# Patient Record
Sex: FEMALE | Race: BLACK OR AFRICAN AMERICAN | NOT HISPANIC OR LATINO | URBAN - METROPOLITAN AREA
[De-identification: names, ages, dates, MRNs, and addresses within clinical notes are randomized per-mention and may not be internally consistent; named-entity substitution may affect disease eponyms.]

---

## 2018-12-21 ENCOUNTER — INPATIENT (INPATIENT)
Facility: HOSPITAL | Age: 83
LOS: 4 days | Discharge: HOME | End: 2018-12-26
Attending: INTERNAL MEDICINE | Admitting: INTERNAL MEDICINE
Payer: MEDICARE

## 2018-12-21 VITALS
OXYGEN SATURATION: 96 % | TEMPERATURE: 97 F | DIASTOLIC BLOOD PRESSURE: 70 MMHG | HEIGHT: 66 IN | HEART RATE: 72 BPM | SYSTOLIC BLOOD PRESSURE: 130 MMHG | WEIGHT: 149.91 LBS | RESPIRATION RATE: 20 BRPM

## 2018-12-21 DIAGNOSIS — E11.9 TYPE 2 DIABETES MELLITUS WITHOUT COMPLICATIONS: ICD-10-CM

## 2018-12-21 DIAGNOSIS — I10 ESSENTIAL (PRIMARY) HYPERTENSION: ICD-10-CM

## 2018-12-21 DIAGNOSIS — I63.321 CEREBRAL INFARCTION DUE TO THROMBOSIS OF RIGHT ANTERIOR CEREBRAL ARTERY: ICD-10-CM

## 2018-12-21 LAB
ALBUMIN SERPL ELPH-MCNC: 3.6 G/DL — SIGNIFICANT CHANGE UP (ref 3.3–5)
ALP SERPL-CCNC: 74 U/L — SIGNIFICANT CHANGE UP (ref 40–120)
ALT FLD-CCNC: 23 U/L — SIGNIFICANT CHANGE UP (ref 12–78)
ANION GAP SERPL CALC-SCNC: 6 MMOL/L — SIGNIFICANT CHANGE UP (ref 5–17)
APTT BLD: 33.9 SEC — SIGNIFICANT CHANGE UP (ref 27.5–36.3)
AST SERPL-CCNC: 29 U/L — SIGNIFICANT CHANGE UP (ref 15–37)
BILIRUB SERPL-MCNC: 0.6 MG/DL — SIGNIFICANT CHANGE UP (ref 0.2–1.2)
BUN SERPL-MCNC: 20 MG/DL — SIGNIFICANT CHANGE UP (ref 7–23)
CALCIUM SERPL-MCNC: 9.3 MG/DL — SIGNIFICANT CHANGE UP (ref 8.5–10.1)
CHLORIDE SERPL-SCNC: 105 MMOL/L — SIGNIFICANT CHANGE UP (ref 96–108)
CO2 SERPL-SCNC: 30 MMOL/L — SIGNIFICANT CHANGE UP (ref 22–31)
CREAT SERPL-MCNC: 0.56 MG/DL — SIGNIFICANT CHANGE UP (ref 0.5–1.3)
GLUCOSE BLDC GLUCOMTR-MCNC: 163 MG/DL — HIGH (ref 70–99)
GLUCOSE SERPL-MCNC: 117 MG/DL — HIGH (ref 70–99)
HCT VFR BLD CALC: 52 % — HIGH (ref 34.5–45)
HGB BLD-MCNC: 16.4 G/DL — HIGH (ref 11.5–15.5)
INR BLD: 1.1 RATIO — SIGNIFICANT CHANGE UP (ref 0.88–1.16)
MCHC RBC-ENTMCNC: 28.6 PG — SIGNIFICANT CHANGE UP (ref 27–34)
MCHC RBC-ENTMCNC: 31.5 GM/DL — LOW (ref 32–36)
MCV RBC AUTO: 90.8 FL — SIGNIFICANT CHANGE UP (ref 80–100)
NRBC # BLD: 0 /100 WBCS — SIGNIFICANT CHANGE UP (ref 0–0)
PLATELET # BLD AUTO: 207 K/UL — SIGNIFICANT CHANGE UP (ref 150–400)
POTASSIUM SERPL-MCNC: 4.6 MMOL/L — SIGNIFICANT CHANGE UP (ref 3.5–5.3)
POTASSIUM SERPL-SCNC: 4.6 MMOL/L — SIGNIFICANT CHANGE UP (ref 3.5–5.3)
PROT SERPL-MCNC: 8 GM/DL — SIGNIFICANT CHANGE UP (ref 6–8.3)
PROTHROM AB SERPL-ACNC: 12.4 SEC — SIGNIFICANT CHANGE UP (ref 10–12.9)
RBC # BLD: 5.73 M/UL — HIGH (ref 3.8–5.2)
RBC # FLD: 13.5 % — SIGNIFICANT CHANGE UP (ref 10.3–14.5)
SODIUM SERPL-SCNC: 141 MMOL/L — SIGNIFICANT CHANGE UP (ref 135–145)
WBC # BLD: 7.51 K/UL — SIGNIFICANT CHANGE UP (ref 3.8–10.5)
WBC # FLD AUTO: 7.51 K/UL — SIGNIFICANT CHANGE UP (ref 3.8–10.5)

## 2018-12-21 PROCEDURE — 99291 CRITICAL CARE FIRST HOUR: CPT

## 2018-12-21 PROCEDURE — 93306 TTE W/DOPPLER COMPLETE: CPT | Mod: 26

## 2018-12-21 PROCEDURE — 99285 EMERGENCY DEPT VISIT HI MDM: CPT

## 2018-12-21 PROCEDURE — 71045 X-RAY EXAM CHEST 1 VIEW: CPT | Mod: 26

## 2018-12-21 PROCEDURE — 93010 ELECTROCARDIOGRAM REPORT: CPT

## 2018-12-21 PROCEDURE — 70450 CT HEAD/BRAIN W/O DYE: CPT | Mod: 26

## 2018-12-21 RX ORDER — INFLUENZA VIRUS VACCINE 15; 15; 15; 15 UG/.5ML; UG/.5ML; UG/.5ML; UG/.5ML
0.5 SUSPENSION INTRAMUSCULAR ONCE
Qty: 0 | Refills: 0 | Status: DISCONTINUED | OUTPATIENT
Start: 2018-12-21 | End: 2018-12-26

## 2018-12-21 RX ORDER — ALTEPLASE 100 MG
6 KIT INTRAVENOUS ONCE
Qty: 0 | Refills: 0 | Status: COMPLETED | OUTPATIENT
Start: 2018-12-21 | End: 2018-12-21

## 2018-12-21 RX ORDER — HYDRALAZINE HCL 50 MG
10 TABLET ORAL EVERY 6 HOURS
Qty: 0 | Refills: 0 | Status: DISCONTINUED | OUTPATIENT
Start: 2018-12-21 | End: 2018-12-23

## 2018-12-21 RX ORDER — ALTEPLASE 100 MG
56 KIT INTRAVENOUS ONCE
Qty: 0 | Refills: 0 | Status: COMPLETED | OUTPATIENT
Start: 2018-12-21 | End: 2018-12-21

## 2018-12-21 RX ADMIN — ALTEPLASE 56 MILLIGRAM(S): KIT at 14:43

## 2018-12-21 RX ADMIN — Medication 10 MILLIGRAM(S): at 20:26

## 2018-12-21 RX ADMIN — ALTEPLASE 360 MILLIGRAM(S): KIT at 14:46

## 2018-12-21 NOTE — H&P ADULT - NSHPLABSRESULTS_GEN_ALL_CORE
CBC Full  -  ( 21 Dec 2018 13:35 )  WBC Count : 7.51 K/uL  Hemoglobin : 16.4 g/dL  Hematocrit : 52.0 %  Platelet Count - Automated : 207 K/uL  Mean Cell Volume : 90.8 fl  Mean Cell Hemoglobin : 28.6 pg  Mean Cell Hemoglobin Concentration : 31.5 gm/dL  Auto Neutrophil # : x  Auto Lymphocyte # : x  Auto Monocyte # : x  Auto Eosinophil # : x  Auto Basophil # : x  Auto Neutrophil % : x  Auto Lymphocyte % : x  Auto Monocyte % : x  Auto Eosinophil % : x  Auto Basophil % : x      12-21    141  |  105  |  20  ----------------------------<  117<H>  4.6   |  30  |  0.56    Ca    9.3      21 Dec 2018 13:35  TPro  8.0  /  Alb  3.6  /  TBili  0.6  /  DBili  x   /  AST  29  /  ALT  23  /  AlkPhos  74  12-21    LIVER FUNCTIONS - ( 21 Dec 2018 13:35 )  Alb: 3.6 g/dL / Pro: 8.0 gm/dL / ALK PHOS: 74 U/L / ALT: 23 U/L / AST: 29 U/L / GGT: x           CAPILLARY BLOOD GLUCOSE  163 (21 Dec 2018 12:56)    POCT Blood Glucose.: 163 mg/dL (21 Dec 2018 12:43)    PT/INR - ( 21 Dec 2018 13:35 )   PT: 12.4 sec;   INR: 1.10 ratio    PTT - ( 21 Dec 2018 13:35 )  PTT:33.9 sec  < from: CT Brain Stroke Protocol (12.21.18 @ 13:09) >    IMPRESSION:    1)  chronic ischemic changes noted in both hemispheres with mild to   moderate volume loss. Fairly extensive atherosclerotic changes noted of   the intracerebral vasculature.  2)  no obvious acute territorial infarct or hemorrhage. Follow-up MR   imaging recommended.     < end of copied text >    EKG:  EKG performed in ED, sinus savannah at 56, No acute ischemic changes, normal intervals

## 2018-12-21 NOTE — STROKE CODE NOTE - SUBJECTIVE
89-year-old woman was reported to be completely normal at her baseline until 11:25 AM when she was seen by her daughter-in-law. She went to use bathroom subsequently. At 11:30 AM, she was found on the toilet bowl with left facial droop and dysarthria. She was brought to [facility] for further evaluation. She is reported to have some improvement in her focal neurological deficits, but still continued to have left facial droop, slurring of speech and mild left leg weakness.

## 2018-12-21 NOTE — STROKE CODE NOTE - ASSESSMENT/PLAN
89 years old woman with multiple vascular risk factors including age, HTN, DM and HPLD is evaluated to assess for acute onset of left facial droop. He did she was reported to be normal at around 11:25 AM when her daughter-in-law saw her walking to the bathroom without any deficits. At 11:30 AM, she was noted to have left facial droop by her daughter-in-law. Examination through telestroke reveals left facial droop, left leg mild drift, mild dysarthria and disorientation to her age, and month. CT brain on admission did not show any evidence of acute infarct or hemorrhage.    Impression:  Cerebral thrombosis with cerebral infarction. Right DOMITILA distribution infarct involving subcortical structure like corona radiata - likely etiology being small vessel disease but possibility of large vessel disease and embolism from a proximal source needs to be ruled out

## 2018-12-21 NOTE — ED PROVIDER NOTE - PHYSICAL EXAMINATION
Vitals: WNL  Gen: AAOx3, NAD, sitting comfortably in stretcher  Head: ncat, perrla, eomi b/l  Neck: supple, no lymphadenopathy, no midline deviation  Heart: rrr, no m/r/g  Lungs: CTA b/l, no rales/ronchi/wheezes  Abd: soft, nontender, non-distended, no rebound or guarding  Ext: no clubbing/cyanosis/edema  Neuro: sensation and muscle strength intact b/l in extremities, L lower facial droop subtle, CN2-12 otherwise intact, no other focal weakness

## 2018-12-21 NOTE — H&P ADULT - NSHPPHYSICALEXAM_GEN_ALL_CORE
GENERAL: NAD, well-groomed, well-developed, speech dysarthria   HEAD:  Atraumatic, Normocephalic  EYES: EOMI, PERRLA, conjunctiva and sclera clear  ENMT: No tonsillar erythema, exudates, or enlargement; Moist mucous membranes, Left facial droop (flattened nasolabial fold, asymmetry on smiling)  NECK: Supple, No JVD, Normal thyroid  NERVOUS SYSTEM:  Alert & Oriented x2, Motor Strength 4/5 B/L upper and left 4/5 lower 5/5 right ext ; DTRs 2+ intact and symmetric + sensation  CHEST/LUNG: Clear to percussion bilaterally; No rales, rhonchi, wheezing, or rubs  HEART: Regular rate and rhythm; No murmurs,   ABDOMEN: Soft, Nontender, Nondistended; Bowel sounds present  EXTREMITIES:  2+ Peripheral Pulses, no cyanosis, or edema  LYMPH: No lymphadenopathy noted  SKIN: No rashes or lesions

## 2018-12-21 NOTE — H&P ADULT - NSHPREVIEWOFSYSTEMS_GEN_ALL_CORE
CONSTITUTIONAL: No fever, weight loss, or fatigue  EYES: No eye pain, visual disturbances, or discharge  ENMT:  No difficulty hearing, tinnitus, vertigo; No sinus or throat pain  NECK: No pain or stiffness  RESPIRATORY: No cough, wheezing, chills or hemoptysis; No shortness of breath  CARDIOVASCULAR: No chest pain, palpitations, dizziness, or leg swelling  GASTROINTESTINAL: No abdominal or epigastric pain. No nausea, vomiting,.  GENITOURINARY: No dysuria, frequency, hematuria, or incontinence  NEUROLOGICAL: left sided weakness slurred speech + left facial drop  SKIN: No itching, burning, rashes, or lesions   MUSCULOSKELETAL: left side weakness   PSYCHIATRIC: No depression, anxiety,

## 2018-12-21 NOTE — ED PROVIDER NOTE - PROGRESS NOTE DETAILS
case discussed with telestroke, recommend TPA for acute stroke, will give now and monitor  [facility], [facility]

## 2018-12-21 NOTE — H&P ADULT - PROBLEM SELECTOR PLAN 2
as above  -insulin sliding scale and fingersticks,   -check A1C,   -diabetic teaching,   -ADA diet after speech and swallow evaluation

## 2018-12-21 NOTE — H&P ADULT - PROBLEM SELECTOR PLAN 3
as above  - will  order hydralazine iv prn for now   -as patient with bradycardia on monitor would not use BB at this time   -maintain sbp around 150    -check lipid panel in am

## 2018-12-21 NOTE — H&P ADULT - HISTORY OF PRESENT ILLNESS
88 y/o F PMH: NIDDM, HTN HLP; presented  with L sided facial droop, noticed about 11:45 AM today, pt. was fine at 11:30 AM today.  No other complaints from patient or family.  They thought she was having a stoke so they wanted to get her checked.  Pt. is acting like herself as per family. Code stroke called ct head done and ER MD discussed case with telestroke, recommend TPA for acute stroke and ICU called for further management. 88 y/o F PMH: NIDDM, HTN HLP; presented  with L sided facial droop, noticed about 11:45 AM today, pt. was fine at 11:30 AM today.  No other complaints from patient or family.  They thought she was having a stoke so they wanted to get her checked.  Pt. is acting like herself as per family.  Code stroke called ct head done and ER MD discussed case with telestroke, recommend TPA for acute stroke and ICU called for further management. As per patient son patient not on any antihypertensive and diabetic medication about 2 years. Patient seen and examined in ER with ICU attending.

## 2018-12-21 NOTE — STROKE CODE NOTE - PROBLEM SELECTOR PLAN 1
Plan:   - Risks, benefits and adverse reactions associated with IV tPA including hemorrhagic stroke were discussed with patient and available family member (son at bedside) in detail. After ruling out contraindications and obtaining verbal consent, patient would be treated with IV tPA in the ED  - Cont with IV tPA precautions including BP goal<185/110 mmHg before the bolus and <180/105 mmHg for first 24 hours after bolus followed by gradual normotension  - Not a candidate for neurointervention due to low NIHSS   - Aspirin and pharmacological DVT prophylaxis to be considered 24 hours after the IV tPA and repeat brain imaging. SCDs for DVT prophylaxis in the interim   - Atorvastatin 80 mg after establishing enteral access or passing swallow evaluation  - TTE with bubble study and telemetry to screen for possible cardiac source of embolism  - LDL and HbA1C, continue with aggressive vascular risk factors modifications   - PT/OT/Speech and swallow evaluation     Above mentioned plan was discussed with patient, available family member (son) and [facility] ED MD in detail. All the questions were answered and concerns were addressed.

## 2018-12-21 NOTE — ED ADULT TRIAGE NOTE - CHIEF COMPLAINT QUOTE
son states, " My daughter was talking to her and she started slurring her speech , she had some facial droop its getting better " last well known [id number]

## 2018-12-21 NOTE — H&P ADULT - PROBLEM SELECTOR PLAN 1
Admit to MICU as d/w intensivist,   -neurochecks q1 hour per protocol,   -follow up CT after 24 hours or if neurological deterioration STAT CT,  - MRI/MRA per neurology,   -check lipid panel - start statin, and ASA if repeat CT without bleed  - check A1C, PT, OT,   -Speech and swallow evaluation,   -start dvt prophylaxis after 24 hours if no bleed, intermittent stockings at this time   -start aspirin if no bleed,   -2decho r/o thrombus, carotid doppler r/o stenosis,   -Neurology consult called Dr. Gamez to see patient

## 2018-12-21 NOTE — ED PROVIDER NOTE - OBJECTIVE STATEMENT
89 yo F with L sided facial droop, noticed about 11:45 AM today, pt. was fine at 11:30 AM today.  No other complaints from patient or family.  They thought she was having a stoke so they wanted to get her checked.  Pt. is acting like herself as per family.    ROS: negative for fever, cough, headache, chest pain, shortness of breath, abd pain, nausea, vomiting, diarrhea, rash, paresthesia, and weakness--all other systems reviewed are negative.   PMH: NIDDM, HLD; Meds: See EMR; SH: Denies smoking/drinking/drug use

## 2018-12-21 NOTE — ED ADULT NURSE NOTE - OBJECTIVE STATEMENT
as per pt.'s son, pt was found with slurred speech and left sided facial droop at 1230. No respiratory or cardiac distress.

## 2018-12-21 NOTE — H&P ADULT - PMH
DM (diabetes mellitus)    HTN (hypertension) DM (diabetes mellitus)    HTN (hypertension)    Hyperlipidemia

## 2018-12-21 NOTE — ED ADULT NURSE NOTE - NSIMPLEMENTINTERV_GEN_ALL_ED
Implemented All Fall with Harm Risk Interventions:  [place] to call system. Call bell, personal items and telephone within reach. Instruct patient to call for assistance. Room bathroom lighting operational. Non-slip footwear when patient is off stretcher. Physically safe environment: no spills, clutter or unnecessary equipment. Stretcher in lowest position, wheels locked, appropriate side rails in place. Provide visual cue, wrist band, yellow gown, etc. Monitor gait and stability. Monitor for mental status changes and reorient to person, place, and time. Review medications for side effects contributing to fall risk. Reinforce activity limits and safety measures with patient and family. Provide visual clues: red socks.

## 2018-12-21 NOTE — ED ADULT NURSE NOTE - CHIEF COMPLAINT QUOTE
son states, " My daughter was talking to her and she started slurring her speech , she had some facial droop its getting better " last well known [id number]

## 2018-12-22 LAB
ANION GAP SERPL CALC-SCNC: 8 MMOL/L — SIGNIFICANT CHANGE UP (ref 5–17)
BUN SERPL-MCNC: 13 MG/DL — SIGNIFICANT CHANGE UP (ref 7–23)
CALCIUM SERPL-MCNC: 9.2 MG/DL — SIGNIFICANT CHANGE UP (ref 8.5–10.1)
CHLORIDE SERPL-SCNC: 104 MMOL/L — SIGNIFICANT CHANGE UP (ref 96–108)
CHOLEST SERPL-MCNC: 261 MG/DL — HIGH (ref 10–199)
CO2 SERPL-SCNC: 27 MMOL/L — SIGNIFICANT CHANGE UP (ref 22–31)
CREAT SERPL-MCNC: 0.51 MG/DL — SIGNIFICANT CHANGE UP (ref 0.5–1.3)
GLUCOSE BLDC GLUCOMTR-MCNC: 102 MG/DL — HIGH (ref 70–99)
GLUCOSE BLDC GLUCOMTR-MCNC: 120 MG/DL — HIGH (ref 70–99)
GLUCOSE BLDC GLUCOMTR-MCNC: 140 MG/DL — HIGH (ref 70–99)
GLUCOSE SERPL-MCNC: 110 MG/DL — HIGH (ref 70–99)
HBA1C BLD-MCNC: 6.6 % — HIGH (ref 4–5.6)
HCT VFR BLD CALC: 48.9 % — HIGH (ref 34.5–45)
HDLC SERPL-MCNC: 75 MG/DL — SIGNIFICANT CHANGE UP
HGB BLD-MCNC: 16 G/DL — HIGH (ref 11.5–15.5)
LIPID PNL WITH DIRECT LDL SERPL: 172 MG/DL — HIGH
MAGNESIUM SERPL-MCNC: 2.3 MG/DL — SIGNIFICANT CHANGE UP (ref 1.6–2.6)
MCHC RBC-ENTMCNC: 29.2 PG — SIGNIFICANT CHANGE UP (ref 27–34)
MCHC RBC-ENTMCNC: 32.7 GM/DL — SIGNIFICANT CHANGE UP (ref 32–36)
MCV RBC AUTO: 89.2 FL — SIGNIFICANT CHANGE UP (ref 80–100)
NRBC # BLD: 0 /100 WBCS — SIGNIFICANT CHANGE UP (ref 0–0)
PHOSPHATE SERPL-MCNC: 3.1 MG/DL — SIGNIFICANT CHANGE UP (ref 2.5–4.5)
PLATELET # BLD AUTO: 198 K/UL — SIGNIFICANT CHANGE UP (ref 150–400)
POTASSIUM SERPL-MCNC: 4 MMOL/L — SIGNIFICANT CHANGE UP (ref 3.5–5.3)
POTASSIUM SERPL-SCNC: 4 MMOL/L — SIGNIFICANT CHANGE UP (ref 3.5–5.3)
RBC # BLD: 5.48 M/UL — HIGH (ref 3.8–5.2)
RBC # FLD: 13.5 % — SIGNIFICANT CHANGE UP (ref 10.3–14.5)
SODIUM SERPL-SCNC: 139 MMOL/L — SIGNIFICANT CHANGE UP (ref 135–145)
TOTAL CHOLESTEROL/HDL RATIO MEASUREMENT: 3.5 RATIO — SIGNIFICANT CHANGE UP (ref 3.3–7.1)
TRIGL SERPL-MCNC: 70 MG/DL — SIGNIFICANT CHANGE UP (ref 10–149)
WBC # BLD: 8.53 K/UL — SIGNIFICANT CHANGE UP (ref 3.8–10.5)
WBC # FLD AUTO: 8.53 K/UL — SIGNIFICANT CHANGE UP (ref 3.8–10.5)

## 2018-12-22 PROCEDURE — 70450 CT HEAD/BRAIN W/O DYE: CPT | Mod: 26

## 2018-12-22 PROCEDURE — 99291 CRITICAL CARE FIRST HOUR: CPT

## 2018-12-22 PROCEDURE — 93880 EXTRACRANIAL BILAT STUDY: CPT | Mod: 26

## 2018-12-22 RX ORDER — GLUCAGON INJECTION, SOLUTION 0.5 MG/.1ML
1 INJECTION, SOLUTION SUBCUTANEOUS ONCE
Qty: 0 | Refills: 0 | Status: DISCONTINUED | OUTPATIENT
Start: 2018-12-22 | End: 2018-12-26

## 2018-12-22 RX ORDER — LOSARTAN POTASSIUM 100 MG/1
50 TABLET, FILM COATED ORAL DAILY
Qty: 0 | Refills: 0 | Status: DISCONTINUED | OUTPATIENT
Start: 2018-12-22 | End: 2018-12-23

## 2018-12-22 RX ORDER — INSULIN LISPRO 100/ML
VIAL (ML) SUBCUTANEOUS
Qty: 0 | Refills: 0 | Status: DISCONTINUED | OUTPATIENT
Start: 2018-12-22 | End: 2018-12-26

## 2018-12-22 RX ORDER — SODIUM CHLORIDE 9 MG/ML
1000 INJECTION, SOLUTION INTRAVENOUS
Qty: 0 | Refills: 0 | Status: DISCONTINUED | OUTPATIENT
Start: 2018-12-22 | End: 2018-12-26

## 2018-12-22 RX ORDER — DEXTROSE 50 % IN WATER 50 %
12.5 SYRINGE (ML) INTRAVENOUS ONCE
Qty: 0 | Refills: 0 | Status: DISCONTINUED | OUTPATIENT
Start: 2018-12-22 | End: 2018-12-26

## 2018-12-22 RX ORDER — DEXTROSE 50 % IN WATER 50 %
15 SYRINGE (ML) INTRAVENOUS ONCE
Qty: 0 | Refills: 0 | Status: DISCONTINUED | OUTPATIENT
Start: 2018-12-22 | End: 2018-12-26

## 2018-12-22 RX ORDER — RISPERIDONE 4 MG/1
0.5 TABLET ORAL ONCE
Qty: 0 | Refills: 0 | Status: COMPLETED | OUTPATIENT
Start: 2018-12-22 | End: 2018-12-22

## 2018-12-22 RX ORDER — DOCUSATE SODIUM 100 MG
100 CAPSULE ORAL
Qty: 0 | Refills: 0 | Status: DISCONTINUED | OUTPATIENT
Start: 2018-12-22 | End: 2018-12-26

## 2018-12-22 RX ORDER — DEXTROSE 50 % IN WATER 50 %
25 SYRINGE (ML) INTRAVENOUS ONCE
Qty: 0 | Refills: 0 | Status: DISCONTINUED | OUTPATIENT
Start: 2018-12-22 | End: 2018-12-26

## 2018-12-22 RX ORDER — SIMVASTATIN 20 MG/1
40 TABLET, FILM COATED ORAL AT BEDTIME
Qty: 0 | Refills: 0 | Status: DISCONTINUED | OUTPATIENT
Start: 2018-12-22 | End: 2018-12-23

## 2018-12-22 RX ADMIN — LOSARTAN POTASSIUM 50 MILLIGRAM(S): 100 TABLET, FILM COATED ORAL at 18:03

## 2018-12-22 RX ADMIN — Medication 10 MILLIGRAM(S): at 17:22

## 2018-12-22 RX ADMIN — RISPERIDONE 0.5 MILLIGRAM(S): 4 TABLET ORAL at 23:04

## 2018-12-22 RX ADMIN — Medication 100 MILLIGRAM(S): at 21:48

## 2018-12-22 RX ADMIN — SIMVASTATIN 40 MILLIGRAM(S): 20 TABLET, FILM COATED ORAL at 21:48

## 2018-12-22 NOTE — CONSULT NOTE ADULT - SUBJECTIVE AND OBJECTIVE BOX
Subjective Complaints:  Historian:       Consult requested by ER doctor:                  Attending:     HPI:  88 y/o F PMH: NIDDM, HTN HLP; presented  with L sided facial droop, noticed about 11:45 AM today, pt. was fine at 11:30 AM today.  No other complaints from patient or family.  They thought she was having a stoke so they wanted to get her checked.  Pt. is acting like herself as per family.  Code stroke called ct head done and ER MD discussed case with telestroke, recommend TPA for acute stroke and ICU called for further management. As per patient son patient not on any antihypertensive and diabetic medication about 2 years. Patient seen and examined in ER with ICU attending. (21 Dec 2018 16:11)    TANYA OROURKE    PAST MEDICAL & SURGICAL HISTORY:  Hyperlipidemia  DM (diabetes mellitus)  HTN (hypertension)  No significant past surgical history  89yFemale    MEDICATIONS  (STANDING):  dextrose 5%. 1000 milliLiter(s) (50 mL/Hr) IV Continuous <Continuous>  dextrose 50% Injectable 12.5 Gram(s) IV Push once  dextrose 50% Injectable 25 Gram(s) IV Push once  dextrose 50% Injectable 25 Gram(s) IV Push once  influenza   Vaccine 0.5 milliLiter(s) IntraMuscular once  insulin lispro (HumaLOG) corrective regimen sliding scale   SubCutaneous Before meals and at bedtime    MEDICATIONS  (PRN):  dextrose 40% Gel 15 Gram(s) Oral once PRN Blood Glucose LESS THAN 70 milliGRAM(s)/deciliter  glucagon  Injectable 1 milliGRAM(s) IntraMuscular once PRN Glucose LESS THAN 70 milligrams/deciliter  hydrALAZINE Injectable 10 milliGRAM(s) IV Push every 6 hours PRN sbp > 170      Allergies    No Known Allergies    Intolerances      FAMILY HISTORY:  No pertinent family history in first degree relatives      REVIEW OF SYSTEMS:  General:  No wt loss, fevers, chills, night sweats  Eyes:  Good vision, no reported pain  ENT:  No sore throat, pain, runny nose, dysphagia  CV:  No pain, palpitatioins, hypo/hypertension  Resp:  No dyspnea, cough, tachypnea, wheezing  GI:  No pain, nausea, vomiting, diarrhea, constipatiion  :  No pain, bleeding, incontinence, nocturia  Muscle:  No pain, weakness  Breast:  No pain, abscess, mass, discharge  Neuro:  No weakness, tingling, memory problems  Psych:  No fatigue, insomnia, mood problems, depression  Endocrine:  No polyuria, polydypsia, cold/heat intolerance  Heme:  No petechiae, ecchymosis, easy bruisability  Skin:  No rash, tattoos, scars, edema      Vital Signs Last 24 Hrs  T(C): 37.1 (22 Dec 2018 14:02), Max: 37.3 (21 Dec 2018 16:22)  T(F): 98.7 (22 Dec 2018 14:02), Max: 99.2 (21 Dec 2018 16:22)  HR: 53 (22 Dec 2018 15:23) (45 - 70)  BP: 122/56 (22 Dec 2018 15:23) (87/56 - 188/73)  BP(mean): 72 (22 Dec 2018 15:23) (62 - 132)  RR: 23 (22 Dec 2018 15:23) (13 - 35)  SpO2: 94% (22 Dec 2018 15:23) (87% - 100%)    GENERAL PHYSICAL EXAM:  General:  Appears stated age, well-groomed, well-nourished, no distress  HEENT:  NC/AT, patent nares w/ pink mucosa, OP clear w/o lesions, PERRL, EOMI, conjunctivae clear, no thyromegaly, nodules, adenopathy, no JVD  Chest:  Full & symmetric excursion, no increased effort, breath sounds clear  Cardiovascular:  Regular rhythm, S1, S2, no murmur/rub/S3/S4, no carotid/femoral/abdominal bruit, radial/pedal pulses 2+, no edema  Abdomen:  Soft, non-tender, non-distended, normoactive bowel sounds, no HSM  Extremities:  Gait & station:   Digits:   Nails:   Joints, Bones, Muscles:   ROM:   Stability:  Skin:  No rash/erythema/ecchymoses/petechiae/wounds/abscess/warm/dry  Musculoskeletal:  Full ROM in all joints w/o swelling/tenderness/effusion    NEUROLOGICAL EXAM:  HENT:  Normocephalic head; atraumatic head.  Neck supple.  ENT: normal looking.  Mental State:    Alert.  Fully oriented to person, place and date.  Coherent.  Speech clear and intact.  Cooperative.  Responds appropriately.    Cranial Nerves:  II-XII:   Pupils round and reactive to light and accommodation.  Extraocular movements full.  Visual fields full (no homonymous hemianopsia).  Visual acuity wnl.left facial weakness right facial is ok   Facial symmetry intact.  Tongue midline.  Motor Functions:  Moves all extremities.  No pronator drift of UE.motor strength is 5/5     Sensory Functions:   Intact to touch and pinprick to face and extremities.    Reflexes:  Deep tendon reflexes normoactive to biceps, knees and ankles.  Babinski absent (present).  Cerebellar Testing:    Finger to nose intact.  Nystagmus absent.  gait : unremarkable      LABS:                        16.0   8.53  )-----------( 198      ( 22 Dec 2018 04:03 )             48.9     12-22    139  |  104  |  13  ----------------------------<  110<H>  4.0   |  27  |  0.51    Ca    9.2      22 Dec 2018 04:03  Phos  3.1     12-22  Mg     2.3     12-22    TPro  8.0  /  Alb  3.6  /  TBili  0.6  /  DBili  x   /  AST  29  /  ALT  23  /  AlkPhos  74  12-21    PT/INR - ( 21 Dec 2018 13:35 )   PT: 12.4 sec;   INR: 1.10 ratio         PTT - ( 21 Dec 2018 13:35 )  PTT:33.9 sec        RADIOLOGY & ADDITIONAL STUDIES:    Admit to Inpatient Level of Care:     Service:  ICU    Physician:  Dr. Prado (12-21 @ 16:01)  Admit to Inpatient Level of Care:     Service:  ICU    Physician:  Johan (12-21 @ 16:01)  Provider to RN:       Sedation awakening trial as per ABCDEF Guidelines (12-21 @ 16:01)  Assess For:     Additional Instructions:  CAM ICU Assessment.  As per ICU policy or at a minimum every shift (12-21 @ 16:01)  Height/Length:     Frequency:  on admission (12-21 @ 16:01)  Weight:     Frequency:  daily (12-21 @ 16:01)  Vital Signs:     Frequency:  every 1 hour    Additional Instructions:  Assess BP, HR, RR, Temp. and SpO2 (12-21 @ 16:01)  Assess Pain:     Frequency:  every 4 hours    Additional Instructions:  As per ABCDEF guideline (12-21 @ 16:01)  Assess Neurological Status:     Type of Neuro Check:  General    Frequency:  every 4 hours (12-21 @ 16:01)  Intake and Output - Strict:     Frequency:  every 1 hour (12-21 @ 16:01)  Notify Provider For:     Additional Instructions:  Decline or change in neurological status (12-21 @ 16:01)  Activity - Increase As Tolerated:     Time/Priority:  Routine (12-21 @ 16:01)  VTE Prophylaxis - No Pharmacological Prophylaxis Due To::     Time/Priority:  Routine    Reason For No Pharmacologic VTE Prophylaxis  Bleeding Risk    Additional Instructions:  sp tpa (12-21 @ 16:01)  Intermittent Pneumatic Compression:     Body Side:  Bilateral (12-21 @ 16:01)  hydrALAZINE Injectable: [Known as APRESOLINE Injectable]  10 milliGRAM(s), IV Push, every 6 hours, PRN for sbp > 170  Administration Instructions: This is a Look-alike/Sound-alike Medication  Provider's Contact #: [phone] (12-21 @ 16:01)  Lipid Profile: AM Sched. Collection: 22-Dec-2018 05:00 (12-21 @ 16:01)  Hemoglobin A1C, Whole Blood: AM Sched. Collection: 22-Dec-2018 05:00 (12-21 @ 16:01)  Assess Neurological Status:     Type of Neuro Check:  Stroke    Frequency:  every 1 hour (12-21 @ 16:01)  Activity - Bedrest (12-21 @ 16:01)  Basic Metabolic Panel: AM Sched. Collection: 22-Dec-2018 05:00 (12-21 @ 16:01)  Complete Blood Count: AM Sched. Collection: 22-Dec-2018 05:00 (12-21 @ 16:01)  Magnesium, Serum: AM Sched. Collection: 22-Dec-2018 05:00 (12-21 @ 16:01)  Phosphorus Level, Serum: AM Sched. Collection: 22-Dec-2018 05:00 (12-21 @ 16:01)  US Duplex Carotid Arteries Complete, Bilateral: Routine   Indication: cva  Transport: Portable  Exam Completed  Provider's Contact #: [phone] (12-21 @ 16:08)  TTE Echo Doppler w/o Cont:   Transport: Portable  Monitor: w/ Monitor  Exam Completed  Provider's Contact #: [phone] (12-21 @ 16:08)  influenza   Vaccine:   0.5 milliLiter(s), IntraMuscular, once  Indication: Immunization  Administration Instructions: Shake well and refrigerate.  If vaccine is to be given at time of discharge, please allow a miniumum of 30 minutes for patient observation.  If to be given concomitantly with Pneumococcal Vaccine, please use a different arm for each vaccination.  This is (12-21 @ 17:18)  Chart Check (12-21 @ 19:23)  Diet, Dysphagia 2 Mechanical Soft-Thin Liquids:   Consistent Carbohydrate Evening Snack  DASH/TLC Sodium & Cholesterol Restricted (12-22 @ 09:49)  Basic Metabolic Panel: AM Sched. Collection: 23-Dec-2018 04:00 (12-22 @ 10:37)  Complete Blood Count: AM Sched. Collection: 23-Dec-2018 04:00 (12-22 @ 10:37)  Magnesium, Serum: AM Sched. Collection: 23-Dec-2018 04:00 (12-22 @ 10:37)  Phosphorus Level, Serum: AM Sched. Collection: 23-Dec-2018 04:00 (12-22 @ 10:37)  Consult- PT Evaluate and Treat:   *Reason for Consult - Must select at least one choice*     Other: cva  Weight Bearing Restrictions: No (12-22 @ 10:50)  Consult- OT Evaluate and Treat:   *Reason for Consult - Must select at least one choice*     Other: cva  Weight Bearing Restrictions: No (12-22 @ 10:50)  Blood Glucose Point Of Care Testing:     Frequency:  Before meals and at bedtime    Additional Instructions:  Before meals and at bedtime (12-22 @ 10:59)  Blood Glucose Point Of Care Testing:     Frequency:  every 15 minutes    Additional Instructions:  After carbohydrate administration for hypoglycemia, repeat every 15 minute(s) until blood glucose is GREATER THAN or EQUAL  milliGRAM(s)/deciLiter twice consecutively (12-22 @ 10:59)  Notify Provider For:     Additional Instructions:  Blood glucose LESS THAN 70 milliGRAM(s)/deciLiter or GREATER THAN 400 milliGRAM(s)/deciLiter (12-22 @ 10:59)  Education:     Diabetes    Other: Diet, Exercise    Additional Instructions: Diet, Exercise, Diabetes (12-22 @ 10:59)  insulin lispro (HumaLOG) corrective regimen sliding scale:       2 Unit(s) if Glucose 151 - 200      4 Unit(s) if Glucose 201 - 250      6 Unit(s) if Glucose 251 - 300      8 Unit(s) if Glucose 301 - 350      10 Unit(s) if Glucose 351 - 400      12 Unit(s) if Glucose Greater Than 400 + Contact MD  SubCutaneous, Before meals and at bedtime  Special Instructions: Give correctional scale insulin REGARDLESS of PO status NOTIFY Provider for blood glucose LESS THAN 70 milliGRAM(s)/deciLiter or above 400 milliGRAM(s)/deciLiter.  Administration Instructions: *Per Sliding Scale*  Dispose unused medication in BLACK bin.  This is a Look-alike/Sound-alike Medication  Provider's Contact #: [phone] (12-22 @ 10:59)  dextrose 5%.: Solution, 1000 milliLiter(s) infuse at 50 mL/Hr  Special Instructions: Conditional Order: HYPOGLYCEMIA PROTOCOL  Provider's Contact #: [phone] (12-22 @ 10:59)  Administer Carbohydrates:     Additional Instructions:  STAT RESPONSIVE patient and Blood Glucose is LESS THAN 70 milliGRAM(s)/deciLiter: Administer 15 grams of fast acting carbohydrate [e.g., Give 4 ounces of APPLE Juice OR 6 ounces of NON-DIET soda OR 1 tube (15 grams) oral glucose gel (available in Automated Dispensing Machine)] and recheck capillary blood glucose in 15 minutes.  Repeat treatment and recheck glucose every 15 minutes until Blood Glucose is GREATER THAN or EQUAL  milliGRAM(s)/deciLiter and NOTIFY Provider.  HYPOGLYCEMIA PROTOCOL (12-22 @ 10:59)  dextrose 40% Gel: [Known as GLUTOSE 15]  15 Gram(s), Oral, once, PRN for Blood Glucose LESS THAN 70 milliGRAM(s)/deciliter, Stop After 1 Doses  Special Instructions: Conditional Order: HYPOGLYCEMIA PROTOCOL  Administration Instructions: Contents of 37.5 Gram(s) tube delivers 15 Gram(s) dextrose  Provider's Contact #: [phone] (12-22 @ 10:59)  dextrose 50% Injectable:   12.5 Gram(s), IV Push, once, Stop After 1 Doses  Special Instructions: Conditional Order: HYPOGLYCEMIA PROTOCOL  Provider's Contact #: [phone] (12-22 @ 10:59)  dextrose 50% Injectable:   25 Gram(s), IV Push, once, Stop After 1 Doses  Special Instructions: Conditional Order: HYPOGLYCEMIA PROTOCOL  Provider's Contact #: [phone] (12-22 @ 10:59)  dextrose 50% Injectable:   25 Gram(s), IV Push, once, Stop After 1 Doses  Special Instructions: Conditional Order: HYPOGLYCEMIA PROTOCOL  Provider's Contact #: [phone] (12-22 @ 10:59)  glucagon  Injectable:   1 milliGRAM(s), IntraMuscular, once, PRN for Glucose LESS THAN 70 milligrams/deciliter, Stop After 1 Doses  Special Instructions: Conditional Order: HYPOGLYCEMIA PROTOCOL  Provider's Contact #: [phone] (12-22 @ 10:59)  Provider to RN:       UNRESPONSIVE patient and Blood Glucose LESS THAN 70 milliGRAM(s)/deciLiter call Rapid Response.  HYPOGLYCEMIA PROTOCOL (12-22 @ 10:59)  POCT  Blood Glucose: 11:29 (12-22 @ 11:30)  CT Head No Cont: Routine   Indication: follow up cva s/p tpa  Transport: Stretcher-Crib,  w/ Monitor  Provider's Contact #: [phone] (12-22 @ 13:06)      Assessment & Opinion: 82 y o woman with h/o HTN,DM found to have a left facial weakness received tpa for a stroke and is almost back to her base line ,NIHSS IS 1     Recommendations: REPEAT HEAD CT   Carotid doppler.  Echocardiogram.     DVT prophylaxis as ordered.  Medications: STATIN ,ASA

## 2018-12-22 NOTE — CHART NOTE - NSCHARTNOTEFT_GEN_A_CORE
Called by Radiologist, reports repeat Head CT showed subcentimeter brain bleed to right frontal lobe.  Called and notified Neurology, will hold off starting ASA and Plavix and keep patient in ICU for neuro-checks

## 2018-12-22 NOTE — CONSULT NOTE ADULT - SUBJECTIVE AND OBJECTIVE BOX
History of Present Illness:  Reason for Admission: 89y Female complaining of slurred speech  History of Present Illness:   88 y/o F PMH: NIDDM, HTN HLP; presented  with L sided facial droop, noticed about 11:45 AM today, pt. was fine at 11:30 AM today.  No other complaints from patient or family.  P t evaluated  in  ER   Code stroke called ct head done and ER MD discussed case with telestroke, recommend TPA for acute stroke and ICU called for further management. Pt  reported  not on any antihypertensive and diabetic medication about 2 years.  presently  comfortable  confused  attempting  to  get  ou of  bed  able  to  be redirected  PHYSICAL EXAM:    GENERAL: NAD, well-groomed, well-developed  HEAD:  Atraumatic, Normocephalic  EYES: EOMI, PERRLA, conjunctiva and sclera clear  ENMT: No tonsillar erythema, exudates, or enlargement; Moist mucous membranes, , No lesions  NECK: Supple, No JVD, Normal thyroid  NERVOUS SYSTEM:  Alert & Oriented  x2, ; Motor Strength 5/5 B/L upper and lower extremities; DTRs 2+ intact and symmetric  CHEST/LUNG: Clear  bilaterally; No rales, rhonchi, wheezing, or rubs  HEART: Regular rate and rhythm; No murmurs, rubs, or gallops  ABDOMEN: Soft, Nontender, Nondistended; no  masses Bowel sounds present  EXTREMITIES:  + Peripheral Pulses, No clubbing, cyanosis, or edema  LYMPH: No lymphadenopathy noted   RECTAL: deferred    SKIN: No rashes or lesions  Problem/Plan - 1:  ·  Problem: Cerebrovascular accident (CVA) due to thrombosis of right anterior cerebral artery.    -neurochecks as  per  ICU  protocol  -follow up CT after 24 hours or if neurological deterioration STAT CT,  - MRI/MRA per neurology,   -check lipid panel - start statin, and ASA if repeat CT without bleed  - check A1C, PT, OT,   -Speech and swallow evaluation,   - dvt prophylaxis if  no  bleed  -start aspirin if no bleed,   -2decho r/o thrombus, carotid doppler r/o stenosis,   -Neurology consult called Dr. Gamez to see patient.      Problem/Plan - 2:  ·  Problem: DM (diabetes mellitus).  Plan: as above  -insulin sliding scale and fingersticks,   -check A1C,   -diabetic teaching,   -ADA diet after speech and swallow evaluation.      Problem/Plan - 3:  ·  Problem: HTN (hypertension).  Plan: as above  - will  order hydralazine iv prn for now   -as patient with bradycardia on monitor would not use BB at this time   -maintain sbp around 150    -check lipid panel   will  follow

## 2018-12-22 NOTE — PROGRESS NOTE ADULT - ASSESSMENT
89 years old woman with PMH: HTN, DM and HPLD, admitted with CVA s/p TPA in ER.    -Pt doing well, symptoms resolved   -follow up CT today  - MRI/MRA   - fup lipid panel   - start statin, and ASA if repeat CT without bleed   -start dvt prophylaxis after 24 hours if no bleed, intermittent stockings at this time   -2decho r/o thrombus, carotid doppler r/o stenosis,   -Neurology fup  -hydralazine iv prn for high BP  -insulin sliding scale and fingersticks,   -check A1C,   -diabetic teaching,   -ADA diet after speech and swallow evaluation.     CC time: 35 min

## 2018-12-23 LAB
ANION GAP SERPL CALC-SCNC: 10 MMOL/L — SIGNIFICANT CHANGE UP (ref 5–17)
BUN SERPL-MCNC: 16 MG/DL — SIGNIFICANT CHANGE UP (ref 7–23)
CALCIUM SERPL-MCNC: 9.2 MG/DL — SIGNIFICANT CHANGE UP (ref 8.5–10.1)
CHLORIDE SERPL-SCNC: 100 MMOL/L — SIGNIFICANT CHANGE UP (ref 96–108)
CO2 SERPL-SCNC: 28 MMOL/L — SIGNIFICANT CHANGE UP (ref 22–31)
CREAT SERPL-MCNC: 0.61 MG/DL — SIGNIFICANT CHANGE UP (ref 0.5–1.3)
GLUCOSE BLDC GLUCOMTR-MCNC: 110 MG/DL — HIGH (ref 70–99)
GLUCOSE BLDC GLUCOMTR-MCNC: 122 MG/DL — HIGH (ref 70–99)
GLUCOSE BLDC GLUCOMTR-MCNC: 122 MG/DL — HIGH (ref 70–99)
GLUCOSE BLDC GLUCOMTR-MCNC: 134 MG/DL — HIGH (ref 70–99)
GLUCOSE SERPL-MCNC: 114 MG/DL — HIGH (ref 70–99)
HCT VFR BLD CALC: 50.3 % — HIGH (ref 34.5–45)
HGB BLD-MCNC: 16.4 G/DL — HIGH (ref 11.5–15.5)
MAGNESIUM SERPL-MCNC: 2.2 MG/DL — SIGNIFICANT CHANGE UP (ref 1.6–2.6)
MCHC RBC-ENTMCNC: 28.8 PG — SIGNIFICANT CHANGE UP (ref 27–34)
MCHC RBC-ENTMCNC: 32.6 GM/DL — SIGNIFICANT CHANGE UP (ref 32–36)
MCV RBC AUTO: 88.4 FL — SIGNIFICANT CHANGE UP (ref 80–100)
NRBC # BLD: 0 /100 WBCS — SIGNIFICANT CHANGE UP (ref 0–0)
PHOSPHATE SERPL-MCNC: 3.6 MG/DL — SIGNIFICANT CHANGE UP (ref 2.5–4.5)
PLATELET # BLD AUTO: 190 K/UL — SIGNIFICANT CHANGE UP (ref 150–400)
POTASSIUM SERPL-MCNC: 3.8 MMOL/L — SIGNIFICANT CHANGE UP (ref 3.5–5.3)
POTASSIUM SERPL-SCNC: 3.8 MMOL/L — SIGNIFICANT CHANGE UP (ref 3.5–5.3)
RBC # BLD: 5.69 M/UL — HIGH (ref 3.8–5.2)
RBC # FLD: 13.5 % — SIGNIFICANT CHANGE UP (ref 10.3–14.5)
SODIUM SERPL-SCNC: 138 MMOL/L — SIGNIFICANT CHANGE UP (ref 135–145)
WBC # BLD: 7.58 K/UL — SIGNIFICANT CHANGE UP (ref 3.8–10.5)
WBC # FLD AUTO: 7.58 K/UL — SIGNIFICANT CHANGE UP (ref 3.8–10.5)

## 2018-12-23 PROCEDURE — 70450 CT HEAD/BRAIN W/O DYE: CPT | Mod: 26

## 2018-12-23 PROCEDURE — 99233 SBSQ HOSP IP/OBS HIGH 50: CPT

## 2018-12-23 RX ORDER — ATORVASTATIN CALCIUM 80 MG/1
40 TABLET, FILM COATED ORAL AT BEDTIME
Qty: 0 | Refills: 0 | Status: DISCONTINUED | OUTPATIENT
Start: 2018-12-23 | End: 2018-12-26

## 2018-12-23 RX ORDER — SODIUM CHLORIDE 9 MG/ML
1000 INJECTION, SOLUTION INTRAVENOUS ONCE
Qty: 0 | Refills: 0 | Status: COMPLETED | OUTPATIENT
Start: 2018-12-23 | End: 2018-12-23

## 2018-12-23 RX ORDER — ONDANSETRON 8 MG/1
4 TABLET, FILM COATED ORAL ONCE
Qty: 0 | Refills: 0 | Status: DISCONTINUED | OUTPATIENT
Start: 2018-12-23 | End: 2018-12-23

## 2018-12-23 RX ORDER — LOSARTAN POTASSIUM 100 MG/1
25 TABLET, FILM COATED ORAL DAILY
Qty: 0 | Refills: 0 | Status: DISCONTINUED | OUTPATIENT
Start: 2018-12-24 | End: 2018-12-26

## 2018-12-23 RX ADMIN — SODIUM CHLORIDE 500 MILLILITER(S): 9 INJECTION, SOLUTION INTRAVENOUS at 11:17

## 2018-12-23 RX ADMIN — ATORVASTATIN CALCIUM 40 MILLIGRAM(S): 80 TABLET, FILM COATED ORAL at 21:22

## 2018-12-23 RX ADMIN — Medication 100 MILLIGRAM(S): at 06:24

## 2018-12-23 RX ADMIN — LOSARTAN POTASSIUM 50 MILLIGRAM(S): 100 TABLET, FILM COATED ORAL at 06:24

## 2018-12-23 NOTE — PHYSICAL THERAPY INITIAL EVALUATION ADULT - GENERAL OBSERVATIONS, REHAB EVAL
Received semi-supine in bed. NAD. Cardiac monitor.  Agreeable to tx. pt slightly confused difficulty obtaining social history.

## 2018-12-23 NOTE — DIETITIAN INITIAL EVALUATION ADULT. - PERTINENT MEDS FT
MEDICATIONS  (STANDING):  dextrose 5%. 1000 milliLiter(s) (50 mL/Hr) IV Continuous <Continuous>  dextrose 50% Injectable 12.5 Gram(s) IV Push once  dextrose 50% Injectable 25 Gram(s) IV Push once  dextrose 50% Injectable 25 Gram(s) IV Push once  docusate sodium 100 milliGRAM(s) Oral two times a day  influenza   Vaccine 0.5 milliLiter(s) IntraMuscular once  insulin lispro (HumaLOG) corrective regimen sliding scale   SubCutaneous Before meals and at bedtime  lactated ringers Bolus 1000 milliLiter(s) IV Bolus once  simvastatin 40 milliGRAM(s) Oral at bedtime    MEDICATIONS  (PRN):  dextrose 40% Gel 15 Gram(s) Oral once PRN Blood Glucose LESS THAN 70 milliGRAM(s)/deciliter  glucagon  Injectable 1 milliGRAM(s) IntraMuscular once PRN Glucose LESS THAN 70 milligrams/deciliter  hydrALAZINE Injectable 10 milliGRAM(s) IV Push every 6 hours PRN sbp > 170

## 2018-12-23 NOTE — PHYSICAL THERAPY INITIAL EVALUATION ADULT - IMPAIRMENTS FOUND, PT EVAL
gait, locomotion, and balance/cognitive impairment/aerobic capacity/endurance/poor safety awareness/muscle strength

## 2018-12-23 NOTE — DIETITIAN INITIAL EVALUATION ADULT. - PERTINENT LABORATORY DATA
12-23 Na138 mmol/L Glu 114 mg/dL<H> K+ 3.8 mmol/L Cr  0.61 mg/dL BUN 16 mg/dL 12-23 Phos 3.6 mg/dL 12-21 Alb 3.6 g/dL 12-22 [id number] 6.6 %<H> 12-22 Chol 261 mg/dL<H>  mg/dL<H> HDL 75 mg/dL Trig 70 mg/dL12-21 ALT 23 U/L AST 29 U/L Alkaline Phosphatase 74 U/L

## 2018-12-23 NOTE — DIETITIAN INITIAL EVALUATION ADULT. - ENERGY NEEDS
Height (cm): 149.86 (12-21)  Weight (kg): 49.6 (12-21)  BMI (kg/m2): 22.1 (12-21)  IBW: 44.8 kg      % IBW:  106%           UBW:  ?            %UBW: ?  wt. fluctuations noted c wt. loss of 1.8 kg since adm, c 1 + edema, ? wt. accuracy

## 2018-12-23 NOTE — DIETITIAN INITIAL EVALUATION ADULT. - PATIENT MEETING ESTIMATED NUTRIENT NEEDS
"Statement Selected

## 2018-12-23 NOTE — PROGRESS NOTE ADULT - ASSESSMENT
89 years old woman with PMH: HTN, DM and HPLD, admitted with CVA s/p TPA in ER.    -Pt doing well, symptoms resolved   -repeat  CT head with small frontal lobe bleed, hold off ASA and plavix.  - MRI/MRA   - cont statin  -fup 2decho , carotid doppler without significant stenosis,   -Neurology fup  -BP control  -insulin sliding scale and fingersticks,   -diabetic teaching,   -ADA diet     CC time: 35 min

## 2018-12-23 NOTE — DIETITIAN INITIAL EVALUATION ADULT. - OTHER INFO
Pt seen due to ICU adm.  As per son, pt was not on diabetic meds PTA & that metformin was discontinued by MD.  Pt's son is unaware of pt's usual wt., no wt. changes PTA reported.  Pt is tolerating diet w/o chewing or swallowing difficulty as per RN, dentures in place.  Recommend consider swallow evaluation for possible upgrade in diet consistency.  Transfer plans to telemetry noted.

## 2018-12-23 NOTE — DIETITIAN INITIAL EVALUATION ADULT. - DIET TYPE
12/22/dysphagia 2, mechanical soft, thin liquids/consistent carbohydrate (no snacks)/DASH/TLC (sodium and cholesterol restricted diet)

## 2018-12-23 NOTE — PHYSICAL THERAPY INITIAL EVALUATION ADULT - GAIT PATTERN USED, PT EVAL
How Severe Are Your Spot(S)?: mild
What Is The Reason For Today's Visit?: Skin Lesions
What Is The Reason For Today's Visit? (Being Monitored For X): the development of new lesions
3-point gait

## 2018-12-23 NOTE — DIETITIAN INITIAL EVALUATION ADULT. - SOURCE
RN, Chart review, pt speaks [place] Creole, son reports that pt can speak some English/family/significant other/other (specify)

## 2018-12-23 NOTE — CHART NOTE - NSCHARTNOTEFT_GEN_A_CORE
HPI    88 y/o F PMH: NIDDM, HTN HLP; presented  with L sided facial droop, noticed about 11:45 AM today, pt. was fine at 11:30 AM today.  No other complaints from patient or family.  They thought she was having a stoke so they wanted to get her checked.  Pt. is acting like herself as per family.  Code stroke called ct head done and ER MD discussed case with telestroke, recommend TPA for acute stroke and ICU called for further management. As per patient son patient not on any antihypertensive and diabetic medication about 2 years. Admitted to ICU with cerebrovascular accident (CVA) due to thrombosis of right anterior cerebral artery. Facial droop improved, awake and talking. Repeat Ct heat with small hemorrhagic focus in right frontal lobe.  Now ambulating with PT this am .  Patient seen and examined by ICU attending and stable to transfer to telemetry unit for continued care.    Report given to Dr. Goff, PMLOREE  critical care

## 2018-12-24 LAB
ALBUMIN SERPL ELPH-MCNC: 3 G/DL — LOW (ref 3.3–5)
ALP SERPL-CCNC: 65 U/L — SIGNIFICANT CHANGE UP (ref 40–120)
ALT FLD-CCNC: 18 U/L — SIGNIFICANT CHANGE UP (ref 12–78)
ANION GAP SERPL CALC-SCNC: 7 MMOL/L — SIGNIFICANT CHANGE UP (ref 5–17)
AST SERPL-CCNC: 30 U/L — SIGNIFICANT CHANGE UP (ref 15–37)
BILIRUB SERPL-MCNC: 0.8 MG/DL — SIGNIFICANT CHANGE UP (ref 0.2–1.2)
BUN SERPL-MCNC: 17 MG/DL — SIGNIFICANT CHANGE UP (ref 7–23)
CALCIUM SERPL-MCNC: 8.5 MG/DL — SIGNIFICANT CHANGE UP (ref 8.5–10.1)
CHLORIDE SERPL-SCNC: 105 MMOL/L — SIGNIFICANT CHANGE UP (ref 96–108)
CO2 SERPL-SCNC: 28 MMOL/L — SIGNIFICANT CHANGE UP (ref 22–31)
CREAT SERPL-MCNC: 0.51 MG/DL — SIGNIFICANT CHANGE UP (ref 0.5–1.3)
GLUCOSE BLDC GLUCOMTR-MCNC: 102 MG/DL — HIGH (ref 70–99)
GLUCOSE BLDC GLUCOMTR-MCNC: 103 MG/DL — HIGH (ref 70–99)
GLUCOSE BLDC GLUCOMTR-MCNC: 147 MG/DL — HIGH (ref 70–99)
GLUCOSE BLDC GLUCOMTR-MCNC: 91 MG/DL — SIGNIFICANT CHANGE UP (ref 70–99)
GLUCOSE SERPL-MCNC: 99 MG/DL — SIGNIFICANT CHANGE UP (ref 70–99)
POTASSIUM SERPL-MCNC: 3.7 MMOL/L — SIGNIFICANT CHANGE UP (ref 3.5–5.3)
POTASSIUM SERPL-SCNC: 3.7 MMOL/L — SIGNIFICANT CHANGE UP (ref 3.5–5.3)
PROT SERPL-MCNC: 6.4 GM/DL — SIGNIFICANT CHANGE UP (ref 6–8.3)
SODIUM SERPL-SCNC: 140 MMOL/L — SIGNIFICANT CHANGE UP (ref 135–145)
TSH SERPL-MCNC: 1.13 UU/ML — SIGNIFICANT CHANGE UP (ref 0.36–3.74)

## 2018-12-24 PROCEDURE — 70450 CT HEAD/BRAIN W/O DYE: CPT | Mod: 26

## 2018-12-24 PROCEDURE — 70551 MRI BRAIN STEM W/O DYE: CPT | Mod: 26

## 2018-12-24 RX ADMIN — Medication 100 MILLIGRAM(S): at 05:47

## 2018-12-24 RX ADMIN — Medication 100 MILLIGRAM(S): at 17:12

## 2018-12-24 RX ADMIN — LOSARTAN POTASSIUM 25 MILLIGRAM(S): 100 TABLET, FILM COATED ORAL at 05:47

## 2018-12-24 RX ADMIN — ATORVASTATIN CALCIUM 40 MILLIGRAM(S): 80 TABLET, FILM COATED ORAL at 21:45

## 2018-12-24 NOTE — DISCHARGE NOTE ADULT - SECONDARY DIAGNOSIS.
HTN (hypertension) Type 2 diabetes mellitus without complication, without long-term current use of insulin Intracerebral bleed

## 2018-12-24 NOTE — DISCHARGE NOTE ADULT - PLAN OF CARE
avoidance of  recurrence continue  with  statin  mintor  clincally  and  with  CT of  barin continue  with  statin  monitor  clinically  and  with  CT of  brain

## 2018-12-24 NOTE — OCCUPATIONAL THERAPY INITIAL EVALUATION ADULT - PERTINENT HX OF CURRENT PROBLEM, REHAB EVAL
Pt admitted from ED with c/o facial droop. TPA 12/21. Imaging reveals hemorrhagic infarct right MCA & small focus right frontal lobe.

## 2018-12-24 NOTE — OCCUPATIONAL THERAPY INITIAL EVALUATION ADULT - ADDITIONAL COMMENTS
Patient is unreliable historian. Patient's son, Demarco, reports via phone call that patient lives with her son, daughter-in-law and granddaughter in private house with 2 steps to enter with hand rail. Patient resides on main level and family resides on second floor. Son Demarco and daughter-in-law report that patient performed basic ADLs and functional mobility without assistive device prior to admission with supervision from family who live in the house. Son reports cognitive decline at baseline prior to admission with more recent decrease with admission. Son reports that he and his family will continue to supervise patient upon d/c home. Patient is unreliable historian. Patient's son, Demarco, reports via phone call ([phone]) that patient lives with her son, daughter-in-law and granddaughter in private house with 2 steps to enter with hand rail. Patient resides on main level and family resides on second floor. Son Demarco and daughter-in-law report that patient performed basic ADLs and functional mobility without assistive device prior to admission with supervision from family who live in the house. Son reports cognitive decline at baseline prior to admission with more recent decrease with admission. Son reports that he and his family will continue to supervise patient upon d/c home.

## 2018-12-24 NOTE — DISCHARGE NOTE ADULT - PATIENT PORTAL LINK FT
You can access the [facility] Portal, offered by [facility], by registering with the following website: [email or web address]

## 2018-12-24 NOTE — DISCHARGE NOTE ADULT - CARE PLAN
Principal Discharge DX:	Cerebrovascular accident (CVA) due to thrombosis of right anterior cerebral artery  Goal:	avoidance of  recurrence  Assessment and plan of treatment:	continue  with  statin  mintor  clincally  and  with  CT of  barin  Secondary Diagnosis:	HTN (hypertension)  Secondary Diagnosis:	Type 2 diabetes mellitus without complication, without long-term current use of insulin  Secondary Diagnosis:	Intracerebral bleed Principal Discharge DX:	Cerebrovascular accident (CVA) due to thrombosis of right anterior cerebral artery  Goal:	avoidance of  recurrence  Assessment and plan of treatment:	continue  with  statin  monitor  clinically  and  with  CT of  brain  Secondary Diagnosis:	HTN (hypertension)  Secondary Diagnosis:	Type 2 diabetes mellitus without complication, without long-term current use of insulin  Secondary Diagnosis:	Intracerebral bleed

## 2018-12-24 NOTE — DISCHARGE NOTE ADULT - HOSPITAL COURSE
patient  underwent  TPA  admitted  to  ICU subsequent  CT  of  brain +  for  intracerebral bleed  patient  taken  off  all  anticoagulation   transferred  to stepdown unit  where  patient  was  noted  nighttime  insomnia  and  AM  somnolence  rrpet  Ct  of  Brai  and  Mri  showed stable bed   no  further  bleeding  sites  patient  with  worse  confusion  brom  baseline  as per  pt's  family  ambulating  with  assistance  discharged  to  home  with  home care  as per  pt's  family request

## 2018-12-24 NOTE — DISCHARGE NOTE ADULT - MEDICATION SUMMARY - MEDICATIONS TO TAKE
I will START or STAY ON the medications listed below when I get home from the hospital:    losartan 25 mg oral tablet  -- 1 tab(s) by mouth once a day  -- Indication: For HTN (hypertension)    atorvastatin 40 mg oral tablet  -- 1 tab(s) by mouth once a day (at bedtime)  -- Indication: For Cerebrovascular accident (CVA) due to thrombosis of right anterior cerebral artery

## 2018-12-24 NOTE — OCCUPATIONAL THERAPY INITIAL EVALUATION ADULT - ANTICIPATED DISCHARGE DISPOSITION, OT EVAL
Home with supervision. TATIANA Johnson made aware of recommendation. Son Demarco made aware of recommendation and in agreement./home w/ OT

## 2018-12-24 NOTE — DISCHARGE NOTE ADULT - CARE PROVIDER_API CALL
Danny Alfred (MD), Internal Medicine  [address]  [place] [address]  Phone: [phone]  Fax: [phone]    PMD,   Phone: (   )    -  Fax: (   )    -

## 2018-12-24 NOTE — DISCHARGE NOTE ADULT - HOME CARE AGENCY
RN from [facility], [phone], will visit your home the day after discharge to arrange supportive home care services.  Skilled Nursing and Home Health Aide evaluation will be provided.

## 2018-12-25 LAB
ALBUMIN SERPL ELPH-MCNC: 3 G/DL — LOW (ref 3.3–5)
ALP SERPL-CCNC: 66 U/L — SIGNIFICANT CHANGE UP (ref 40–120)
ALT FLD-CCNC: 19 U/L — SIGNIFICANT CHANGE UP (ref 12–78)
ANION GAP SERPL CALC-SCNC: 9 MMOL/L — SIGNIFICANT CHANGE UP (ref 5–17)
AST SERPL-CCNC: 23 U/L — SIGNIFICANT CHANGE UP (ref 15–37)
BILIRUB SERPL-MCNC: 0.6 MG/DL — SIGNIFICANT CHANGE UP (ref 0.2–1.2)
BUN SERPL-MCNC: 23 MG/DL — SIGNIFICANT CHANGE UP (ref 7–23)
CALCIUM SERPL-MCNC: 8.5 MG/DL — SIGNIFICANT CHANGE UP (ref 8.5–10.1)
CHLORIDE SERPL-SCNC: 104 MMOL/L — SIGNIFICANT CHANGE UP (ref 96–108)
CO2 SERPL-SCNC: 26 MMOL/L — SIGNIFICANT CHANGE UP (ref 22–31)
CREAT SERPL-MCNC: 0.65 MG/DL — SIGNIFICANT CHANGE UP (ref 0.5–1.3)
GLUCOSE BLDC GLUCOMTR-MCNC: 108 MG/DL — HIGH (ref 70–99)
GLUCOSE BLDC GLUCOMTR-MCNC: 117 MG/DL — HIGH (ref 70–99)
GLUCOSE BLDC GLUCOMTR-MCNC: 129 MG/DL — HIGH (ref 70–99)
GLUCOSE BLDC GLUCOMTR-MCNC: 98 MG/DL — SIGNIFICANT CHANGE UP (ref 70–99)
GLUCOSE SERPL-MCNC: 96 MG/DL — SIGNIFICANT CHANGE UP (ref 70–99)
HCT VFR BLD CALC: 47.4 % — HIGH (ref 34.5–45)
HGB BLD-MCNC: 15.3 G/DL — SIGNIFICANT CHANGE UP (ref 11.5–15.5)
MCHC RBC-ENTMCNC: 28.8 PG — SIGNIFICANT CHANGE UP (ref 27–34)
MCHC RBC-ENTMCNC: 32.3 GM/DL — SIGNIFICANT CHANGE UP (ref 32–36)
MCV RBC AUTO: 89.3 FL — SIGNIFICANT CHANGE UP (ref 80–100)
NRBC # BLD: 0 /100 WBCS — SIGNIFICANT CHANGE UP (ref 0–0)
PLATELET # BLD AUTO: 191 K/UL — SIGNIFICANT CHANGE UP (ref 150–400)
POTASSIUM SERPL-MCNC: 3.9 MMOL/L — SIGNIFICANT CHANGE UP (ref 3.5–5.3)
POTASSIUM SERPL-SCNC: 3.9 MMOL/L — SIGNIFICANT CHANGE UP (ref 3.5–5.3)
PROT SERPL-MCNC: 7.2 GM/DL — SIGNIFICANT CHANGE UP (ref 6–8.3)
RBC # BLD: 5.31 M/UL — HIGH (ref 3.8–5.2)
RBC # FLD: 13.5 % — SIGNIFICANT CHANGE UP (ref 10.3–14.5)
SODIUM SERPL-SCNC: 139 MMOL/L — SIGNIFICANT CHANGE UP (ref 135–145)
WBC # BLD: 8.4 K/UL — SIGNIFICANT CHANGE UP (ref 3.8–10.5)
WBC # FLD AUTO: 8.4 K/UL — SIGNIFICANT CHANGE UP (ref 3.8–10.5)

## 2018-12-25 RX ORDER — ATORVASTATIN CALCIUM 80 MG/1
1 TABLET, FILM COATED ORAL
Qty: 30 | Refills: 0
Start: 2018-12-25 | End: 2019-01-23

## 2018-12-25 RX ORDER — LOSARTAN POTASSIUM 100 MG/1
1 TABLET, FILM COATED ORAL
Qty: 0 | Refills: 0 | COMMUNITY
Start: 2018-12-25

## 2018-12-25 RX ADMIN — Medication 100 MILLIGRAM(S): at 17:40

## 2018-12-25 RX ADMIN — LOSARTAN POTASSIUM 25 MILLIGRAM(S): 100 TABLET, FILM COATED ORAL at 05:50

## 2018-12-25 RX ADMIN — Medication 100 MILLIGRAM(S): at 05:50

## 2018-12-25 RX ADMIN — ATORVASTATIN CALCIUM 40 MILLIGRAM(S): 80 TABLET, FILM COATED ORAL at 22:15

## 2018-12-26 VITALS
TEMPERATURE: 98 F | SYSTOLIC BLOOD PRESSURE: 136 MMHG | OXYGEN SATURATION: 97 % | HEART RATE: 60 BPM | DIASTOLIC BLOOD PRESSURE: 53 MMHG | RESPIRATION RATE: 30 BRPM

## 2018-12-26 LAB
GLUCOSE BLDC GLUCOMTR-MCNC: 155 MG/DL — HIGH (ref 70–99)
GLUCOSE BLDC GLUCOMTR-MCNC: 77 MG/DL — SIGNIFICANT CHANGE UP (ref 70–99)

## 2018-12-26 RX ADMIN — Medication 2: at 11:08

## 2018-12-26 RX ADMIN — LOSARTAN POTASSIUM 25 MILLIGRAM(S): 100 TABLET, FILM COATED ORAL at 05:55

## 2018-12-26 RX ADMIN — Medication 100 MILLIGRAM(S): at 05:55

## 2018-12-26 NOTE — PROGRESS NOTE ADULT - PROVIDER SPECIALTY LIST ADULT
Critical Care
Critical Care
Internal Medicine
Neurology
Neurology

## 2018-12-26 NOTE — PROGRESS NOTE ADULT - SUBJECTIVE AND OBJECTIVE BOX
Subjective Complaints:  Historian:    chart was reviewed      REVIEW OF SYSTEMS:  Eyes:  Good vision, no reported pain  ENT:  No sore throat, pain, runny nose, dysphagia  CV:  No pain, palpitatioins, hypo/hypertension  Resp:  No dyspnea, cough, tachypnea, wheezing  GI:  No pain, nausea, vomiting, diarrhea, constipatiion  Muscle:  No pain, weakness  Neuro:  No weakness, tingling, memory problems  Psych:  No fatigue, insomnia, mood problems, depression  Endocrine:  No polyuria, polydypsia, cold/heat intolerance    Vital Signs Last 24 Hrs  T(C): 37.1 (23 Dec 2018 12:30), Max: 37.1 (23 Dec 2018 12:30)  T(F): 98.8 (23 Dec 2018 12:30), Max: 98.8 (23 Dec 2018 12:30)  HR: 64 (23 Dec 2018 13:00) (53 - 74)  BP: 111/50 (23 Dec 2018 13:00) (81/68 - 190/92)  BP(mean): 64 (23 Dec 2018 13:00) (55 - 122)  RR: 22 (23 Dec 2018 13:00) (15 - 95)  SpO2: 95% (23 Dec 2018 13:00) (92% - 98%)    GENERAL PHYSICAL EXAM:  General:  Appears stated age, well-groomed, well-nourished, no distress  HEENT:  NC/AT, patent nares w/ pink mucosa, OP clear w/o lesions, PERRL, EOMI, conjunctivae clear, no thyromegaly, nodules, adenopathy, no JVD  Chest:  Full & symmetric excursion, no increased effort, breath sounds clear  Cardiovascular:  Regular rhythm, S1, S2, no murmur/rub/S3/S4, no carotid/femoral/abdominal bruit, radial/pedal pulses 2+, no edema  Abdomen:  Soft, non-tender, non-distended, normoactive bowel sounds, no HSM  Extremities:  Gait & station:   Digits:   Nails:   Joints, Bones, Muscles:   ROM:   Stability:  Skin:  No rash/erythema/ecchymoses/petechiae/wounds/abscess/warm/dry  Musculoskeletal:  Full ROM in all joints w/o swelling/tenderness/effusion    NEUROLOGICAL EXAM:  HENT:  Normocephalic head; atraumatic head.  Neck supple.  ENT: normal looking.  Mental State:    Alert.  Fully oriented to person, place and date.  Coherent.  Speech clear and intact.  Cooperative.  Responds appropriately.    Cranial Nerves:  II-XII:   Pupils round and reactive to light and accommodation.  Extraocular movements full.  Visual fields full (no homonymous hemianopsia).  Visual acuity wnl.  Facial symmetry intact.  Tongue midline.  Motor Functions:  Moves all extremities.  No pronator drift of UE.  Claps hand well.  Hand  intact bilaterally.  Ambulatory.    Sensory Functions:   Intact to touch and pinprick to face and extremities.    Reflexes:  Deep tendon reflexes normoactive to biceps, knees and ankles.  Babinski absent (present).  Cerebellar Testing:    Finger to nose intact.  Nystagmus absent.  Gait :hesitant     LABS:                        16.4   7.58  )-----------( 190      ( 23 Dec 2018 04:45 )             50.3     12-23    138  |  100  |  16  ----------------------------<  114<H>  3.8   |  28  |  0.61    Ca    9.2      23 Dec 2018 04:45  Phos  3.6     12-23  Mg     2.2     12-23              RADIOLOGY & ADDITIONAL STUDIES:    POCT  Blood Glucose: 17:14 (12-22 @ 17:20)  simvastatin: [Known as ZOCOR]  40 milliGRAM(s), Oral, at bedtime  Provider's Contact #: [phone] (12-22 @ 17:47)  losartan: [Known as COZAAR]  50 milliGRAM(s), Oral, daily  Special Instructions: hold for SBP <110  Provider's Contact #: [phone] (12-22 @ 17:47)  docusate sodium: [Ordered as COLACE]  100 milliGRAM(s), Oral, two times a day  Provider's Contact #: [phone] (12-22 @ 19:17)  POCT  Blood Glucose: 21:53 (12-22 @ 21:54)  risperiDONE  Disintegrating Tablet: [Ordered as RisperDAL M-Tab]  0.5 milliGRAM(s), Oral, once, Stop After 1 Doses  Administration Instructions: This is a Look-alike/Sound-alike Medication  Provider's Contact #: [phone] (12-22 @ 22:20)  ondansetron Injectable: [Ordered as ZOFRAN Injectable]  4 milliGRAM(s), IV Push, once, Stop After 1 Doses  Administration Instructions: Max IV Push dose 8 milliGRAM(s)  IF IV PUSH, ADMINISTER OVER 2-5 MIN  Provider's Contact #: [phone] (12-23 @ 08:29)  POCT  Blood Glucose: 08:54 (12-23 @ 09:01)  Transfer in Level of Care and or Service:     Transfer to Service: Telemetry    Additional Instructions:  ODALYS Prado (12-23 @ 10:11)  Transfer in Physician:     Transfer to Service: Telemetry    Transfer to Physician: [place] (12-23 @ 10:11)  Vital Signs:     Frequency:  every 4 hours (12-23 @ 10:11)  Remote Telemetry/EKG EXCEPT Off Unit Tests:     Time/Priority:  Routine (12-23 @ 10:11)  lactated ringers Bolus:   1000 milliLiter(s), IV Bolus, once, infuse over 2 Hour(s), Stop After 1 Doses  Provider's Contact #: [phone] (12-23 @ 11:18)  losartan: [Known as COZAAR]  25 milliGRAM(s), Oral, daily  Special Instructions: hold for sbp <120  Provider's Contact #: [phone] (12-23 @ 11:18)  POCT  Blood Glucose: 11:38 (12-23 @ 11:40)  atorvastatin: [Ordered as LIPITOR]  40 milliGRAM(s), Oral, at bedtime  Provider's Contact #: [phone] (12-23 @ 12:07)  Comprehensive Metabolic Panel: AM Sched. Collection: 24-Dec-2018 04:00 (12-23 @ 12:07)  Assess Neurological Status:     Type of Neuro Check:  Stroke    Frequency:  every 4 hours (12-23 @ 13:19)  CT Head No Cont: Routine   Indication: cva  Transport: Stretcher-Crib,  w/ Monitor  Provider's Contact #: [phone] (12-23 @ 14:52)  head ct no obvious hemorrhage but the interpretation was made difficult because the radiologist wrote on it     Recommendations:  Brain MRI.   DVT prophylaxis as ordered.  Medications: hold asa
HPI:  89 y/o F PMH: NIDDM, HTN HLP; presented  with L sided facial droop, noticed about 11:45 AM today, pt. was fine at 11:30 AM today.  No other complaints from patient or family.  They thought she was having a stoke so they wanted to get her checked.  Pt. is acting like herself as per family.  Code stroke called ct head done and ER MD discussed case with telestroke, recommend TPA for acute stroke and ICU called for further management. As per patient son patient not on any antihypertensive and diabetic medication about 2 years. Patient seen and examined in ER with ICU attending. (21 Dec 2018 16:11)    Over 24 Hrs: remains stable, walking in hallway with PT    PAST MEDICAL & SURGICAL HISTORY:  Hyperlipidemia  DM (diabetes mellitus)  HTN (hypertension)  No significant past surgical history      ## ROS:   CONSTITUTIONAL: No fever, chills  EYES: No eye pain, visual disturbances  ENMT:  No difficulty hearing, No sinus or throat pain  RESPIRATORY: No cough, wheezing, hemoptysis; No shortness of breath  CARDIOVASCULAR: No chest pain, palpitations  GASTROINTESTINAL: No abdominal or epigastric pain. No nausea, vomiting, or hematemesis; No diarrhea. No melena or hematochezia.  GENITOURINARY: No dysuria, frequency, hematuria  NEUROLOGICAL: No headaches  ENDOCRINE: No heat or cold intolerance  MUSCULOSKELETAL: No joint pain or swelling; No muscle, back, or extremity pain    ## O/E:  Vitals: T(C): 36.2 (12-23-18 @ 07:22), Max: 37.1 (12-22-18 @ 14:02)  HR: 66 (12-23-18 @ 12:03) (53 - 74)  BP: 100/72 (12-23-18 @ 12:03) (81/68 - 190/92)  BP(mean): 78 (12-23-18 @ 12:03) (55 - 122)  RR: 21 (12-23-18 @ 12:03) (15 - 95)  SpO2: 95% (12-23-18 @ 12:03) (92% - 98%)  Wt(kg): --  Gen: walking in hallway comfortably with PT  HEENT: PERRL, EOMI,  Resp: CTA B/L no c/r/w  CVS: S1S2 no m/r/g  Abd: soft NT/ND +BS  Ext: no c/c/e  Neuro: Awake and alert, strength 4+/5 in all extremities    12-22 @ 07:01  -  12-23 @ 07:00  --------------------------------------------------------  IN: 0 mL / OUT: 200 mL / NET: -200 mL          ## Labs:                        16.4   7.58  )-----------( 190      ( 23 Dec 2018 04:45 )             50.3     12-23    138  |  100  |  16  ----------------------------<  114<H>  3.8   |  28  |  0.61    Ca    9.2      23 Dec 2018 04:45  Phos  3.6     12-23  Mg     2.2     12-23    TPro  8.0  /  Alb  3.6  /  TBili  0.6  /  DBili  x   /  AST  29  /  ALT  23  /  AlkPhos  74  12-21    PT/INR - ( 21 Dec 2018 13:35 )   PT: 12.4 sec;   INR: 1.10 ratio         PTT - ( 21 Dec 2018 13:35 )  PTT:33.9 sec          ## Imaging: reviewed    MEDICATIONS  (STANDING):  atorvastatin 40 milliGRAM(s) Oral at bedtime  dextrose 5%. 1000 milliLiter(s) (50 mL/Hr) IV Continuous <Continuous>  dextrose 50% Injectable 12.5 Gram(s) IV Push once  dextrose 50% Injectable 25 Gram(s) IV Push once  dextrose 50% Injectable 25 Gram(s) IV Push once  docusate sodium 100 milliGRAM(s) Oral two times a day  influenza   Vaccine 0.5 milliLiter(s) IntraMuscular once  insulin lispro (HumaLOG) corrective regimen sliding scale   SubCutaneous Before meals and at bedtime    MEDICATIONS  (PRN):  dextrose 40% Gel 15 Gram(s) Oral once PRN Blood Glucose LESS THAN 70 milliGRAM(s)/deciliter  glucagon  Injectable 1 milliGRAM(s) IntraMuscular once PRN Glucose LESS THAN 70 milligrams/deciliter  hydrALAZINE Injectable 10 milliGRAM(s) IV Push every 6 hours PRN sbp > 170        ## Code status:  Goals of care discussion: [x] yes [ ] no  [x] full code  [ ] DNR  [ ] DNI  [ ] ANUPAMST
HPI:  89 y/o F PMH: NIDDM, HTN HLP; presented  with L sided facial droop, noticed about 11:45 AM today, pt. was fine at 11:30 AM today.  No other complaints from patient or family.  They thought she was having a stoke so they wanted to get her checked.  Pt. is acting like herself as per family.  Code stroke called ct head done and ER MD discussed case with telestroke, recommend TPA for acute stroke and ICU called for further management. As per patient son patient not on any antihypertensive and diabetic medication about 2 years. Patient seen and examined in ER with ICU attending. (21 Dec 2018 16:11)    Over 24 Hrs: s/p TPA yesterday, facial droop improved, awake and talking    PAST MEDICAL & SURGICAL HISTORY:  Hyperlipidemia  DM (diabetes mellitus)  HTN (hypertension)  No significant past surgical history      ## ROS:   CONSTITUTIONAL: No fever, chills  EYES: No eye pain, visual disturbances  ENMT:  No difficulty hearing, No sinus or throat pain  RESPIRATORY: No cough, wheezing, hemoptysis; No shortness of breath  CARDIOVASCULAR: No chest pain, palpitations  GASTROINTESTINAL: No abdominal or epigastric pain. No nausea, vomiting, or hematemesis; No diarrhea. No melena or hematochezia.  GENITOURINARY: No dysuria, frequency, hematuria  NEUROLOGICAL: No headaches  ENDOCRINE: No heat or cold intolerance  MUSCULOSKELETAL: No joint pain or swelling; No muscle, back, or extremity pain    ## O/E:  Vitals: T(C): 36.6 (12-22-18 @ 08:00), Max: 37.3 (12-21-18 @ 16:22)  HR: 48 (12-22-18 @ 11:00) (45 - 72)  BP: 119/56 (12-22-18 @ 11:00) (107/47 - 188/73)  BP(mean): 71 (12-22-18 @ 11:00) (62 - 132)  RR: 24 (12-22-18 @ 11:00) (13 - 35)  SpO2: 97% (12-22-18 @ 11:00) (87% - 100%)  Wt(kg): --  Gen: lying comfortably in bed in no apparent distress  HEENT: PERRL, EOMI, lt facial droop improved  Resp: CTA B/L no c/r/w  CVS: S1S2 no m/r/g  Abd: soft NT/ND +BS  Ext: no c/c/e  Neuro: Awake and alert, strength 4+/5 in all extremities      12-22 @ 07:01  -  12-22 @ 11:37  --------------------------------------------------------  IN: 0 mL / OUT: 200 mL / NET: -200 mL          ## Labs:                        16.0   8.53  )-----------( 198      ( 22 Dec 2018 04:03 )             48.9     12-22    139  |  104  |  13  ----------------------------<  110<H>  4.0   |  27  |  0.51    Ca    9.2      22 Dec 2018 04:03  Phos  3.1     12-22  Mg     2.3     12-22    TPro  8.0  /  Alb  3.6  /  TBili  0.6  /  DBili  x   /  AST  29  /  ALT  23  /  AlkPhos  74  12-21    PT/INR - ( 21 Dec 2018 13:35 )   PT: 12.4 sec;   INR: 1.10 ratio         PTT - ( 21 Dec 2018 13:35 )  PTT:33.9 sec          ## Imaging: reviewed    MEDICATIONS  (STANDING):  dextrose 5%. 1000 milliLiter(s) (50 mL/Hr) IV Continuous <Continuous>  dextrose 50% Injectable 12.5 Gram(s) IV Push once  dextrose 50% Injectable 25 Gram(s) IV Push once  dextrose 50% Injectable 25 Gram(s) IV Push once  influenza   Vaccine 0.5 milliLiter(s) IntraMuscular once  insulin lispro (HumaLOG) corrective regimen sliding scale   SubCutaneous Before meals and at bedtime    MEDICATIONS  (PRN):  dextrose 40% Gel 15 Gram(s) Oral once PRN Blood Glucose LESS THAN 70 milliGRAM(s)/deciliter  glucagon  Injectable 1 milliGRAM(s) IntraMuscular once PRN Glucose LESS THAN 70 milligrams/deciliter  hydrALAZINE Injectable 10 milliGRAM(s) IV Push every 6 hours PRN sbp > 170        ## Code status:  Goals of care discussion: [x] yes [ ] no  [x] full code  [ ] DNR  [ ] DNI  [ ] VIRGEN
INTERVAL HPI/OVERNIGHT EVENTS:        REVIEW OF SYSTEMS:  CONSTITUTIONAL:   somnol;ent  poorly  arouseable  this  am      MEDICATION:  atorvastatin 40 milliGRAM(s) Oral at bedtime  dextrose 40% Gel 15 Gram(s) Oral once PRN  dextrose 5%. 1000 milliLiter(s) IV Continuous <Continuous>  dextrose 50% Injectable 12.5 Gram(s) IV Push once  dextrose 50% Injectable 25 Gram(s) IV Push once  dextrose 50% Injectable 25 Gram(s) IV Push once  docusate sodium 100 milliGRAM(s) Oral two times a day  glucagon  Injectable 1 milliGRAM(s) IntraMuscular once PRN  influenza   Vaccine 0.5 milliLiter(s) IntraMuscular once  insulin lispro (HumaLOG) corrective regimen sliding scale   SubCutaneous Before meals and at bedtime  losartan 25 milliGRAM(s) Oral daily    Vital Signs Last 24 Hrs  T(C): 36.4 (24 Dec 2018 05:00), Max: 37.2 (23 Dec 2018 15:18)  T(F): 97.6 (24 Dec 2018 05:00), Max: 99 (23 Dec 2018 15:18)  HR: 54 (24 Dec 2018 05:00) (54 - 68)  BP: 171/64 (24 Dec 2018 05:00) (81/68 - 171/64)  BP(mean): 74 (23 Dec 2018 17:00) (55 - 78)  RR: 18 (24 Dec 2018 05:00) (11 - 95)  SpO2: 96% (24 Dec 2018 05:00) (92% - 96%)    PHYSICAL EXAM:  GENERAL: NAD, well-groomed, well-developed  EYES:  conjunctiva and sclera clear  ENMT:  Moist mucous membranes,   NECK: Supple, No JVD, Normal thyroid  NERVOUS SYSTEM:  lethergic  mumbles  unintelligeble  sounds  moves  all  extr  does  NOT  follow  commands   CHEST/LUNG: Clear    HEART: Regular rate and rhythm; No murmurs, rubs, or gallops  ABDOMEN: Soft, Nontender, Nondistended; Bowel sounds present  EXTREMITIES:  no  edema no  tenderness  SKIN: No rashes   LABS:                        16.4   7.58  )-----------( 190      ( 23 Dec 2018 04:45 )             50.3     12-24    140  |  105  |  17  ----------------------------<  99  3.7   |  28  |  0.51    Ca    8.5      24 Dec 2018 07:37  Phos  3.6     12-23  Mg     2.2     12-23    TPro  6.4  /  Alb  3.0<L>  /  TBili  0.8  /  DBili  x   /  AST  30  /  ALT  18  /  AlkPhos  65  12-24        CAPILLARY BLOOD GLUCOSE      POCT Blood Glucose.: 103 mg/dL (24 Dec 2018 07:17)  POCT Blood Glucose.: 110 mg/dL (23 Dec 2018 21:18)  POCT Blood Glucose.: 122 mg/dL (23 Dec 2018 16:23)  POCT Blood Glucose.: 122 mg/dL (23 Dec 2018 11:38)      RADIOLOGY & ADDITIONAL TESTS:    Imaging reports  Personally Reviewed:  [ ] YES  [ ] NO    Consultant(s) Notes Reviewed:  [x ] YES  [ ] NO    Care Discussed with Consultants/Other Providers [x ] YES  [ ] NO  Assessment and Plan:   Problem/Plan - 1:  ·  Problem: Cerebrovascular accident (CVA) due to thrombosis of right anterior cerebral artery.  Plan: off  AC  monitor  intracerebral bleed  change  statin  to  lipitor  40mg. STAT CT OF  BRAIN     Problem/Plan - 2:  ·  Problem: DM (diabetes mellitus).  Plan: insulin  coverage.     Problem/Plan - 3:  ·  Problem: HTN (hypertension).  Plan: cozaar  hold  for  bp < 120 systolic.
INTERVAL HPI/OVERNIGHT EVENTS:        REVIEW OF SYSTEMS:  CONSTITUTIONAL: not  sleeping  well at  night  somnolent        MEDICATION:  atorvastatin 40 milliGRAM(s) Oral at bedtime  dextrose 40% Gel 15 Gram(s) Oral once PRN  dextrose 5%. 1000 milliLiter(s) IV Continuous <Continuous>  dextrose 50% Injectable 12.5 Gram(s) IV Push once  dextrose 50% Injectable 25 Gram(s) IV Push once  dextrose 50% Injectable 25 Gram(s) IV Push once  docusate sodium 100 milliGRAM(s) Oral two times a day  glucagon  Injectable 1 milliGRAM(s) IntraMuscular once PRN  influenza   Vaccine 0.5 milliLiter(s) IntraMuscular once  insulin lispro (HumaLOG) corrective regimen sliding scale   SubCutaneous Before meals and at bedtime  losartan 25 milliGRAM(s) Oral daily    Vital Signs Last 24 Hrs  T(C): 36.5 (25 Dec 2018 05:43), Max: 36.6 (24 Dec 2018 16:35)  T(F): 97.7 (25 Dec 2018 05:43), Max: 97.9 (24 Dec 2018 16:35)  HR: 56 (25 Dec 2018 05:43) (55 - 71)  BP: 150/62 (25 Dec 2018 05:43) (136/58 - 150/62)  BP(mean): --  RR: 17 (25 Dec 2018 05:43) (17 - 18)  SpO2: 96% (25 Dec 2018 05:43) (93% - 96%)    PHYSICAL EXAM:  GENERAL: NAD, well-groomed, well-developed  EYES:  conjunctiva and sclera clear  ENMT:  Moist mucous membranes,   NECK: Supple, No JVD, Normal thyroid  NERVOUS SYSTEM:  somnolent  arouseable confused    no  focal  deficits;   CHEST/LUNG: Clear    HEART: Regular rate and rhythm; No murmurs, rubs, or gallops  ABDOMEN: Soft, Nontender, Nondistended; Bowel sounds present  EXTREMITIES:  no  edema no  tenderness  SKIN: No rashes   LABS:                        15.3   8.40  )-----------( 191      ( 25 Dec 2018 07:46 )             47.4     12-25    139  |  104  |  23  ----------------------------<  96  3.9   |  26  |  0.65    Ca    8.5      25 Dec 2018 07:46    TPro  7.2  /  Alb  3.0<L>  /  TBili  0.6  /  DBili  x   /  AST  23  /  ALT  19  /  AlkPhos  66  12-25        CAPILLARY BLOOD GLUCOSE      POCT Blood Glucose.: 117 mg/dL (25 Dec 2018 08:01)  POCT Blood Glucose.: 102 mg/dL (24 Dec 2018 21:42)  POCT Blood Glucose.: 147 mg/dL (24 Dec 2018 15:47)  POCT Blood Glucose.: 91 mg/dL (24 Dec 2018 11:30)      < from: MR Head No Cont (12.24.18 @ 17:44) >  INTERPRETATION:    Addendum created by Shar Johnson MD on 12/24/2018 6:33:48 PM EST     THIS REPORT CONTAINS FINDINGS THAT MAY BE CRITICAL TO PATIENT CARE. The   findings were verbally communicated via telephone conference with Dr Goff at   6:33 PM EST on 12/24/2018. The findings were acknowledged and understood.    Initial report created on 12/24/2018 6:16:06 PM EST     EXAM:    MR Head Without Contrast     EXAM DATE/TIME:    12/24/2018 5:38 PM   < from: CT Head No Cont (12.24.18 @ 11:33) >  EXAM:  CT BRAIN                            PROCEDURE DATE:  12/24/2018          INTERPRETATION:  CT BRAIN    HISTORY:  Change in mental status status post TPA and hemorrhagic infarct   transformation    TECHNIQUE: CT of the head was performed without intravenous contrast.   Multiplanar reformatted images were then generated from the axial   acquired data.  This study was performed using automatic exposure control   (radiation dose reduction software) to obtain a diagnostic image quality   scan with patient dose as low as reasonably achievable.    COMPARISON: Head CT one day prior    FINDINGS:     INTRACRANIAL FINDINGS: Stable small foci of right frontal parenchymal   hemorrhage without mass effect or midline shift. No hydrocephalus. There   is no abnormal extra axial collection.    EXTRACRANIAL FINDINGS: No extracalvarial soft tissue swelling. No   depressed calvarial fracture. The orbital contents are unremarkable. The   visualized paranasal sinuses are well aerated. The mastoid air cells are   clear.    IMPRESSION:     Stable small foci of right frontal parenchymal hemorrhage without mass   effect or midline shift. No new hemorrhage.                < end of copied text >    CLINICAL HISTORY:    89 years old, female; Signs and symptoms; Altered mental status/memory   loss;   Confusion or disorientation     TECHNIQUE:    MR of the head without contrast.     COMPARISON:    CT HEAD 12/24/2018 10:39 AM     FINDINGS:    Brain:  Multiple small right hemispheric acute infarcts in the right   frontal, right parietal,  right posterior temporal, and left cerebellum.   There are periventricular and subcortical areas of flair high signal   consistent with chronic small vessel ischemic disease. The cortical sulci   are enlarged consistent with cerebral atrophy.    Ventricles:  The ventricles are mildly enlarged.    Bones/joints: Unremarkable.    Soft tissues: Normal.    Sinuses: Normal as visualized. No acute sinusitis.    Mastoid air cells: Normal as visualized. No mastoid effusion.    Orbits: Unremarkable.     IMPRESSION:   Multiple small right hemispheric acute infarcts in the right frontal,   right parietal,  right posterior temporal, and left cerebellum.           < end of copied text >    RADIOLOGY & ADDITIONAL TESTS:    Imaging reports  Personally Reviewed:  [ x] YES  [ ] NO    Consultant(s) Notes Reviewed:  [ x] YES  [ ] NO    Care Discussed with Consultants/Other Providers [ x] YES  [ ] NO  Assessment and Plan:   Problem/Plan - 1:  ·  Problem: Cerebrovascular accident (CVA) due to thrombosis of right anterior cerebral artery.  Plan: off  AC  monitor  intracerebral bleed  change  statin  to  lipitor  40mg  results  discussed  with  pt's  son  at  bedside  discharge  home  after  eeg  with  neuro  reccomnedations    Problem/Plan - 2:  ·  Problem: DM (diabetes mellitus).  Plan: insulin  coverage.     Problem/Plan - 3:  ·  Problem: HTN (hypertension).  Plan: cozaar  hold  for  bp < 120 systolic.
INTERVAL HPI/OVERNIGHT EVENTS:        REVIEW OF SYSTEMS:  CONSTITUTIONAL:feels well  no  complaints    NECK: No pain or stiffnes  RESPIRATORY: No SOB   CARDIOVASCULAR: No chest pain, palpitations, dizziness,   GASTROINTESTINAL: No abdominal pain. No nausea, vomiting,   NEUROLOGICAL: No headaches, no  blurry  vision no  dizziness  SKIN: No itching,   MUSCULOSKELETAL: No pain    MEDICATION:  dextrose 40% Gel 15 Gram(s) Oral once PRN  dextrose 5%. 1000 milliLiter(s) IV Continuous <Continuous>  dextrose 50% Injectable 12.5 Gram(s) IV Push once  dextrose 50% Injectable 25 Gram(s) IV Push once  dextrose 50% Injectable 25 Gram(s) IV Push once  docusate sodium 100 milliGRAM(s) Oral two times a day  glucagon  Injectable 1 milliGRAM(s) IntraMuscular once PRN  hydrALAZINE Injectable 10 milliGRAM(s) IV Push every 6 hours PRN  influenza   Vaccine 0.5 milliLiter(s) IntraMuscular once  insulin lispro (HumaLOG) corrective regimen sliding scale   SubCutaneous Before meals and at bedtime  simvastatin 40 milliGRAM(s) Oral at bedtime    Vital Signs Last 24 Hrs  T(C): 36.2 (23 Dec 2018 07:22), Max: 37.1 (22 Dec 2018 14:02)  T(F): 97.2 (23 Dec 2018 07:22), Max: 98.7 (22 Dec 2018 14:02)  HR: 62 (23 Dec 2018 11:29) (53 - 74)  BP: 100/50 (23 Dec 2018 11:29) (87/56 - 190/92)  BP(mean): 62 (23 Dec 2018 11:29) (55 - 122)  RR: 22 (23 Dec 2018 11:29) (15 - 95)  SpO2: 94% (23 Dec 2018 11:29) (92% - 98%)    PHYSICAL EXAM:  GENERAL: NAD, well-groomed, well-developed  EYES:  conjunctiva and sclera clear  ENMT:  Moist mucous membranes,   NECK: Supple, No JVD, Normal thyroid  NERVOUS SYSTEM:  Alert oriented x2  no  focal  deficits;   CHEST/LUNG: Clear    HEART: Regular rate and rhythm; No murmurs, rubs, or gallops  ABDOMEN: Soft, Nontender, Nondistended; Bowel sounds present  EXTREMITIES:  no  edema no  tenderness  SKIN: No rashes   LABS:                        16.4   7.58  )-----------( 190      ( 23 Dec 2018 04:45 )             50.3     12-23    138  |  100  |  16  ----------------------------<  114<H>  3.8   |  28  |  0.61    Ca    9.2      23 Dec 2018 04:45  Phos  3.6     12-23  Mg     2.2     12-23    TPro  8.0  /  Alb  3.6  /  TBili  0.6  /  DBili  x   /  AST  29  /  ALT  23  /  AlkPhos  74  12-21    PT/INR - ( 21 Dec 2018 13:35 )   PT: 12.4 sec;   INR: 1.10 ratio         PTT - ( 21 Dec 2018 13:35 )  PTT:33.9 sec    CAPILLARY BLOOD GLUCOSE      POCT Blood Glucose.: 122 mg/dL (23 Dec 2018 11:38)  POCT Blood Glucose.: 134 mg/dL (23 Dec 2018 08:54)  POCT Blood Glucose.: 140 mg/dL (22 Dec 2018 21:53)  POCT Blood Glucose.: 102 mg/dL (22 Dec 2018 17:14)  < from: CT Head No Cont (12.22.18 @ 17:01) >  EXAM:  CT BRAIN                            PROCEDURE DATE:  12/22/2018          INTERPRETATION:  CT BRAIN     Axial sections were obtained from base to vertex without contrast.    Clinical History: Follow-up CVA status post TPA    Comparison: 12/21    FINDINGS:    Evaluation of the brain vertex was limited by motion.    5 mm hemorrhagic focus in the right opercula on 2:30.    There is no mass, mass effect or midline shift. Extensive small vessel   ischemic changes, grossly stable. Ventricular system and sulcal pattern   are within normal limits for the patient's age.    The visualized sinuses and mastoid air cells are unremarkable.    Bones and soft tissues are normal.     IMPRESSION: Small hemorrhagic focus in the right frontal lobe. Overall   motion degraded study. Findings were discussed with Physician: RUFUS PARK   [phone] with a read back 5:28 PM.       < end of copied text >      RADIOLOGY & ADDITIONAL TESTS:    Imaging reports  Personally Reviewed:  [ ] YES  [ ] NO    Consultant(s) Notes Reviewed:  [ ] YES  [ ] NO    Care Discussed with Consultants/Other Providers [ ] YES  [ ] NO
INTERVAL HPI/OVERNIGHT EVENTS:  was  not  able  to  be  discharged  12/25/18      REVIEW OF SYSTEMS:  CONSTITUTIONAL:  no  complaints      MEDICATION:  atorvastatin 40 milliGRAM(s) Oral at bedtime  dextrose 40% Gel 15 Gram(s) Oral once PRN  dextrose 5%. 1000 milliLiter(s) IV Continuous <Continuous>  dextrose 50% Injectable 12.5 Gram(s) IV Push once  dextrose 50% Injectable 25 Gram(s) IV Push once  dextrose 50% Injectable 25 Gram(s) IV Push once  docusate sodium 100 milliGRAM(s) Oral two times a day  glucagon  Injectable 1 milliGRAM(s) IntraMuscular once PRN  influenza   Vaccine 0.5 milliLiter(s) IntraMuscular once  insulin lispro (HumaLOG) corrective regimen sliding scale   SubCutaneous Before meals and at bedtime  losartan 25 milliGRAM(s) Oral daily    Vital Signs Last 24 Hrs  T(C): 36.7 (26 Dec 2018 05:35), Max: 36.9 (25 Dec 2018 17:27)  T(F): 98.1 (26 Dec 2018 05:35), Max: 98.5 (25 Dec 2018 17:27)  HR: 59 (26 Dec 2018 05:35) (56 - 61)  BP: 147/63 (26 Dec 2018 05:35) (138/67 - 147/63)  BP(mean): --  RR: 17 (26 Dec 2018 05:35) (16 - 18)  SpO2: 98% (26 Dec 2018 05:35) (95% - 98%)    PHYSICAL EXAM:  GENERAL: NAD, well-groomed, well-developed  EYES:  conjunctiva and sclera clear  ENMT:  Moist mucous membranes,   NECK: Supple, No JVD, Normal thyroid  NERVOUS SYSTEM:  Alert confused no  focal  deficits;   CHEST/LUNG: Clear    HEART: Regular rate and rhythm; No murmurs, rubs, or gallops  ABDOMEN: Soft, Nontender, Nondistended; Bowel sounds present  EXTREMITIES:  no  edema no  tenderness  SKIN: No rashes   LABS:                        15.3   8.40  )-----------( 191      ( 25 Dec 2018 07:46 )             47.4     12-25    139  |  104  |  23  ----------------------------<  96  3.9   |  26  |  0.65    Ca    8.5      25 Dec 2018 07:46    TPro  7.2  /  Alb  3.0<L>  /  TBili  0.6  /  DBili  x   /  AST  23  /  ALT  19  /  AlkPhos  66  12-25        CAPILLARY BLOOD GLUCOSE      POCT Blood Glucose.: 108 mg/dL (25 Dec 2018 21:21)  POCT Blood Glucose.: 129 mg/dL (25 Dec 2018 16:53)  POCT Blood Glucose.: 98 mg/dL (25 Dec 2018 12:14)      RADIOLOGY & ADDITIONAL TESTS:    Imaging reports  Personally Reviewed:  [ ] YES  [ ] NO    Consultant(s) Notes Reviewed:  [ ] YES  [ ] NO    Care Discussed with Consultants/Other Providers [x ] YES  [ ] NO  Assessment and Plan:   Problem/Plan - 1:  ·  Problem: Cerebrovascular accident (CVA) due to thrombosis of right anterior cerebral artery.  Plan: off  AC  monitor  intracerebral bleed  change  statin  to  lipitor  40mg  results  discussed  with  pt's  son  at  bedside  discharge  home  after  eeg  with  neuro  reccomnedations    Problem/Plan - 2:  ·  Problem: DM (diabetes mellitus).  Plan: insulin  coverage.     Problem/Plan - 3:  ·  Problem: HTN (hypertension).  Plan: cozaar  hold  for  bp < 120 systolic.
Subjective Complaints:  Historian:       Consult requested by ER doctor:                  Attending: DR Goff     HPI:  89 y/o F PMH: NIDDM, HTN HLP; presented  with L sided facial droop, noticed about 11:45 AM today, pt. was fine at 11:30 AM today.  No other complaints from patient or family.  They thought she was having a stoke so they wanted to get her checked.  Pt. is acting like herself as per family.  Code stroke called ct head done and ER MD discussed case with telestroke, recommend TPA for acute stroke and ICU called for further management. As per patient son patient not on any antihypertensive and diabetic medication about 2 years. Patient seen and examined in ER with ICU attending. (21 Dec 2018 16:11)    TANYA OROURKE    PAST MEDICAL & SURGICAL HISTORY:  Hyperlipidemia  DM (diabetes mellitus)  HTN (hypertension)  No significant past surgical history  89yFemale    MEDICATIONS  (STANDING):  atorvastatin 40 milliGRAM(s) Oral at bedtime  dextrose 5%. 1000 milliLiter(s) (50 mL/Hr) IV Continuous <Continuous>  dextrose 50% Injectable 12.5 Gram(s) IV Push once  dextrose 50% Injectable 25 Gram(s) IV Push once  dextrose 50% Injectable 25 Gram(s) IV Push once  docusate sodium 100 milliGRAM(s) Oral two times a day  influenza   Vaccine 0.5 milliLiter(s) IntraMuscular once  insulin lispro (HumaLOG) corrective regimen sliding scale   SubCutaneous Before meals and at bedtime  losartan 25 milliGRAM(s) Oral daily    MEDICATIONS  (PRN):  dextrose 40% Gel 15 Gram(s) Oral once PRN Blood Glucose LESS THAN 70 milliGRAM(s)/deciliter  glucagon  Injectable 1 milliGRAM(s) IntraMuscular once PRN Glucose LESS THAN 70 milligrams/deciliter      Allergies    No Known Allergies    Intolerances      FAMILY HISTORY:  No pertinent family history in first degree relatives      REVIEW OF SYSTEMS:  General:  No wt loss, fevers, chills, night sweats  Eyes:  Good vision, no reported pain  ENT:  No sore throat, pain, runny nose, dysphagia  CV:  No pain, palpitatioins, hypo/hypertension  Resp:  No dyspnea, cough, tachypnea, wheezing  GI:  No pain, nausea, vomiting, diarrhea, constipatiion  :  No pain, bleeding, incontinence, nocturia  Muscle:  No pain, weakness  Breast:  No pain, abscess, mass, discharge  Neuro:  No weakness, tingling, memory problems  Psych:  No fatigue, insomnia, mood problems, depression  Endocrine:  No polyuria, polydypsia, cold/heat intolerance  Heme:  No petechiae, ecchymosis, easy bruisability  Skin:  No rash, tattoos, scars, edema      Vital Signs Last 24 Hrs  T(C): 36.4 (24 Dec 2018 13:18), Max: 36.8 (23 Dec 2018 18:26)  T(F): 97.6 (24 Dec 2018 13:18), Max: 98.2 (23 Dec 2018 18:26)  HR: 56 (24 Dec 2018 12:32) (47 - 66)  BP: 136/58 (24 Dec 2018 12:32) (114/62 - 171/64)  BP(mean): 74 (23 Dec 2018 17:00) (74 - 74)  RR: 18 (24 Dec 2018 05:00) (11 - 30)  SpO2: 96% (24 Dec 2018 05:00) (95% - 96%)    GENERAL PHYSICAL EXAM:  General:  Appears stated age, well-groomed, well-nourished, no distress  HEENT:  NC/AT, patent nares w/ pink mucosa, OP clear w/o lesions, PERRL, EOMI, conjunctivae clear, no thyromegaly, nodules, adenopathy, no JVD  Chest:  Full & symmetric excursion, no increased effort, breath sounds clear  Cardiovascular:  Regular rhythm, S1, S2, no murmur/rub/S3/S4, no carotid/femoral/abdominal bruit, radial/pedal pulses 2+, no edema  Abdomen:  Soft, non-tender, non-distended, normoactive bowel sounds, no HSM  Extremities:  Gait & station:   Digits:   Nails:   Joints, Bones, Muscles:   ROM:   Stability:  Skin:  No rash/erythema/ecchymoses/petechiae/wounds/abscess/warm/dry  Musculoskeletal:  Full ROM in all joints w/o swelling/tenderness/effusion    NEUROLOGICAL EXAM:  HENT:  Normocephalic head; atraumatic head.  Neck supple.  ENT: normal looking.  Mental State:    Alert.  Fully oriented to self   Speech clear and intact.  Cooperative.  Responds appropriately.    Cranial Nerves:  II-XII:   Pupils round and reactive to light and accommodation.  Extraocular movements full.  Visual fields full (no homonymous hemianopsia).  Visual acuity wnl.  Facial symmetry intact.  Tongue midline.  Motor Functions:  Moves all extremities.  No pronator drift of UE.  Claps hand well.  Hand  intact bilaterally.  Ambulatory.    Sensory Functions:   Intact to touch and pinprick to face and extremities.    Reflexes:  Deep tendon reflexes normoactive to biceps, knees and ankles.  Babinski absent (present).  Cerebellar Testing:    Finger to nose intact.  Nystagmus absent.  gait : hesitant      LABS:                        16.4   7.58  )-----------( 190      ( 23 Dec 2018 04:45 )             50.3     12-24    140  |  105  |  17  ----------------------------<  99  3.7   |  28  |  0.51    Ca    8.5      24 Dec 2018 07:37  Phos  3.6     12-23  Mg     2.2     12-23    TPro  6.4  /  Alb  3.0<L>  /  TBili  0.8  /  DBili  x   /  AST  30  /  ALT  18  /  AlkPhos  65  12-24            RADIOLOGY & ADDITIONAL STUDIES:    POCT  Blood Glucose: 16:23 (12-23 @ 16:24)  POCT  Blood Glucose: 21:18 (12-23 @ 21:19)  POCT  Blood Glucose: 07:17 (12-24 @ 07:18)  CT Head No Cont: Urgent   Indication: chage  in  mental  status  Transport: Stretcher-Crib,  w/ Monitor  Exam Completed  Provider's Contact #: [phone] (12-24 @ 09:42)  Complete Blood Count: AM Sched. Collection: 25-Dec-2018 05:00 (12-24 @ 09:42)  Comprehensive Metabolic Panel: AM Sched. Collection: 25-Dec-2018 05:00 (12-24 @ 09:42)  Thyroid Stimulating Hormone, Serum: STAT  Addl Info: please add to this am lab  Cancel Reason: Lab Reord. (12-24 @ 10:10)  Thyroid Stimulating Hormone, Serum: 06:48 (12-24 @ 10:27)  POCT  Blood Glucose: 11:30 (12-24 @ 11:31)  POCT  Blood Glucose: 15:47 (12-24 @ 15:49)      DX RIGHT FRONTAL INFARCT WITH SECONDARY HEMORRHAGE     Recommendations:  Brain MRI.  Carotid doppler.  Echocardiogram.  EEG.   DVT prophylaxis as ordered.  Medications:  HOLD ASA

## 2018-12-26 NOTE — PROGRESS NOTE ADULT - REASON FOR ADMISSION
89y Female complaining of slurred speech
89y Female complaining of slurred speech  CVA  s/p  TPA
89y Female complaining of slurred speech

## 2018-12-28 DIAGNOSIS — E11.9 TYPE 2 DIABETES MELLITUS WITHOUT COMPLICATIONS: ICD-10-CM

## 2018-12-28 DIAGNOSIS — G47.00 INSOMNIA, UNSPECIFIED: ICD-10-CM

## 2018-12-28 DIAGNOSIS — Z87.891 PERSONAL HISTORY OF NICOTINE DEPENDENCE: ICD-10-CM

## 2018-12-28 DIAGNOSIS — R40.0 SOMNOLENCE: ICD-10-CM

## 2018-12-28 DIAGNOSIS — R13.10 DYSPHAGIA, UNSPECIFIED: ICD-10-CM

## 2018-12-28 DIAGNOSIS — R47.81 SLURRED SPEECH: ICD-10-CM

## 2018-12-28 DIAGNOSIS — I10 ESSENTIAL (PRIMARY) HYPERTENSION: ICD-10-CM

## 2018-12-28 DIAGNOSIS — Z91.14 PATIENT'S OTHER NONCOMPLIANCE WITH MEDICATION REGIMEN: ICD-10-CM

## 2018-12-28 DIAGNOSIS — I63.321 CEREBRAL INFARCTION DUE TO THROMBOSIS OF RIGHT ANTERIOR CEREBRAL ARTERY: ICD-10-CM

## 2018-12-28 DIAGNOSIS — Z92.82 STATUS POST ADMINISTRATION OF TPA (RTPA) IN A DIFFERENT FACILITY WITHIN THE LAST 24 HOURS PRIOR TO ADMISSION TO CURRENT FACILITY: ICD-10-CM

## 2018-12-28 DIAGNOSIS — R29.810 FACIAL WEAKNESS: ICD-10-CM

## 2018-12-28 DIAGNOSIS — E78.5 HYPERLIPIDEMIA, UNSPECIFIED: ICD-10-CM

## 2019-01-14 NOTE — ED ADULT NURSE NOTE - CCCP TRG CHIEF CMPLNT
LM for pt that Dr Bentley Search is out ill and we need to reschedule her appointment today.  Pt can be offered a with permission spot on 1/30/19) ANDREI TEMPLETON [id number] slurred speech

## 2019-08-09 ENCOUNTER — INPATIENT (INPATIENT)
Facility: HOSPITAL | Age: 84
LOS: 5 days | Discharge: NURSING, REHAB OR LONG-TERM CARE | End: 2019-08-15
Attending: FAMILY MEDICINE | Admitting: FAMILY MEDICINE
Payer: MEDICARE

## 2019-08-09 VITALS
RESPIRATION RATE: 20 BRPM | WEIGHT: 126.99 LBS | HEIGHT: 64 IN | SYSTOLIC BLOOD PRESSURE: 150 MMHG | OXYGEN SATURATION: 97 % | HEART RATE: 94 BPM | TEMPERATURE: 98 F | DIASTOLIC BLOOD PRESSURE: 70 MMHG

## 2019-08-09 LAB
ALBUMIN SERPL ELPH-MCNC: 3 G/DL — LOW (ref 3.3–5)
ALP SERPL-CCNC: 80 U/L — SIGNIFICANT CHANGE UP (ref 40–120)
ALT FLD-CCNC: 33 U/L — SIGNIFICANT CHANGE UP (ref 12–78)
ANION GAP SERPL CALC-SCNC: 5 MMOL/L — SIGNIFICANT CHANGE UP (ref 5–17)
ANION GAP SERPL CALC-SCNC: 7 MMOL/L — SIGNIFICANT CHANGE UP (ref 5–17)
APTT BLD: 22.6 SEC — LOW (ref 27.5–36.3)
AST SERPL-CCNC: 21 U/L — SIGNIFICANT CHANGE UP (ref 15–37)
BILIRUB SERPL-MCNC: 0.5 MG/DL — SIGNIFICANT CHANGE UP (ref 0.2–1.2)
BUN SERPL-MCNC: 21 MG/DL — SIGNIFICANT CHANGE UP (ref 7–23)
BUN SERPL-MCNC: 22 MG/DL — SIGNIFICANT CHANGE UP (ref 7–23)
CALCIUM SERPL-MCNC: 8.5 MG/DL — SIGNIFICANT CHANGE UP (ref 8.5–10.1)
CALCIUM SERPL-MCNC: 8.7 MG/DL — SIGNIFICANT CHANGE UP (ref 8.5–10.1)
CHLORIDE SERPL-SCNC: 105 MMOL/L — SIGNIFICANT CHANGE UP (ref 96–108)
CHLORIDE SERPL-SCNC: 107 MMOL/L — SIGNIFICANT CHANGE UP (ref 96–108)
CO2 SERPL-SCNC: 28 MMOL/L — SIGNIFICANT CHANGE UP (ref 22–31)
CO2 SERPL-SCNC: 29 MMOL/L — SIGNIFICANT CHANGE UP (ref 22–31)
CREAT SERPL-MCNC: 0.66 MG/DL — SIGNIFICANT CHANGE UP (ref 0.5–1.3)
CREAT SERPL-MCNC: 0.75 MG/DL — SIGNIFICANT CHANGE UP (ref 0.5–1.3)
GLUCOSE SERPL-MCNC: 210 MG/DL — HIGH (ref 70–99)
GLUCOSE SERPL-MCNC: 251 MG/DL — HIGH (ref 70–99)
HCT VFR BLD CALC: 43.5 % — SIGNIFICANT CHANGE UP (ref 34.5–45)
HGB BLD-MCNC: 13.5 G/DL — SIGNIFICANT CHANGE UP (ref 11.5–15.5)
INR BLD: 1.18 RATIO — HIGH (ref 0.88–1.16)
MCHC RBC-ENTMCNC: 28.4 PG — SIGNIFICANT CHANGE UP (ref 27–34)
MCHC RBC-ENTMCNC: 31 GM/DL — LOW (ref 32–36)
MCV RBC AUTO: 91.6 FL — SIGNIFICANT CHANGE UP (ref 80–100)
NRBC # BLD: 0 /100 WBCS — SIGNIFICANT CHANGE UP (ref 0–0)
PLATELET # BLD AUTO: 200 K/UL — SIGNIFICANT CHANGE UP (ref 150–400)
POTASSIUM SERPL-MCNC: 4.2 MMOL/L — SIGNIFICANT CHANGE UP (ref 3.5–5.3)
POTASSIUM SERPL-MCNC: 4.6 MMOL/L — SIGNIFICANT CHANGE UP (ref 3.5–5.3)
POTASSIUM SERPL-SCNC: 4.2 MMOL/L — SIGNIFICANT CHANGE UP (ref 3.5–5.3)
POTASSIUM SERPL-SCNC: 4.6 MMOL/L — SIGNIFICANT CHANGE UP (ref 3.5–5.3)
PROT SERPL-MCNC: 7.2 GM/DL — SIGNIFICANT CHANGE UP (ref 6–8.3)
PROTHROM AB SERPL-ACNC: 13.3 SEC — HIGH (ref 10–12.9)
RBC # BLD: 4.75 M/UL — SIGNIFICANT CHANGE UP (ref 3.8–5.2)
RBC # FLD: 14.2 % — SIGNIFICANT CHANGE UP (ref 10.3–14.5)
SODIUM SERPL-SCNC: 140 MMOL/L — SIGNIFICANT CHANGE UP (ref 135–145)
SODIUM SERPL-SCNC: 141 MMOL/L — SIGNIFICANT CHANGE UP (ref 135–145)
TROPONIN I SERPL-MCNC: 0.2 NG/ML — HIGH (ref 0.01–0.04)
TROPONIN I SERPL-MCNC: 0.3 NG/ML — HIGH (ref 0.01–0.04)
WBC # BLD: 7.98 K/UL — SIGNIFICANT CHANGE UP (ref 3.8–10.5)
WBC # FLD AUTO: 7.98 K/UL — SIGNIFICANT CHANGE UP (ref 3.8–10.5)

## 2019-08-09 PROCEDURE — 93010 ELECTROCARDIOGRAM REPORT: CPT

## 2019-08-09 PROCEDURE — 99285 EMERGENCY DEPT VISIT HI MDM: CPT

## 2019-08-09 PROCEDURE — 71045 X-RAY EXAM CHEST 1 VIEW: CPT | Mod: 26

## 2019-08-09 PROCEDURE — 99223 1ST HOSP IP/OBS HIGH 75: CPT

## 2019-08-09 PROCEDURE — 73552 X-RAY EXAM OF FEMUR 2/>: CPT | Mod: 26,LT

## 2019-08-09 PROCEDURE — 73502 X-RAY EXAM HIP UNI 2-3 VIEWS: CPT | Mod: 26,LT

## 2019-08-09 PROCEDURE — 73700 CT LOWER EXTREMITY W/O DYE: CPT | Mod: 26,LT

## 2019-08-09 RX ORDER — HEPARIN SODIUM 5000 [USP'U]/ML
2000 INJECTION INTRAVENOUS; SUBCUTANEOUS EVERY 6 HOURS
Refills: 0 | Status: DISCONTINUED | OUTPATIENT
Start: 2019-08-09 | End: 2019-08-11

## 2019-08-09 RX ORDER — DEXTROSE 50 % IN WATER 50 %
25 SYRINGE (ML) INTRAVENOUS ONCE
Refills: 0 | Status: DISCONTINUED | OUTPATIENT
Start: 2019-08-09 | End: 2019-08-11

## 2019-08-09 RX ORDER — SODIUM CHLORIDE 9 MG/ML
1000 INJECTION, SOLUTION INTRAVENOUS
Refills: 0 | Status: DISCONTINUED | OUTPATIENT
Start: 2019-08-09 | End: 2019-08-11

## 2019-08-09 RX ORDER — DEXTROSE 50 % IN WATER 50 %
12.5 SYRINGE (ML) INTRAVENOUS ONCE
Refills: 0 | Status: DISCONTINUED | OUTPATIENT
Start: 2019-08-09 | End: 2019-08-11

## 2019-08-09 RX ORDER — ASPIRIN/CALCIUM CARB/MAGNESIUM 324 MG
81 TABLET ORAL DAILY
Refills: 0 | Status: DISCONTINUED | OUTPATIENT
Start: 2019-08-09 | End: 2019-08-10

## 2019-08-09 RX ORDER — LOSARTAN POTASSIUM 100 MG/1
25 TABLET, FILM COATED ORAL DAILY
Refills: 0 | Status: DISCONTINUED | OUTPATIENT
Start: 2019-08-09 | End: 2019-08-11

## 2019-08-09 RX ORDER — ATORVASTATIN CALCIUM 80 MG/1
40 TABLET, FILM COATED ORAL AT BEDTIME
Refills: 0 | Status: DISCONTINUED | OUTPATIENT
Start: 2019-08-09 | End: 2019-08-10

## 2019-08-09 RX ORDER — HEPARIN SODIUM 5000 [USP'U]/ML
INJECTION INTRAVENOUS; SUBCUTANEOUS
Qty: 25000 | Refills: 0 | Status: DISCONTINUED | OUTPATIENT
Start: 2019-08-09 | End: 2019-08-11

## 2019-08-09 RX ORDER — GLUCAGON INJECTION, SOLUTION 0.5 MG/.1ML
1 INJECTION, SOLUTION SUBCUTANEOUS ONCE
Refills: 0 | Status: DISCONTINUED | OUTPATIENT
Start: 2019-08-09 | End: 2019-08-11

## 2019-08-09 RX ORDER — DEXTROSE 50 % IN WATER 50 %
15 SYRINGE (ML) INTRAVENOUS ONCE
Refills: 0 | Status: DISCONTINUED | OUTPATIENT
Start: 2019-08-09 | End: 2019-08-11

## 2019-08-09 RX ORDER — HEPARIN SODIUM 5000 [USP'U]/ML
4500 INJECTION INTRAVENOUS; SUBCUTANEOUS EVERY 6 HOURS
Refills: 0 | Status: DISCONTINUED | OUTPATIENT
Start: 2019-08-09 | End: 2019-08-11

## 2019-08-09 RX ORDER — HEPARIN SODIUM 5000 [USP'U]/ML
4500 INJECTION INTRAVENOUS; SUBCUTANEOUS ONCE
Refills: 0 | Status: COMPLETED | OUTPATIENT
Start: 2019-08-09 | End: 2019-08-09

## 2019-08-09 RX ORDER — INSULIN LISPRO 100/ML
VIAL (ML) SUBCUTANEOUS
Refills: 0 | Status: DISCONTINUED | OUTPATIENT
Start: 2019-08-09 | End: 2019-08-11

## 2019-08-09 RX ORDER — LABETALOL HCL 100 MG
10 TABLET ORAL ONCE
Refills: 0 | Status: COMPLETED | OUTPATIENT
Start: 2019-08-09 | End: 2019-08-09

## 2019-08-09 RX ADMIN — ATORVASTATIN CALCIUM 40 MILLIGRAM(S): 80 TABLET, FILM COATED ORAL at 22:45

## 2019-08-09 RX ADMIN — HEPARIN SODIUM 4500 UNIT(S): 5000 INJECTION INTRAVENOUS; SUBCUTANEOUS at 20:44

## 2019-08-09 RX ADMIN — Medication 10 MILLIGRAM(S): at 22:48

## 2019-08-09 RX ADMIN — HEPARIN SODIUM 1100 UNIT(S)/HR: 5000 INJECTION INTRAVENOUS; SUBCUTANEOUS at 20:44

## 2019-08-09 NOTE — PROGRESS NOTE ADULT - ASSESSMENT
89 F with outpatient imaging report of left femoral neck fx    -Patient has unremarkable physical exam, no pain on passive/active ROM, log roll, and axial load testing  -XR and CT inconclusive for acute fracture  -Low suspicion of femoral neck fracture on XR and CT scan  -Will FU with MRI of left hip to rule out fracture  -Pain control  -DVT ppx: Heparin gtt per medicine  -NWB LLE  -Possible surgical management warranted pending MRI results  -Medical management per primary team  -Will discuss with attending, Dr. Rasheed, and will advise if plan changes 89 F with outpatient imaging report of left femoral neck fx    -Patient has unremarkable physical exam, no pain on passive/active ROM, log roll, and axial load testing  -XR and CT inconclusive for acute fracture  -Clinical exam does not correlate with hip fracture  -Will FU with MRI of left hip to rule out fracture  -Pain control  -DVT ppx: Heparin gtt per medicine  -NWB LLE  -Possible surgical management warranted pending MRI results  -NPO after midnight  -Medical management per primary team  -Will discuss with attending, Dr. Rasheed, and will advise if plan changes

## 2019-08-09 NOTE — PROGRESS NOTE ADULT - SUBJECTIVE AND OBJECTIVE BOX
89 F presents to [facility] with outpatient imaging suggesting left femoral neck fracture. History and exam limited to patient's mental status. AAOx1 to person only. Patient does not follow simple commands well. Unable to contact family at this time. Patient denies pain, trauma/fall.     PAST MEDICAL & SURGICAL HISTORY:  Hyperlipidemia  DM (diabetes mellitus)  HTN (hypertension)  No significant past surgical history    MEDICATIONS  (STANDING):  aspirin enteric coated 81 milliGRAM(s) Oral daily  atorvastatin 40 milliGRAM(s) Oral at bedtime  dextrose 5%. 1000 milliLiter(s) (50 mL/Hr) IV Continuous <Continuous>  dextrose 50% Injectable 12.5 Gram(s) IV Push once  dextrose 50% Injectable 25 Gram(s) IV Push once  dextrose 50% Injectable 25 Gram(s) IV Push once  heparin  Infusion.  Unit(s)/Hr (11 mL/Hr) IV Continuous <Continuous>  insulin lispro (HumaLOG) corrective regimen sliding scale   SubCutaneous three times a day before meals  losartan 25 milliGRAM(s) Oral daily    MEDICATIONS  (PRN):  dextrose 40% Gel 15 Gram(s) Oral once PRN Blood Glucose LESS THAN 70 milliGRAM(s)/deciliter  glucagon  Injectable 1 milliGRAM(s) IntraMuscular once PRN Glucose LESS THAN 70 milligrams/deciliter  heparin  Injectable 4500 Unit(s) IV Push every 6 hours PRN For aPTT less than 40  heparin  Injectable 2000 Unit(s) IV Push every 6 hours PRN For aPTT between 40 - 57    Allergies    No Known Allergies    Intolerances    LABS:                        13.5   7.98  )-----------( 200      ( 09 Aug 2019 17:55 )             43.5     08-09    141  |  107  |  21  ----------------------------<  210<H>  4.6   |  29  |  0.75    Ca    8.7      09 Aug 2019 20:17    TPro  7.2  /  Alb  3.0<L>  /  TBili  0.5  /  DBili  x   /  AST  21  /  ALT  33  /  AlkPhos  80  08-09    PT/INR - ( 09 Aug 2019 17:55 )   PT: 13.3 sec;   INR: 1.18 ratio         PTT - ( 09 Aug 2019 17:55 )  PTT:22.6 sec      PHYSICAL EXAM: Limited due to patient's mental status  GEN: NAD, AAOx1 to person only    LLE: Skin intact, no erythema, ecchymosis, edema, gross deformity, painless active/passive ROM of hip/knee/ankle/foot, unable to assess SLR due to patient's inability to follow commands, NTTP over bony prominences of the hip/knee/ankle/foot, L3-S1 SILT, motor grossly intact throughout hip flexion/quads/hams/TA/EHL/FHL/GSC, + DP/PT pulses, no pain with log roll, no pain with axial loading, compartments soft and compressible, calf nontender    SECONDARY SURVEY:   SPINE: Skin intact, no bony tenderness or step-offs appreciated throughout cervical/thoracic/lumbar/sacral spine    BLUE: Skin intact, no erythema, ecchymosis, edema, gross deformity, NTTP over the bony prominences of the shoulder/elbow/wrist/hand, painless passive/active ROM of the shoulder/elbow/wrist/hand, C5-T1 SILT, motor grossly intact throughout axillary/musculocutaenous/radial/median/ulnar nerves, + radial pulse    RLE: Skin intact, no erythema, ecchymosis, edema, gross deformity, NTTP over the bony prominences of the hip/knee/ankle/foot, painless passive/active ROM of the hip/knee/ankle/foot, +SLR, L2-S1 SILT, motor grossly intact throughout hip flexors/quads/hams/TA/EHL/FHL/GSC, + DP/PT pulses, no pain with log roll, no pain on axial loading, compartments soft and compressible, calf nontender      Imaging:   XR Pelvis/Hip: Inconclusive evidence of acute fracture. No evidence of dislocation.  CT Left hip: Inconclusive evidence of acute fracture.  MRI Left hip: Ordered

## 2019-08-09 NOTE — ED ADULT NURSE NOTE - ED STAT RN HANDOFF DETAILS
report taken from MEREDITH posada. lab work pending. report taken from MEREDITH posada. lab work pending. will continue to monitor.

## 2019-08-09 NOTE — ED PROVIDER NOTE - CARE PLAN
Principal Discharge DX:	Closed fracture of left hip, initial encounter  Secondary Diagnosis:	Atrial fibrillation

## 2019-08-09 NOTE — H&P ADULT - NSHPPHYSICALEXAM_GEN_ALL_CORE
GENERAL: NAD, well-groomed, well-developed  HEAD:  Atraumatic, Normocephalic  EYES: EOMI, PERRLA, conjunctiva and sclera clear  ENMT: No tonsillar erythema, exudates, or enlargement; Moist mucous membranes, Good dentition, No lesions  NECK: Supple, No JVD, Normal thyroid  NERVOUS SYSTEM:  Alert & Oriented X 1, non focal.  CHEST/LUNG: Clear to percussion bilaterally; No rales, rhonchi, wheezing, or rubs  HEART: Regular rate and rhythm; No murmurs, rubs, or gallops  ABDOMEN: Soft, Nontender, Nondistended; Bowel sounds present  EXTREMITIES:  2+ Peripheral Pulses, No clubbing, cyanosis, or edema  LYMPH: No lymphadenopathy noted  SKIN: No rashes or lesions

## 2019-08-09 NOTE — H&P ADULT - ASSESSMENT
89 year old female was brought to the ED because of a left hip fracture and new onset Afib. She fell 5 days ago and had outpatient testing and was at a cardiologist today and was noted to be in afib. No fever, no chills, no abd pain, no back pain, no nausea, no vomiting, no weakness, no headache, no neck pain, no weakness.  Found to have left femoral neck fracture.  She also has T wave inversion and elevated trop.      New onset A fib:  - Rate controlled  - Start heparin drip  - Follow up 2D echo  - Tele monitor  - Cardio consult      NSTEMI:  - T wave inversion in EKG, trop 0.2  - Trend trop  - Start ASA, continue statin  - On heparin drip  - 2D echo, cardio consult      Left femoral neck fracture:  - Awaiting CT   - Ortho eval  - Pain management      HTN:  - Continue current med      DM:  - Insulin SS  - Check Hb A1c.      HLD:  - Continue lipitor

## 2019-08-09 NOTE — ED ADULT NURSE NOTE - OBJECTIVE STATEMENT
AS PER AIDE, PT FELL ON SUNDAY AND SUSTAINED A FX HIP AND WAS STILL ABLE TO AMBULATE BUT WENT TO SEE PMD AND EKG AND ECHOCARDIOGRAM WAS DONE AND NEW ONSET AFIB WAS SEEN AND PT ALSO SENT IN TO CONFIRM LEFT HIP FX. EXTERNAL ROTATION NOTED

## 2019-08-09 NOTE — H&P ADULT - HISTORY OF PRESENT ILLNESS
89 year old female was brought to the ED because of a left hip fracture and new onset Afib. She fell 5 days ago and had outpatient testing and was at a cardiologist today and was noted to be in afib. No fever, no chills, no abd pain, no back pain, no nausea, no vomiting, no weakness, no headache, no neck pain, no weakness.  Found to have left femoral neck fracture.  She also has T wave inversion and elevated trop.

## 2019-08-09 NOTE — H&P ADULT - NSHPLABSRESULTS_GEN_ALL_CORE
13.5   7.98  )-----------( 200      ( 09 Aug 2019 17:55 )             43.5     08-09    140  |  105  |  22  ----------------------------<  251<H>  4.2   |  28  |  0.66    Ca    8.5      09 Aug 2019 17:55    TPro  7.2  /  Alb  3.0<L>  /  TBili  0.5  /  DBili  x   /  AST  21  /  ALT  33  /  AlkPhos  80  08-09    PT/INR - ( 09 Aug 2019 17:55 )   PT: 13.3 sec;   INR: 1.18 ratio         PTT - ( 09 Aug 2019 17:55 )  PTT:22.6 sec

## 2019-08-09 NOTE — ED PROVIDER NOTE - EKG #1 DATE/TIME
“You can access the Follow[employer] Patient Portal, offered by [facility], by registering with the following website: [email or web address]”
09-Aug-2019 18:13

## 2019-08-09 NOTE — PROGRESS NOTE ADULT - ATTENDING COMMENTS
patient has a left femoral neck fracture, valgus impacted.  according to family, patient is an ambulator and was ambulating after the fall. Based on this history, perc pinning indicated.  r/b/a discussed with family. patient optimized by medicine, high risk.  Risks, benefits and alternatives discussed with the patient's family at length. Risks include bleeding, infection, malunion, nonunion, hardware failure, injury to nerves, vessels or tendons, pain, stiffness, limp, need for future surgery, loss of function, loss of limb, loss of life/DEATH --high risk.  We discussed the operative plan and post op expectations.  The patient's family had the opportunity to ask questions. All questions were answered. The patient signed consent of their own accord.

## 2019-08-09 NOTE — ED PROVIDER NOTE - OBJECTIVE STATEMENT
89 year old female was brought to the ED because of a left hip fracture and new onset Afib. She fell 5 days ago and had outpatient testing and was at a cardiologist today and was noted to be in afib. No fever, no chills, no abd pain, no back pain, no nausea, no vomiting, no weakness, no headache, no neck pain, no weakness.

## 2019-08-10 ENCOUNTER — TRANSCRIPTION ENCOUNTER (OUTPATIENT)
Age: 84
End: 2019-08-10

## 2019-08-10 PROBLEM — E78.5 HYPERLIPIDEMIA, UNSPECIFIED: Chronic | Status: ACTIVE | Noted: 2018-12-21

## 2019-08-10 LAB
ALBUMIN SERPL ELPH-MCNC: 3 G/DL — LOW (ref 3.3–5)
ALP SERPL-CCNC: 77 U/L — SIGNIFICANT CHANGE UP (ref 40–120)
ALT FLD-CCNC: 32 U/L — SIGNIFICANT CHANGE UP (ref 12–78)
ANION GAP SERPL CALC-SCNC: 5 MMOL/L — SIGNIFICANT CHANGE UP (ref 5–17)
APTT BLD: 95.9 SEC — HIGH (ref 28.5–37)
APTT BLD: 96.7 SEC — HIGH (ref 28.5–37)
APTT BLD: >200 SEC — CRITICAL HIGH (ref 27.5–36.3)
AST SERPL-CCNC: 21 U/L — SIGNIFICANT CHANGE UP (ref 15–37)
BASE EXCESS BLDA CALC-SCNC: 3.4 MMOL/L — HIGH (ref -2–2)
BILIRUB SERPL-MCNC: 0.9 MG/DL — SIGNIFICANT CHANGE UP (ref 0.2–1.2)
BLOOD GAS COMMENTS: SIGNIFICANT CHANGE UP
BLOOD GAS SOURCE: SIGNIFICANT CHANGE UP
BUN SERPL-MCNC: 17 MG/DL — SIGNIFICANT CHANGE UP (ref 7–23)
CALCIUM SERPL-MCNC: 8.6 MG/DL — SIGNIFICANT CHANGE UP (ref 8.5–10.1)
CHLORIDE SERPL-SCNC: 106 MMOL/L — SIGNIFICANT CHANGE UP (ref 96–108)
CO2 SERPL-SCNC: 29 MMOL/L — SIGNIFICANT CHANGE UP (ref 22–31)
CREAT SERPL-MCNC: 0.63 MG/DL — SIGNIFICANT CHANGE UP (ref 0.5–1.3)
GLUCOSE SERPL-MCNC: 162 MG/DL — HIGH (ref 70–99)
HBA1C BLD-MCNC: 7.1 % — HIGH (ref 4–5.6)
HCO3 BLDA-SCNC: 30 MMOL/L — HIGH (ref 21–29)
HCT VFR BLD CALC: 43.2 % — SIGNIFICANT CHANGE UP (ref 34.5–45)
HGB BLD-MCNC: 13.3 G/DL — SIGNIFICANT CHANGE UP (ref 11.5–15.5)
HOROWITZ INDEX BLDA+IHG-RTO: 50 — SIGNIFICANT CHANGE UP
INR BLD: 1.2 RATIO — HIGH (ref 0.88–1.16)
MCHC RBC-ENTMCNC: 28.1 PG — SIGNIFICANT CHANGE UP (ref 27–34)
MCHC RBC-ENTMCNC: 30.8 GM/DL — LOW (ref 32–36)
MCV RBC AUTO: 91.3 FL — SIGNIFICANT CHANGE UP (ref 80–100)
NRBC # BLD: 0 /100 WBCS — SIGNIFICANT CHANGE UP (ref 0–0)
PCO2 BLDA: 55 MMHG — HIGH (ref 32–46)
PH BLD: 7.35 — SIGNIFICANT CHANGE UP (ref 7.35–7.45)
PLATELET # BLD AUTO: 184 K/UL — SIGNIFICANT CHANGE UP (ref 150–400)
PO2 BLDA: 128 MMHG — HIGH (ref 74–108)
POTASSIUM SERPL-MCNC: 4.1 MMOL/L — SIGNIFICANT CHANGE UP (ref 3.5–5.3)
POTASSIUM SERPL-SCNC: 4.1 MMOL/L — SIGNIFICANT CHANGE UP (ref 3.5–5.3)
PROT SERPL-MCNC: 7.2 GM/DL — SIGNIFICANT CHANGE UP (ref 6–8.3)
PROTHROM AB SERPL-ACNC: 13.5 SEC — HIGH (ref 10–12.9)
RBC # BLD: 4.73 M/UL — SIGNIFICANT CHANGE UP (ref 3.8–5.2)
RBC # FLD: 14.1 % — SIGNIFICANT CHANGE UP (ref 10.3–14.5)
SAO2 % BLDA: 99 % — HIGH (ref 92–96)
SODIUM SERPL-SCNC: 140 MMOL/L — SIGNIFICANT CHANGE UP (ref 135–145)
TROPONIN I SERPL-MCNC: 0.17 NG/ML — HIGH (ref 0.01–0.04)
WBC # BLD: 8.14 K/UL — SIGNIFICANT CHANGE UP (ref 3.8–10.5)
WBC # FLD AUTO: 8.14 K/UL — SIGNIFICANT CHANGE UP (ref 3.8–10.5)

## 2019-08-10 PROCEDURE — 73721 MRI JNT OF LWR EXTRE W/O DYE: CPT | Mod: 26,LT

## 2019-08-10 PROCEDURE — 93970 EXTREMITY STUDY: CPT | Mod: 26

## 2019-08-10 PROCEDURE — 93306 TTE W/DOPPLER COMPLETE: CPT | Mod: 26

## 2019-08-10 PROCEDURE — 99233 SBSQ HOSP IP/OBS HIGH 50: CPT

## 2019-08-10 PROCEDURE — 99223 1ST HOSP IP/OBS HIGH 75: CPT

## 2019-08-10 RX ORDER — SODIUM CHLORIDE 9 MG/ML
1000 INJECTION, SOLUTION INTRAVENOUS
Refills: 0 | Status: DISCONTINUED | OUTPATIENT
Start: 2019-08-10 | End: 2019-08-11

## 2019-08-10 RX ORDER — IPRATROPIUM/ALBUTEROL SULFATE 18-103MCG
3 AEROSOL WITH ADAPTER (GRAM) INHALATION ONCE
Refills: 0 | Status: COMPLETED | OUTPATIENT
Start: 2019-08-10 | End: 2019-08-10

## 2019-08-10 RX ORDER — MORPHINE SULFATE 50 MG/1
1 CAPSULE, EXTENDED RELEASE ORAL ONCE
Refills: 0 | Status: DISCONTINUED | OUTPATIENT
Start: 2019-08-10 | End: 2019-08-10

## 2019-08-10 RX ORDER — ATORVASTATIN CALCIUM 80 MG/1
20 TABLET, FILM COATED ORAL AT BEDTIME
Refills: 0 | Status: DISCONTINUED | OUTPATIENT
Start: 2019-08-10 | End: 2019-08-11

## 2019-08-10 RX ORDER — IPRATROPIUM/ALBUTEROL SULFATE 18-103MCG
3 AEROSOL WITH ADAPTER (GRAM) INHALATION EVERY 6 HOURS
Refills: 0 | Status: DISCONTINUED | OUTPATIENT
Start: 2019-08-10 | End: 2019-08-11

## 2019-08-10 RX ORDER — ATORVASTATIN CALCIUM 80 MG/1
1 TABLET, FILM COATED ORAL
Qty: 0 | Refills: 0 | DISCHARGE

## 2019-08-10 RX ADMIN — Medication 3 MILLILITER(S): at 17:10

## 2019-08-10 RX ADMIN — Medication 3 MILLILITER(S): at 09:06

## 2019-08-10 RX ADMIN — ATORVASTATIN CALCIUM 20 MILLIGRAM(S): 80 TABLET, FILM COATED ORAL at 21:16

## 2019-08-10 RX ADMIN — MORPHINE SULFATE 1 MILLIGRAM(S): 50 CAPSULE, EXTENDED RELEASE ORAL at 09:17

## 2019-08-10 RX ADMIN — MORPHINE SULFATE 1 MILLIGRAM(S): 50 CAPSULE, EXTENDED RELEASE ORAL at 09:02

## 2019-08-10 RX ADMIN — HEPARIN SODIUM 900 UNIT(S)/HR: 5000 INJECTION INTRAVENOUS; SUBCUTANEOUS at 12:35

## 2019-08-10 RX ADMIN — HEPARIN SODIUM 0 UNIT(S)/HR: 5000 INJECTION INTRAVENOUS; SUBCUTANEOUS at 05:15

## 2019-08-10 RX ADMIN — LOSARTAN POTASSIUM 25 MILLIGRAM(S): 100 TABLET, FILM COATED ORAL at 05:23

## 2019-08-10 RX ADMIN — Medication 3 MILLILITER(S): at 11:20

## 2019-08-10 RX ADMIN — HEPARIN SODIUM 900 UNIT(S)/HR: 5000 INJECTION INTRAVENOUS; SUBCUTANEOUS at 19:32

## 2019-08-10 RX ADMIN — HEPARIN SODIUM 900 UNIT(S)/HR: 5000 INJECTION INTRAVENOUS; SUBCUTANEOUS at 06:20

## 2019-08-10 NOTE — PROGRESS NOTE ADULT - SUBJECTIVE AND OBJECTIVE BOX
Patient is a 89y old  Female who presents with a chief complaint of L femoral neck fracture (10 Aug 2019 17:34)      INTERVAL HPI/OVERNIGHT EVENTS:  Pt was seen and examined, no acute events.  Episode of agitation and SOB this am, pt was placed on BIPAP, improved after that. As per family she had similar episodes in prior hospitalization when she gets anxious and agitated.    MEDICATIONS  (STANDING):  ALBUTerol/ipratropium for Nebulization 3 milliLiter(s) Nebulizer every 6 hours  atorvastatin 40 milliGRAM(s) Oral at bedtime  dextrose 5%. 1000 milliLiter(s) (50 mL/Hr) IV Continuous <Continuous>  dextrose 50% Injectable 12.5 Gram(s) IV Push once  dextrose 50% Injectable 25 Gram(s) IV Push once  dextrose 50% Injectable 25 Gram(s) IV Push once  heparin  Infusion.  Unit(s)/Hr (11 mL/Hr) IV Continuous <Continuous>  insulin lispro (HumaLOG) corrective regimen sliding scale   SubCutaneous three times a day before meals  lactated ringers. 1000 milliLiter(s) (75 mL/Hr) IV Continuous <Continuous>  losartan 25 milliGRAM(s) Oral daily    MEDICATIONS  (PRN):  dextrose 40% Gel 15 Gram(s) Oral once PRN Blood Glucose LESS THAN 70 milliGRAM(s)/deciliter  glucagon  Injectable 1 milliGRAM(s) IntraMuscular once PRN Glucose LESS THAN 70 milligrams/deciliter  heparin  Injectable 4500 Unit(s) IV Push every 6 hours PRN For aPTT less than 40  heparin  Injectable 2000 Unit(s) IV Push every 6 hours PRN For aPTT between 40 - 57      Allergies  No Known Allergies      Vital Signs Last 24 Hrs  T(C): 36.8 (10 Aug 2019 17:07), Max: 40 (10 Aug 2019 05:16)  T(F): 98.3 (10 Aug 2019 17:07), Max: 104 (10 Aug 2019 05:16)  HR: 90 (10 Aug 2019 17:20) (60 - 108)  BP: 109/80 (10 Aug 2019 17:07) (90/59 - 188/82)  BP(mean): --  RR: 18 (10 Aug 2019 17:07) (18 - 21)  SpO2: 98% (10 Aug 2019 17:20) (95% - 100%)    PHYSICAL EXAM:  GENERAL: NAD  HEAD:  Atraumatic  EYES: PERRLA  NERVOUS SYSTEM:  Awake, alert, confused  CHEST/LUNG: Clear  HEART: RRR  ABDOMEN: Soft, non tender  EXTREMITIES:  no edema      LABS:                        13.3   8.14  )-----------( 184      ( 10 Aug 2019 04:20 )             43.2     08-10    140  |  106  |  17  ----------------------------<  162<H>  4.1   |  29  |  0.63    Ca    8.6      10 Aug 2019 04:20    TPro  7.2  /  Alb  3.0<L>  /  TBili  0.9  /  DBili  x   /  AST  21  /  ALT  32  /  AlkPhos  77  08-10    PT/INR - ( 10 Aug 2019 04:23 )   PT: 13.5 sec;   INR: 1.20 ratio         PTT - ( 10 Aug 2019 12:04 )  PTT:96.7 sec    CAPILLARY BLOOD GLUCOSE      POCT Blood Glucose.: 119 mg/dL (10 Aug 2019 16:55)  POCT Blood Glucose.: 144 mg/dL (10 Aug 2019 12:39)  POCT Blood Glucose.: 241 mg/dL (10 Aug 2019 07:43)  POCT Blood Glucose.: 214 mg/dL (09 Aug 2019 20:43)      RADIOLOGY & ADDITIONAL TESTS:    Imaging Personally Reviewed:  [ ] YES  [ ] NO    Consultant(s) Notes Reviewed:  [ ] YES  [ ] NO    Care Discussed with Consultants/Other Providers [ ] YES  [ ] NO

## 2019-08-10 NOTE — PROGRESS NOTE ADULT - ASSESSMENT
89 F with rule-out left femoral neck fx vs. collar osteophytes    -Patient has unremarkable physical exam, no pain on passive/active ROM, log roll, and axial load testing  -XR and CT inconclusive for acute fracture  -Clinical exam does not correlate with hip fracture  -Will FU with MRI of left hip to rule out fracture today  -Pain control  -DVT ppx: Heparin gtt per medicine  -NWB LLE  -Possible surgical management warranted pending MRI results  -NPO until after MRI results  -Medical management per primary team  -If MRI positive, please document medical/cardiac optimization for OR.  -Will discuss with attending, Dr. Rasheed, and will advise if plan changes

## 2019-08-10 NOTE — PROGRESS NOTE ADULT - ASSESSMENT
A/P: 89 y/o female with subcapital impacted femoral neck fracture.  -Pain control  -NPO after midnight  -IV fluids while NPO  -Hold Heparin drip 6 hours prior to surgery (at 0200 on 8/11)  -SCDs  -NWB LLE, bedrest  -Please document medical/cardiac optimization for OR.  -Discussed risks, benefits, and alternatives to surgery with patient's family and HCP at bedside, including but not limited to infection, damage to surrounding tissues, and death. Family expressed understanding and all questions were answered.  -Plan for OR 8/11 at 0800 with Dr. Rasheed for [facility] of L Mayelin Sawyer M.D.  PGY-2 Orthopaedic Surgery

## 2019-08-10 NOTE — CONSULT NOTE ADULT - SUBJECTIVE AND OBJECTIVE BOX
CHIEF COMPLAINT:  Patient is a 89y old  Female who presents with a chief complaint of fall, A fib (10 Aug 2019 07:31)      HPI:  89 year old female was brought to the ED because of a left hip fracture and new onset Afib. She fell 5 days ago and had outpatient testing and was at a cardiologist today and was noted to be in afib. No fever, no chills, no abd pain, no back pain, no nausea, no vomiting, no weakness, no headache, no neck pain, no weakness.  Found to have left femoral neck fracture.  She also has T wave inversion and elevated trop.   Confused.     ALLERGIES:  No Known Allergies    Home Medications:  losartan 25 mg oral tablet: 1 tab(s) orally once a day (25 Dec 2018 11:11)      PAST MEDICAL & SURGICAL HISTORY:  Hyperlipidemia  DM (diabetes mellitus)  HTN (hypertension)  No significant past surgical history      FAMILY HISTORY:  No pertinent family history in first degree relatives      SOCIAL HISTORY:  No tobacco reported.    REVIEW OF SYSTEMS:  Unable to assess b/c of confusion.    PHYSICAL EXAM:  Vital Signs:  Vital Signs Last 24 Hrs  T(C): 36.6 (10 Aug 2019 14:14), Max: 40 (10 Aug 2019 05:16)  T(F): 97.8 (10 Aug 2019 14:14), Max: 104 (10 Aug 2019 05:16)  HR: 104 (10 Aug 2019 14:14) (60 - 108)  BP: 125/86 (10 Aug 2019 14:14) (90/59 - 188/82)  RR: 19 (10 Aug 2019 14:14) (18 - 21)  SpO2: 97% (10 Aug 2019 14:14) (95% - 100%)  I&O's Summary      General:  Appears stated age, well-groomed, well-nourished, no distress  HEENT:  NC/AT, patent nares w/ pink mucosa, OP clear w/o lesions, conjunctivae clear, no thyromegaly, nodules, adenopathy, no JVD  Chest:  Full & symmetric excursion, no increased effort, breath sounds clear  Cardiovascular:  Regular rhythm, S1, S2, no murmur  Abdomen:  Soft, non-tender, non-distended, normoactive bowel sound  Extremities:   Skin:  warm/dry  Musculoskeletal:  Full ROM in all joints w/o swelling/tenderness/effusion    LABORATORY:                          13.3   8.14  )-----------( 184      ( 10 Aug 2019 04:20 )             43.2     08-10    140  |  106  |  17  ----------------------------<  162<H>  4.1   |  29  |  0.63    Ca    8.6      10 Aug 2019 04:20    TPro  7.2  /  Alb  3.0<L>  /  TBili  0.9  /  DBili  x   /  AST  21  /  ALT  32  /  AlkPhos  77  08-10    ABG - ( 10 Aug 2019 09:57 )  pH, Arterial: x     pH, Blood: 7.35  /  pCO2: 55    /  pO2: 128   / HCO3: 30    / Base Excess: 3.4   /  SaO2: 99                CARDIAC MARKERS ( 10 Aug 2019 11:32 )  .175 ng/mL / x     / x     / x     / x      CARDIAC MARKERS ( 09 Aug 2019 22:46 )  .298 ng/mL / x     / x     / x     / x      CARDIAC MARKERS ( 09 Aug 2019 17:55 )  .202 ng/mL / x     / x     / x     / x          CAPILLARY BLOOD GLUCOSE  POCT Blood Glucose.: 144 mg/dL (10 Aug 2019 12:39)  POCT Blood Glucose.: 241 mg/dL (10 Aug 2019 07:43)  POCT Blood Glucose.: 214 mg/dL (09 Aug 2019 20:43)    LIVER FUNCTIONS - ( 10 Aug 2019 04:20 )  Alb: 3.0 g/dL / Pro: 7.2 gm/dL / ALK PHOS: 77 U/L / ALT: 32 U/L / AST: 21 U/L / GGT: x           PT/INR - ( 10 Aug 2019 04:23 )   PT: 13.5 sec;   INR: 1.20 ratio         PTT - ( 10 Aug 2019 12:04 )  PTT:96.7 sec      IMAGING:  < from: CT Hip No Cont, Left (08.09.19 @ 19:31) >  IMPRESSION:   1. displaced impacted fracture of the subcapital region of the left hip.   2. No dislocation.    < end of copied text >    < from: US Duplex Venous Lower Ext Complete, Bilateral (08.10.19 @ 12:27) >  No evidence of deep venous thrombosis in either lower extremity.    < end of copied text >      < from: TTE Echo Doppler w/o Cont (12.21.18 @ 18:07) >  Summary:   1. Left ventricular ejection fraction, by visual estimation, is 60 to   65%.   2. Technically difficult study.   3. Normal global left ventricular systolic function.   4. Normal left ventricular internal cavity size.   5. Spectral Doppler shows pseudonormal pattern of left ventricular   myocardial filling (Grade II diastolic dysfunction).   6. There is mild concentric left ventricular hypertrophy.   7. Normal right ventricular size and function.   8. There is no evidence of pericardial effusion.   9. Moderate mitral annular calcification.  10. Moderate mitral valve regurgitation.  11. Thickening and calcification of the anterior and posterior mitral   valve leaflets.  12. Degenerative tricuspid valve.  13. Moderate tricuspid regurgitation.  14. Mild aortic regurgitation.  15. Mild to moderate aortic valve stenosis.  16. Pulmonic valve regurgitation.  17. Estimated pulmonary artery systolic pressure is 62.7 mmHg assuming a   right atrial pressure of 5 mmHg, which is consistent with severe   pulmonary hypertension.  18. Mitral valve mean gradient is 2.0 mmHg.  19. The aortic valve mean gradient is 5.6 mmHg consistent with normally   opening aortic valve.  20. There is mild aortic root calcification.    < end of copied text >    ASSESSMENT:  89 year old female was brought to the ED because of a left hip fracture and new onset Afib. She fell 5 days ago and had outpatient testing and was at a cardiologist today and was noted to be in afib. No fever, no chills, no abd pain, no back pain, no nausea, no vomiting, no weakness, no headache, no neck pain, no weakness.  Found to have left femoral neck fracture.  She also has T wave inversion and elevated trop.   Confused.     PLAN:     MEDICATIONS  (STANDING):  ALBUTerol/ipratropium for Nebulization 3 milliLiter(s) Nebulizer every 6 hours  aspirin enteric coated 81 milliGRAM(s) Oral daily  atorvastatin 40 milliGRAM(s) Oral at bedtime    heparin  Infusion.  Unit(s)/Hr (11 mL/Hr) IV Continuous <Continuous>  insulin lispro (HumaLOG) corrective regimen sliding scale   SubCutaneous three times a day before meals  losartan 25 milliGRAM(s) Oral daily    Pt is at high risk for any procedures. Shared decision making with family whether or not to proceed with ortho repair needs to occur with full understanding of full risks including complications leading to further comorbidities or death.   Supportive care for now.    Tk Gillette MD, FACC, FASE, FASNC, FACP  Director, Heart Failure Services  [facility]  , Department of Cardiology  [facility] CHIEF COMPLAINT:  Patient is a 89y old  Female who presents with a chief complaint of fall, A fib (10 Aug 2019 07:31)      HPI:  89 year old female was brought to the ED because of a left hip fracture and new onset Afib. She fell 5 days ago and had outpatient testing and was at a cardiologist today and was noted to be in afib. No fever, no chills, no abd pain, no back pain, no nausea, no vomiting, no weakness, no headache, no neck pain, no weakness.  Found to have left femoral neck fracture.  She also has T wave inversion and elevated trop.   Confused. More awake and alert this PM. D/w family at bedside.    ALLERGIES:  No Known Allergies    Home Medications:  losartan 25 mg oral tablet: 1 tab(s) orally once a day (25 Dec 2018 11:11)      PAST MEDICAL & SURGICAL HISTORY:  Hyperlipidemia  DM (diabetes mellitus)  HTN (hypertension)  No significant past surgical history      FAMILY HISTORY:  No pertinent family history in first degree relatives      SOCIAL HISTORY:  No tobacco reported.    REVIEW OF SYSTEMS:  Unable to assess b/c of confusion.    PHYSICAL EXAM:  Vital Signs:  Vital Signs Last 24 Hrs  T(C): 36.6 (10 Aug 2019 14:14), Max: 40 (10 Aug 2019 05:16)  T(F): 97.8 (10 Aug 2019 14:14), Max: 104 (10 Aug 2019 05:16)  HR: 104 (10 Aug 2019 14:14) (60 - 108)  BP: 125/86 (10 Aug 2019 14:14) (90/59 - 188/82)  RR: 19 (10 Aug 2019 14:14) (18 - 21)  SpO2: 97% (10 Aug 2019 14:14) (95% - 100%)      General:  Appears stated age, well-groomed, well-nourished, no distress  HEENT:  NC/AT, patent nares w/ pink mucosa, OP clear w/o lesions, conjunctivae clear, no thyromegaly, nodules, adenopathy, no JVD  Chest:  Full & symmetric excursion, no increased effort, breath sounds clear  Cardiovascular:  Irregular rhythm, S1, S2, no murmur  Abdomen:  Soft, non-tender, non-distended, normoactive bowel sound  Extremities: trace edema  Skin:  warm/dry  Musculoskeletal:  Full ROM in all joints w/o swelling/tenderness/effusion    LABORATORY:                          13.3   8.14  )-----------( 184      ( 10 Aug 2019 04:20 )             43.2     08-10    140  |  106  |  17  ----------------------------<  162<H>  4.1   |  29  |  0.63    Ca    8.6      10 Aug 2019 04:20    TPro  7.2  /  Alb  3.0<L>  /  TBili  0.9  /  DBili  x   /  AST  21  /  ALT  32  /  AlkPhos  77  08-10    ABG - ( 10 Aug 2019 09:57 )  pH, Arterial: x     pH, Blood: 7.35  /  pCO2: 55    /  pO2: 128   / HCO3: 30    / Base Excess: 3.4   /  SaO2: 99                CARDIAC MARKERS ( 10 Aug 2019 11:32 )  .175 ng/mL / x     / x     / x     / x      CARDIAC MARKERS ( 09 Aug 2019 22:46 )  .298 ng/mL / x     / x     / x     / x      CARDIAC MARKERS ( 09 Aug 2019 17:55 )  .202 ng/mL / x     / x     / x     / x          CAPILLARY BLOOD GLUCOSE  POCT Blood Glucose.: 144 mg/dL (10 Aug 2019 12:39)  POCT Blood Glucose.: 241 mg/dL (10 Aug 2019 07:43)  POCT Blood Glucose.: 214 mg/dL (09 Aug 2019 20:43)    LIVER FUNCTIONS - ( 10 Aug 2019 04:20 )  Alb: 3.0 g/dL / Pro: 7.2 gm/dL / ALK PHOS: 77 U/L / ALT: 32 U/L / AST: 21 U/L / GGT: x           PT/INR - ( 10 Aug 2019 04:23 )   PT: 13.5 sec;   INR: 1.20 ratio         PTT - ( 10 Aug 2019 12:04 )  PTT:96.7 sec      IMAGING:  < from: CT Hip No Cont, Left (08.09.19 @ 19:31) >  IMPRESSION:   1. displaced impacted fracture of the subcapital region of the left hip.   2. No dislocation.    < end of copied text >    < from: US Duplex Venous Lower Ext Complete, Bilateral (08.10.19 @ 12:27) >  No evidence of deep venous thrombosis in either lower extremity.    < end of copied text >      < from: TTE Echo Doppler w/o Cont (12.21.18 @ 18:07) >  Summary:   1. Left ventricular ejection fraction, by visual estimation, is 60 to   65%.   2. Technically difficult study.   3. Normal global left ventricular systolic function.   4. Normal left ventricular internal cavity size.   5. Spectral Doppler shows pseudonormal pattern of left ventricular   myocardial filling (Grade II diastolic dysfunction).   6. There is mild concentric left ventricular hypertrophy.   7. Normal right ventricular size and function.   8. There is no evidence of pericardial effusion.   9. Moderate mitral annular calcification.  10. Moderate mitral valve regurgitation.  11. Thickening and calcification of the anterior and posterior mitral   valve leaflets.  12. Degenerative tricuspid valve.  13. Moderate tricuspid regurgitation.  14. Mild aortic regurgitation.  15. Mild to moderate aortic valve stenosis.  16. Pulmonic valve regurgitation.  17. Estimated pulmonary artery systolic pressure is 62.7 mmHg assuming a   right atrial pressure of 5 mmHg, which is consistent with severe   pulmonary hypertension.  18. Mitral valve mean gradient is 2.0 mmHg.  19. The aortic valve mean gradient is 5.6 mmHg consistent with normally   opening aortic valve.  20. There is mild aortic root calcification.    < end of copied text >    ASSESSMENT:  89 year old female was brought to the ED because of a left hip fracture and new onset Afib. She fell 5 days ago and had outpatient testing and was at a cardiologist today and was noted to be in afib. No fever, no chills, no abd pain, no back pain, no nausea, no vomiting, no weakness, no headache, no neck pain, no weakness.  Found to have left femoral neck fracture.  She also has T wave inversion and elevated trop.   Confused. More awake and alert this PM. D/w family at bedside.    PLAN:     MEDICATIONS  (STANDING):  ALBUTerol/ipratropium for Nebulization 3 milliLiter(s) Nebulizer every 6 hours  aspirin enteric coated 81 milliGRAM(s) Oral daily  atorvastatin 40 milliGRAM(s) Oral at bedtime    heparin  Infusion.  Unit(s)/Hr (11 mL/Hr) IV Continuous <Continuous>  insulin lispro (HumaLOG) corrective regimen sliding scale   SubCutaneous three times a day before meals  losartan 25 milliGRAM(s) Oral daily    Pt is at high risk for any procedures. Shared decision making with family whether or not to proceed with ortho repair needs to occur with full understanding of full risks including complications leading to further comorbidities or death.   After further discussions with family at bedside, they are willing to proceed with ortho repair. I d/w ortho team also.    From a current cardiac standpoint, she may proceed with elevated but acceptable risk for ortho repair.  Routine hemodynamic monitoring ciaran-op is recommended.  Hold Heparin gtt infusion 6 hours prior and 6 hour post op. Resume Heparin infusion for stroke risk reduction at surgical discretion.  D/w family.    Tk Gillette MD, FACC, OSCAR VICENTE, FACP  Director, Heart Failure Services  [facility]  , Department of Cardiology  [facility]

## 2019-08-10 NOTE — PROVIDER CONTACT NOTE (OTHER) - RECOMMENDATIONS
draw all labs at 0315 for clustered care approach and maximize therapeutic sleep therapy for pt wellness and healing; continue monitoring

## 2019-08-10 NOTE — PROGRESS NOTE ADULT - SUBJECTIVE AND OBJECTIVE BOX
Orthopaedic Surgery Pre-Operative Note    Dx: L femoral neck fracture    Sx: CRPP vs Hemiarthroplasty    Surgeon: Wili    Clearance: Meds/Cards cleared with increased risk    Consent: Son consented; consent in chart    H&P: Completed by medicine    CBC:                        13.3   8.14  )-----------( 184      ( 10 Aug 2019 04:20 )             43.2     BMP:  10 Aug 2019 04:20    140    |  106    |  17     ----------------------------<  162    4.1     |  29     |  0.63     Ca    8.6        10 Aug 2019 04:20    TPro  7.2    /  Alb  3.0    /  TBili  0.9    /  DBili  x      /  AST  21     /  ALT  32     /  AlkPhos  77     10 Aug 2019 04:20    PT/INR/PTT:  PT/INR - ( 10 Aug 2019 04:23 )   PT: 13.5 sec;   INR: 1.20 ratio         PTT - ( 10 Aug 2019 12:04 )  PTT:96.7 sec    T&S:  Type + Screen 08-09 @ 20:17  --  A POS  NEG  --  --  --      UA: ordered    EKG: NSTEMI    CXR: no acute cardiopulmonary changes

## 2019-08-10 NOTE — PROGRESS NOTE ADULT - SUBJECTIVE AND OBJECTIVE BOX
Orthopaedic Surgery Progress Note    Pt seen and examined at bedside. Pt with altered mental status, unable to follow commands.     Labs:                        13.3   8.14  )-----------( 184      ( 10 Aug 2019 04:20 )             43.2     08-10    140  |  106  |  17  ----------------------------<  162<H>  4.1   |  29  |  0.63    Ca    8.6      10 Aug 2019 04:20    TPro  7.2  /  Alb  3.0<L>  /  TBili  0.9  /  DBili  x   /  AST  21  /  ALT  32  /  AlkPhos  77  08-10    PT/INR - ( 10 Aug 2019 04:23 )   PT: 13.5 sec;   INR: 1.20 ratio         PTT - ( 10 Aug 2019 04:23 )  PTT:>200.0 sec    Vitals:  T(C): 40 (08-10-19 @ 05:16), Max: 40 (08-10-19 @ 05:16)  HR: 85 (08-10-19 @ 05:21) (85 - 104)  BP: 164/94 (08-10-19 @ 05:21) (150/70 - 188/82)  RR: 20 (08-10-19 @ 05:16) (18 - 20)  SpO2: 95% (08-10-19 @ 05:16) (95% - 98%)    Physical Exam:  Gen: NAD, resting comfortably    LLE:  Skin intact  Moving extremity and flexing at hip spontaneously  Does not appear to be tender  Non-tender with axial load or log roll  +EHL/FHL/TA/GS  SILT L2-S1  +DP/PT Pulses  Compartments soft and compressible  No calf TTP B/L

## 2019-08-10 NOTE — PROVIDER CONTACT NOTE (OTHER) - ACTION/TREATMENT ORDERED:
PA gave verbal phone consent to draw all labs at 0315; phlebotomy made aware; continue monitoring; notify of any changes

## 2019-08-10 NOTE — PROGRESS NOTE ADULT - ASSESSMENT
89 year old female was brought to the ED because of a left hip fracture and new onset Afib. She fell 5 days ago and had outpatient testing and was at a cardiologist today and was noted to be in afib. No fever, no chills, no abd pain, no back pain, no nausea, no vomiting, no weakness, no headache, no neck pain, no weakness. Found to have left femoral neck fracture. She also has T wave inversion and elevated trop.      New onset A fib:  - Rate controlled  - On heparin drip, hold 6 hrs pre op, restart once cleared by ortho.  - Tjor3nyea 4.  - Follow up 2D echo  - Tele monitor  - Cardio follow up      Demand ischemia:  - Continue ASA, statin  - 2D echo      Left femoral neck fracture:  - Possible OR tomorrow  - Pt remains high risk for procedure  - Pain management      HTN:  - Continue current med      DM:  - Insulin SS  - Check Hb A1c.      HLD:  - Continue Lipitor.    Acid base:  - SOB this am, now stable  - ABG with chronic compensated resp acidosis  - Pt doesn't have known COPD history  - Continue BIPAP PRN      DNR.

## 2019-08-11 ENCOUNTER — TRANSCRIPTION ENCOUNTER (OUTPATIENT)
Age: 84
End: 2019-08-11

## 2019-08-11 DIAGNOSIS — S72.002A FRACTURE OF UNSPECIFIED PART OF NECK OF LEFT FEMUR, INITIAL ENCOUNTER FOR CLOSED FRACTURE: ICD-10-CM

## 2019-08-11 DIAGNOSIS — I48.91 UNSPECIFIED ATRIAL FIBRILLATION: ICD-10-CM

## 2019-08-11 DIAGNOSIS — I95.9 HYPOTENSION, UNSPECIFIED: ICD-10-CM

## 2019-08-11 LAB
ALBUMIN SERPL ELPH-MCNC: 2.6 G/DL — LOW (ref 3.3–5)
ALP SERPL-CCNC: 66 U/L — SIGNIFICANT CHANGE UP (ref 40–120)
ALT FLD-CCNC: 36 U/L — SIGNIFICANT CHANGE UP (ref 12–78)
ANION GAP SERPL CALC-SCNC: 3 MMOL/L — LOW (ref 5–17)
ANION GAP SERPL CALC-SCNC: 6 MMOL/L — SIGNIFICANT CHANGE UP (ref 5–17)
APTT BLD: 30.4 SEC — SIGNIFICANT CHANGE UP (ref 27.5–36.3)
APTT BLD: 31.3 SEC — SIGNIFICANT CHANGE UP (ref 28.5–37)
AST SERPL-CCNC: 24 U/L — SIGNIFICANT CHANGE UP (ref 15–37)
BASE EXCESS BLDA CALC-SCNC: 5.6 MMOL/L — HIGH (ref -2–2)
BILIRUB SERPL-MCNC: 0.6 MG/DL — SIGNIFICANT CHANGE UP (ref 0.2–1.2)
BLOOD GAS COMMENTS: SIGNIFICANT CHANGE UP
BLOOD GAS COMMENTS: SIGNIFICANT CHANGE UP
BLOOD GAS SOURCE: SIGNIFICANT CHANGE UP
BUN SERPL-MCNC: 18 MG/DL — SIGNIFICANT CHANGE UP (ref 7–23)
BUN SERPL-MCNC: 21 MG/DL — SIGNIFICANT CHANGE UP (ref 7–23)
CALCIUM SERPL-MCNC: 8.6 MG/DL — SIGNIFICANT CHANGE UP (ref 8.5–10.1)
CALCIUM SERPL-MCNC: 9.3 MG/DL — SIGNIFICANT CHANGE UP (ref 8.5–10.1)
CHLORIDE SERPL-SCNC: 106 MMOL/L — SIGNIFICANT CHANGE UP (ref 96–108)
CHLORIDE SERPL-SCNC: 107 MMOL/L — SIGNIFICANT CHANGE UP (ref 96–108)
CO2 SERPL-SCNC: 30 MMOL/L — SIGNIFICANT CHANGE UP (ref 22–31)
CO2 SERPL-SCNC: 33 MMOL/L — HIGH (ref 22–31)
CREAT SERPL-MCNC: 0.59 MG/DL — SIGNIFICANT CHANGE UP (ref 0.5–1.3)
CREAT SERPL-MCNC: 0.62 MG/DL — SIGNIFICANT CHANGE UP (ref 0.5–1.3)
GLUCOSE BLDC GLUCOMTR-MCNC: 150 MG/DL — HIGH (ref 70–99)
GLUCOSE BLDC GLUCOMTR-MCNC: 259 MG/DL — HIGH (ref 70–99)
GLUCOSE BLDC GLUCOMTR-MCNC: 273 MG/DL — HIGH (ref 70–99)
GLUCOSE SERPL-MCNC: 118 MG/DL — HIGH (ref 70–99)
GLUCOSE SERPL-MCNC: 164 MG/DL — HIGH (ref 70–99)
HCO3 BLDA-SCNC: 31 MMOL/L — HIGH (ref 21–29)
HCT VFR BLD CALC: 41.2 % — SIGNIFICANT CHANGE UP (ref 34.5–45)
HCT VFR BLD CALC: 41.5 % — SIGNIFICANT CHANGE UP (ref 34.5–45)
HGB BLD-MCNC: 12.7 G/DL — SIGNIFICANT CHANGE UP (ref 11.5–15.5)
HGB BLD-MCNC: 12.7 G/DL — SIGNIFICANT CHANGE UP (ref 11.5–15.5)
HOROWITZ INDEX BLDA+IHG-RTO: 32 — SIGNIFICANT CHANGE UP
INR BLD: 1.11 RATIO — SIGNIFICANT CHANGE UP (ref 0.88–1.16)
MAGNESIUM SERPL-MCNC: 2 MG/DL — SIGNIFICANT CHANGE UP (ref 1.6–2.6)
MCHC RBC-ENTMCNC: 28.7 PG — SIGNIFICANT CHANGE UP (ref 27–34)
MCHC RBC-ENTMCNC: 28.9 PG — SIGNIFICANT CHANGE UP (ref 27–34)
MCHC RBC-ENTMCNC: 30.6 GM/DL — LOW (ref 32–36)
MCHC RBC-ENTMCNC: 30.8 GM/DL — LOW (ref 32–36)
MCV RBC AUTO: 93.6 FL — SIGNIFICANT CHANGE UP (ref 80–100)
MCV RBC AUTO: 93.7 FL — SIGNIFICANT CHANGE UP (ref 80–100)
NRBC # BLD: 0 /100 WBCS — SIGNIFICANT CHANGE UP (ref 0–0)
NRBC # BLD: 0 /100 WBCS — SIGNIFICANT CHANGE UP (ref 0–0)
PCO2 BLDA: 53 MMHG — HIGH (ref 32–46)
PH BLD: 7.39 — SIGNIFICANT CHANGE UP (ref 7.35–7.45)
PHOSPHATE SERPL-MCNC: 4 MG/DL — SIGNIFICANT CHANGE UP (ref 2.5–4.5)
PLATELET # BLD AUTO: 205 K/UL — SIGNIFICANT CHANGE UP (ref 150–400)
PLATELET # BLD AUTO: 206 K/UL — SIGNIFICANT CHANGE UP (ref 150–400)
PO2 BLDA: 68 MMHG — LOW (ref 74–108)
POTASSIUM SERPL-MCNC: 4.1 MMOL/L — SIGNIFICANT CHANGE UP (ref 3.5–5.3)
POTASSIUM SERPL-MCNC: 4.8 MMOL/L — SIGNIFICANT CHANGE UP (ref 3.5–5.3)
POTASSIUM SERPL-SCNC: 4.1 MMOL/L — SIGNIFICANT CHANGE UP (ref 3.5–5.3)
POTASSIUM SERPL-SCNC: 4.8 MMOL/L — SIGNIFICANT CHANGE UP (ref 3.5–5.3)
PROT SERPL-MCNC: 6.2 GM/DL — SIGNIFICANT CHANGE UP (ref 6–8.3)
PROTHROM AB SERPL-ACNC: 12.5 SEC — SIGNIFICANT CHANGE UP (ref 10–12.9)
RBC # BLD: 4.4 M/UL — SIGNIFICANT CHANGE UP (ref 3.8–5.2)
RBC # BLD: 4.43 M/UL — SIGNIFICANT CHANGE UP (ref 3.8–5.2)
RBC # FLD: 14.2 % — SIGNIFICANT CHANGE UP (ref 10.3–14.5)
RBC # FLD: 14.4 % — SIGNIFICANT CHANGE UP (ref 10.3–14.5)
SAO2 % BLDA: 93 % — SIGNIFICANT CHANGE UP (ref 92–96)
SODIUM SERPL-SCNC: 142 MMOL/L — SIGNIFICANT CHANGE UP (ref 135–145)
SODIUM SERPL-SCNC: 143 MMOL/L — SIGNIFICANT CHANGE UP (ref 135–145)
WBC # BLD: 7.63 K/UL — SIGNIFICANT CHANGE UP (ref 3.8–10.5)
WBC # BLD: 9.52 K/UL — SIGNIFICANT CHANGE UP (ref 3.8–10.5)
WBC # FLD AUTO: 7.63 K/UL — SIGNIFICANT CHANGE UP (ref 3.8–10.5)
WBC # FLD AUTO: 9.52 K/UL — SIGNIFICANT CHANGE UP (ref 3.8–10.5)

## 2019-08-11 PROCEDURE — 99233 SBSQ HOSP IP/OBS HIGH 50: CPT

## 2019-08-11 RX ORDER — DEXTROSE 50 % IN WATER 50 %
12.5 SYRINGE (ML) INTRAVENOUS ONCE
Refills: 0 | Status: DISCONTINUED | OUTPATIENT
Start: 2019-08-11 | End: 2019-08-15

## 2019-08-11 RX ORDER — ATORVASTATIN CALCIUM 80 MG/1
20 TABLET, FILM COATED ORAL AT BEDTIME
Refills: 0 | Status: DISCONTINUED | OUTPATIENT
Start: 2019-08-11 | End: 2019-08-15

## 2019-08-11 RX ORDER — LOSARTAN POTASSIUM 100 MG/1
25 TABLET, FILM COATED ORAL DAILY
Refills: 0 | Status: DISCONTINUED | OUTPATIENT
Start: 2019-08-11 | End: 2019-08-14

## 2019-08-11 RX ORDER — DEXTROSE 50 % IN WATER 50 %
15 SYRINGE (ML) INTRAVENOUS ONCE
Refills: 0 | Status: DISCONTINUED | OUTPATIENT
Start: 2019-08-11 | End: 2019-08-15

## 2019-08-11 RX ORDER — ATORVASTATIN CALCIUM 80 MG/1
40 TABLET, FILM COATED ORAL AT BEDTIME
Refills: 0 | Status: DISCONTINUED | OUTPATIENT
Start: 2019-08-11 | End: 2019-08-11

## 2019-08-11 RX ORDER — HEPARIN SODIUM 5000 [USP'U]/ML
INJECTION INTRAVENOUS; SUBCUTANEOUS
Qty: 25000 | Refills: 0 | Status: DISCONTINUED | OUTPATIENT
Start: 2019-08-11 | End: 2019-08-12

## 2019-08-11 RX ORDER — DEXTROSE 50 % IN WATER 50 %
25 SYRINGE (ML) INTRAVENOUS ONCE
Refills: 0 | Status: DISCONTINUED | OUTPATIENT
Start: 2019-08-11 | End: 2019-08-15

## 2019-08-11 RX ORDER — INSULIN LISPRO 100/ML
VIAL (ML) SUBCUTANEOUS AT BEDTIME
Refills: 0 | Status: DISCONTINUED | OUTPATIENT
Start: 2019-08-11 | End: 2019-08-15

## 2019-08-11 RX ORDER — INSULIN LISPRO 100/ML
VIAL (ML) SUBCUTANEOUS
Refills: 0 | Status: DISCONTINUED | OUTPATIENT
Start: 2019-08-11 | End: 2019-08-15

## 2019-08-11 RX ORDER — ASPIRIN/CALCIUM CARB/MAGNESIUM 324 MG
81 TABLET ORAL DAILY
Refills: 0 | Status: DISCONTINUED | OUTPATIENT
Start: 2019-08-12 | End: 2019-08-14

## 2019-08-11 RX ORDER — GLUCAGON INJECTION, SOLUTION 0.5 MG/.1ML
1 INJECTION, SOLUTION SUBCUTANEOUS ONCE
Refills: 0 | Status: DISCONTINUED | OUTPATIENT
Start: 2019-08-11 | End: 2019-08-15

## 2019-08-11 RX ORDER — HEPARIN SODIUM 5000 [USP'U]/ML
4500 INJECTION INTRAVENOUS; SUBCUTANEOUS EVERY 6 HOURS
Refills: 0 | Status: DISCONTINUED | OUTPATIENT
Start: 2019-08-11 | End: 2019-08-12

## 2019-08-11 RX ORDER — SODIUM CHLORIDE 9 MG/ML
1000 INJECTION, SOLUTION INTRAVENOUS
Refills: 0 | Status: DISCONTINUED | OUTPATIENT
Start: 2019-08-11 | End: 2019-08-15

## 2019-08-11 RX ORDER — IPRATROPIUM/ALBUTEROL SULFATE 18-103MCG
3 AEROSOL WITH ADAPTER (GRAM) INHALATION EVERY 6 HOURS
Refills: 0 | Status: DISCONTINUED | OUTPATIENT
Start: 2019-08-11 | End: 2019-08-12

## 2019-08-11 RX ORDER — ACETAMINOPHEN 500 MG
650 TABLET ORAL EVERY 6 HOURS
Refills: 0 | Status: DISCONTINUED | OUTPATIENT
Start: 2019-08-11 | End: 2019-08-15

## 2019-08-11 RX ORDER — HEPARIN SODIUM 5000 [USP'U]/ML
2000 INJECTION INTRAVENOUS; SUBCUTANEOUS EVERY 6 HOURS
Refills: 0 | Status: DISCONTINUED | OUTPATIENT
Start: 2019-08-11 | End: 2019-08-12

## 2019-08-11 RX ADMIN — Medication 650 MILLIGRAM(S): at 16:05

## 2019-08-11 RX ADMIN — Medication 3 MILLILITER(S): at 17:06

## 2019-08-11 RX ADMIN — LOSARTAN POTASSIUM 25 MILLIGRAM(S): 100 TABLET, FILM COATED ORAL at 13:41

## 2019-08-11 RX ADMIN — Medication 3 MILLILITER(S): at 11:38

## 2019-08-11 RX ADMIN — Medication 3 MILLILITER(S): at 23:15

## 2019-08-11 RX ADMIN — Medication 3 MILLILITER(S): at 00:00

## 2019-08-11 RX ADMIN — ATORVASTATIN CALCIUM 20 MILLIGRAM(S): 80 TABLET, FILM COATED ORAL at 21:04

## 2019-08-11 RX ADMIN — Medication 1: at 21:04

## 2019-08-11 RX ADMIN — SODIUM CHLORIDE 75 MILLILITER(S): 9 INJECTION, SOLUTION INTRAVENOUS at 02:07

## 2019-08-11 RX ADMIN — HEPARIN SODIUM 1100 UNIT(S)/HR: 5000 INJECTION INTRAVENOUS; SUBCUTANEOUS at 18:16

## 2019-08-11 RX ADMIN — Medication 3: at 15:57

## 2019-08-11 RX ADMIN — Medication 650 MILLIGRAM(S): at 16:35

## 2019-08-11 RX ADMIN — Medication 3 MILLILITER(S): at 05:44

## 2019-08-11 NOTE — PROGRESS NOTE ADULT - SUBJECTIVE AND OBJECTIVE BOX
Orthopaedic Surgery Progress Note    Pt seen and examined at bedside. Pt with altered mental status, unable to follow commands.     Labs:                              12.7   7.63  )-----------( 205      ( 11 Aug 2019 06:32 )             41.2   08-10    140  |  106  |  17  ----------------------------<  162<H>  4.1   |  29  |  0.63    Ca    8.6      10 Aug 2019 04:20    TPro  7.2  /  Alb  3.0<L>  /  TBili  0.9  /  DBili  x   /  AST  21  /  ALT  32  /  AlkPhos  77  08-10  PT/INR - ( 11 Aug 2019 06:32 )   PT: 12.5 sec;   INR: 1.11 ratio         PTT - ( 11 Aug 2019 06:32 )  PTT:31.3 sec      Vitals:  Vital Signs Last 24 Hrs  T(C): 36.8 (11 Aug 2019 04:38), Max: 36.8 (10 Aug 2019 17:07)  T(F): 98.2 (11 Aug 2019 04:38), Max: 98.3 (10 Aug 2019 17:07)  HR: 89 (11 Aug 2019 06:23) (60 - 108)  BP: 118/75 (11 Aug 2019 04:38) (90/59 - 150/77)  BP(mean): --  RR: 18 (11 Aug 2019 04:38) (18 - 21)  SpO2: 98% (11 Aug 2019 06:23) (96% - 100%)    Physical Exam:  Gen: NAD, resting comfortably    LLE:  Skin intact  Moving extremity and flexing at hip spontaneously  Does not appear to be tender  Non-tender with axial load or log roll  +EHL/FHL/TA/GS  SILT L2-S1  +DP/PT Pulses  Compartments soft and compressible  No calf TTP B/L

## 2019-08-11 NOTE — BRIEF OPERATIVE NOTE - NSICDXBRIEFPROCEDURE_GEN_ALL_CORE_FT
PROCEDURES:  Closed reduction and percutaneous pinning (CRPP) of left hip 11-Aug-2019 10:18:00  Tracy Sawyer

## 2019-08-11 NOTE — PROGRESS NOTE ADULT - SUBJECTIVE AND OBJECTIVE BOX
89y old  Female who presents with a chief complaint of L femoral neck fracture.    Overnight:  Seen in CCU s/p surgery, extubation.  Drowsy, not answering questions.  No apparent distress.        MEDICATIONS  (STANDING):  ALBUTerol/ipratropium for Nebulization 3 milliLiter(s) Nebulizer every 6 hours  atorvastatin 20 milliGRAM(s) Oral at bedtime  dextrose 5%. 1000 milliLiter(s) (50 mL/Hr) IV Continuous <Continuous>  dextrose 50% Injectable 12.5 Gram(s) IV Push once  dextrose 50% Injectable 25 Gram(s) IV Push once  dextrose 50% Injectable 25 Gram(s) IV Push once  heparin  Infusion.  Unit(s)/Hr (11 mL/Hr) IV Continuous <Continuous>  insulin lispro (HumaLOG) corrective regimen sliding scale   SubCutaneous three times a day before meals  insulin lispro (HumaLOG) corrective regimen sliding scale   SubCutaneous at bedtime  losartan 25 milliGRAM(s) Oral daily    MEDICATIONS  (PRN):  acetaminophen   Tablet .. 650 milliGRAM(s) Oral every 6 hours PRN Temp greater or equal to 38C (100.4F), Mild Pain (1 - 3)  dextrose 40% Gel 15 Gram(s) Oral once PRN Blood Glucose LESS THAN 70 milliGRAM(s)/deciLiter  glucagon  Injectable 1 milliGRAM(s) IntraMuscular once PRN Glucose <70 milliGRAM(s)/deciLiter  heparin  Injectable 4500 Unit(s) IV Push every 6 hours PRN For aPTT less than 40  heparin  Injectable 2000 Unit(s) IV Push every 6 hours PRN For aPTT between 40 - 57      Allergies    No Known Allergies      Vital Signs Last 24 Hrs  T(C): 36.1 (11 Aug 2019 10:35), Max: 36.8 (10 Aug 2019 17:07)  T(F): 97 (11 Aug 2019 10:35), Max: 98.3 (10 Aug 2019 17:07)  HR: 81 (11 Aug 2019 14:30) (81 - 105)  BP: 115/60 (11 Aug 2019 14:00) (109/80 - 150/77)  BP(mean): 73 (11 Aug 2019 14:00) (73 - 88)  RR: 23 (11 Aug 2019 14:30) (16 - 25)  SpO2: 92% (11 Aug 2019 14:30) (89% - 100%)    PHYSICAL EXAM:    GENERAL: NAD, well-groomed, well-developed  HEAD:  Atraumatic, Normocephalic  ENMT: No tonsillar erythema, exudates, or enlargement; Moist mucous membranes, Good dentition, No lesions  NECK: Supple, No JVD, Normal thyroid  NERVOUS SYSTEM:  Drowsy, responds to verbal stimuli  CHEST/LUNG: Clear to ascultation bilaterally; No rales, rhonchi, wheezing, or rubs  HEART: Regular rate and rhythm; No murmurs, rubs, or gallops  ABDOMEN: Soft, Nontender, Nondistended; Bowel sounds present      LABS:                        12.7   9.52  )-----------( 206      ( 11 Aug 2019 12:41 )             41.5     08-11    142  |  106  |  18  ----------------------------<  164<H>  4.8   |  30  |  0.59    Ca    9.3      11 Aug 2019 12:41  Phos  4.0     08-11  Mg     2.0     08-11    TPro  6.2  /  Alb  2.6<L>  /  TBili  0.6  /  DBili  x   /  AST  24  /  ALT  36  /  AlkPhos  66  08-11    PT/INR - ( 11 Aug 2019 06:32 )   PT: 12.5 sec;   INR: 1.11 ratio         PTT - ( 11 Aug 2019 06:32 )  PTT:31.3 sec

## 2019-08-11 NOTE — PROGRESS NOTE ADULT - SUBJECTIVE AND OBJECTIVE BOX
HPI:  89 year old female was brought to the ED because of a left hip fracture and new onset Afib. She fell 5 days ago and had outpatient testing and was at a cardiologist today and was noted to be in afib. No fever, no chills, no abd pain, no back pain, no nausea, no vomiting, no weakness, no headache, no neck pain, no weakness.  Found to have left femoral neck fracture.  She also has T wave inversion and elevated trop.   s/p ortho repair. Feeling fine. Family at bedside.  AFib remains persistent.    Ros: sp ortho repair, confused otherwise. No cp/sob/palpitations reported.    PHYSICAL EXAM:  Vital Signs Last 24 Hrs  T(C): 36.7 (11 Aug 2019 15:56), Max: 36.8 (11 Aug 2019 04:38)  T(F): 98.1 (11 Aug 2019 15:56), Max: 98.2 (11 Aug 2019 04:38)  HR: 94 (11 Aug 2019 17:30) (81 - 105)  BP: 137/91 (11 Aug 2019 16:00) (115/60 - 150/77)  BP(mean): 103 (11 Aug 2019 16:00) (73 - 103)  RR: 29 (11 Aug 2019 17:30) (16 - 29)  SpO2: 95% (11 Aug 2019 17:30) (89% - 100%)    General:  Appears stated age, well-groomed, well-nourished, no distress  HEENT:  NC/AT, patent nares w/ pink mucosa, OP clear w/o lesions, conjunctivae clear, no thyromegaly, nodules, adenopathy, no JVD  Chest:  Full & symmetric excursion, no increased effort, breath sounds clear  Cardiovascular:  Irregular rhythm, S1, S2, no murmur  Abdomen:  Soft, non-tender, non-distended, normoactive bowel sound  Extremities: trace edema  Skin:  warm/dry      LABORATORY:                            12.7   9.52  )-----------( 206      ( 11 Aug 2019 12:41 )             41.5       08-11    142  |  106  |  18  ----------------------------<  164<H>  4.8   |  30  |  0.59    Ca    9.3      11 Aug 2019 12:41  Phos  4.0     08-11  Mg     2.0     08-11    TPro  6.2  /  Alb  2.6<L>  /  TBili  0.6  /  DBili  x   /  AST  24  /  ALT  36  /  AlkPhos  66  08-11                       CARDIAC MARKERS ( 10 Aug 2019 11:32 )  .175 ng/mL / x     / x     / x     / x      CARDIAC MARKERS ( 09 Aug 2019 22:46 )  .298 ng/mL / x     / x     / x     / x      CARDIAC MARKERS ( 09 Aug 2019 17:55 )  .202 ng/mL / x     / x     / x     / x              IMAGING:  < from: CT Hip No Cont, Left (08.09.19 @ 19:31) >  IMPRESSION:   1. displaced impacted fracture of the subcapital region of the left hip.   2. No dislocation.    < end of copied text >    < from: US Duplex Venous Lower Ext Complete, Bilateral (08.10.19 @ 12:27) >  No evidence of deep venous thrombosis in either lower extremity.    < end of copied text >        < from: TTE Echo Doppler w/o Cont (08.10.19 @ 12:30) >    Summary:   1. Left ventricular ejection fraction, by visual estimation, is 50 to   55%.   2. Technically limited study.   3. Normal global left ventricular systolic function.   4. Normal left ventricular internal cavity size.   5. The mitral in-flow pattern reveals no discernable A-wave, therefore   no comment on diastolic function can be made.   6. There is mild concentric left ventricular hypertrophy.   7. Normal right ventricular size and function.   8. There is no evidence of pericardial effusion.   9. Mild mitral annular calcification.  10. Mild mitral valve regurgitation.  11. Moderate thickening and calcification of the anterior and posterior   mitral valve leaflets.  12. Degenerative tricuspid valve.  13. Mild-moderate tricuspid regurgitation.  14. Estimated pulmonary artery systolic pressure is 43.4 mmHg assuming a   right atrial pressure of 5 mmHg, which is consistent with mild pulmonary   hypertension.  15. Peak transaortic gradient equals 18.2 mmHg, mean transaortic gradient   equals 6.2 mmHg, the calculated aortic valve area equals2.14 cm² by the   continuity equation consistent with mild aortic stenosis.  16. There is mild aortic root calcification.    < end of copied text >        ASSESSMENT:  89 year old female was brought to the ED because of a left hip fracture and new onset Afib. She fell 5 days ago and had outpatient testing and was at a cardiologist today and was noted to be in afib. No fever, no chills, no abd pain, no back pain, no nausea, no vomiting, no weakness, no headache, no neck pain, no weakness.  Found to have left femoral neck fracture.  She also has T wave inversion and elevated trop.   s/p ortho repair. Feeling fine. Family at bedside.  AFib remains persistent.  Ischemic demand troponin release.    PLAN:       MEDICATIONS  (STANDING):  ALBUTerol/ipratropium for Nebulization 3 milliLiter(s) Nebulizer every 6 hours  atorvastatin 20 milliGRAM(s) Oral at bedtime  heparin  Infusion.  Unit(s)/Hr (11 mL/Hr) IV Continuous <Continuous>  insulin lispro (HumaLOG) corrective regimen sliding scale   SubCutaneous three times a day before meals  insulin lispro (HumaLOG) corrective regimen sliding scale   SubCutaneous at bedtime  losartan 25 milliGRAM(s) Oral daily    Restarted IV heparin gtt for stroke risk reduction and start PO AC per team.  Post op care management.    Tk Gillette MD, FACC, CINTHIA, OSCAR, FACP  Director, Heart Failure Services  [facility]  , Department of Cardiology  [facility] [employer]

## 2019-08-11 NOTE — CONSULT NOTE ADULT - ATTENDING COMMENTS
Pt seen and examined post operatively 8/11    89F PMH CVA, HTN, HLD, DM presents with complaints of left hip pain s/p fall 5 days prior to admission. Outpatient cardiology visit on day of presentation noted pt to be in new onset a-fib. In ED imaging of hip with left femoral neck fracture. Admitted for new onset a-fib, placed on heparin drip. Seen by ortho. Taken to OR today 8/11 s/p left hip pinning. Intra operatively with episodes of hypotension with spinal anesthesia requiring pushes of phenylephrine. Transferred to ICU post operatively for hemodynamic monitoring.     1. NEURO  - awake, alert, responsive  - moves bilateral lower extremities, sensation intact, pulses intact    2. CV  - post operatively in ICU pt currently hypertensive, no further episodes of hypotension  - can d/c a-line  - if BP stable can resume antihypertensives  - resume heparin drip for a-fib at approximately 6PM tonight 6 hrs post operatively  - monitor PTT and for any signs of bleeding  - currently rate controlled  - cont atorvastatin for hyperlipidemia  - spoke with ortho, can resume asa  - cardiology follow up    3. PULM  - not intubated for procedure as case was done under spinal anesthesia  - ABG noted to have mild hypoxemia  - pt at baseline has not used oxygen, she is an ex smoker but has never complained of SOB and per family never been diagnosed with COPD   - lung exam clear  - CXR with some interstitial markings noted also in 2018  - wean off oxygen supplementation as tolerates, goal to keep O2 sat greater than or equal to 90%  - incentive spirometer    4. ENDO  - cont insulin sliding scale for underlying DM    5. ORTHO  - weight bearing as tolerates  - pt without complaints of pain  - Tylenol prn pain  - physical therapy    6. GEN  - pt with prior known DNR/DNI  - will reinstate  - family at bedside updated on plan of care  - if pt remains stable can transfer back to med surg floor within next 24 hours

## 2019-08-11 NOTE — DISCHARGE NOTE PROVIDER - HOSPITAL COURSE
89y Female brought to the ED because of a left hip fracture and new onset Afib. She was having outpatient testing at a cardiologist and was noted to be in afib. No fever, no chills, no abd pain, no back pain, no nausea, no vomiting, no weakness, no headache, no neck pain, no weakness. Found to have left femoral neck fracture, s/p pinning.        New onset A fib:    - Rate controlled Eliquis 2.5 mg bid    - 2D echo results noted andok for dc            Demand ischemia:     Continue Lipitor 20 mg/day for HLD.             Left femoral neck fracture:    -S/p OR pinning, marcelo for rehab        HTN:    - Continue current meds of Cozaar 25 mg/day.             DM:    - Insulin SS            HLD:    - Continue Lipitor 20 mg/day.             DNR.

## 2019-08-11 NOTE — PROGRESS NOTE ADULT - ASSESSMENT
A/P: 89 y/o female with subcapital impacted femoral neck fracture.  -Pain control  -NPO after midnight  -IV fluids while NPO  -Hold Heparin drip 6 hours prior to surgery (at 0200 on 8/11)  -SCDs  -NWB LLE, bedrest  -Please document medical/cardiac optimization for OR.  -Discussed risks, benefits, and alternatives to surgery with patient's family and HCP at bedside, including but not limited to infection, damage to surrounding tissues, and death. Family expressed understanding and all questions were answered.  -Plan for OR 8/11 at 0800 with Dr. Rasheed for CRPP of L Mayelin Sawyer M.D.  PGY-2 Orthopaedic Surgery A/P: 89 y/o female with subcapital impacted femoral neck fracture.  -Pain control  -NPO after midnight  -IV fluids while NPO  -Hold Heparin drip 6 hours prior to surgery (at 0200 on 8/11)  -SCDs  -NWB LLE, bedrest  -Please document medical/cardiac optimization for OR.  -Discussed risks, benefits, and alternatives to surgery with patient's family and HCP at bedside, including but not limited to infection, damage to surrounding tissues, and death. Family expressed understanding and all questions were answered.  -Plan for OR 8/11 at 0800 with Dr. Rasheed for CRPP of L Hip  -No significant changes since previous H&P performed by medicine team. Pt seen and evaluated and deemed optimized for surgery with high but acceptable risk by medicine and cardiology.    Tracy Sawyer M.D.  PGY-2 Orthopaedic Surgery

## 2019-08-11 NOTE — PROGRESS NOTE ADULT - ASSESSMENT
89 year old female was brought to the ED because of a left hip fracture and new onset Afib. She fell 5 days ago and had outpatient testing and was at a cardiologist today and was noted to be in afib. No fever, no chills, no abd pain, no back pain, no nausea, no vomiting, no weakness, no headache, no neck pain, no weakness. Found to have left femoral neck fracture. She also has T wave inversion and elevated trop.      New onset A fib:  - Rate controlled  - heparin drip restarted  - [id number] 4.  - 2D echo results noted  - Tele monitor  - Cardio follow up      Demand ischemia:  - Continue ASA, statin  Cardiology follow up      Left femoral neck fracture:  -S/p OR today      HTN:  - Continue current med      DM:  - Insulin SS  - Check Hb A1c.      HLD:  - Continue Lipitor.      DNR.

## 2019-08-11 NOTE — DISCHARGE NOTE PROVIDER - CARE PROVIDER_API CALL
Delmer Rasheed)  Orthopaedic Surgery  [address] [place] [address]  Phone: [phone]  Fax: [phone]  Follow Up Time:

## 2019-08-11 NOTE — CONSULT NOTE ADULT - ASSESSMENT
89 yo female with PMH: HTN, CVA, HLD, DM admitted s/p fall found to have left femoral fracture requiring pinning.  Pt hospital course complicated by new onset afib on anticoagulation.  Pt underwent surgery this morning however became profoundly hypotensive intra-op requiring multiple push dose pressors with resolution.  Anesthesiologist requesting ICU monitoring overnight.

## 2019-08-11 NOTE — PROGRESS NOTE ADULT - ASSESSMENT
A/P: 89F s/p L hip CMN POD#0    - Pain control  - PT/OT  - WBAT LLE  - DVT ppx; c/w heparin 6 hours post-op per cards for NSTEMI  - Medicine/Cardiology following  - Spoke with son at bedside and nurse, pt was awake earlier and alert; somnolence likely due to anesthesia; will reassess patient when awake  - Discharge planning; home vs [facility]; likely [facility]  - D/w attending Dr. Rasheed, will advise if plan changes    Henrry Schilling, DO PGY1  Orthopaedic Surgery

## 2019-08-11 NOTE — CONSULT NOTE ADULT - SUBJECTIVE AND OBJECTIVE BOX
Patient is a 89y old  Female who presents with a chief complaint of fall, A fib (11 Aug 2019 15:19)      HPI:  89 year old female was brought to the ED because of a left hip fracture and new onset Afib. She fell 5 days ago and had outpatient testing and was at a cardiologist today and was noted to be in afib. No fever, no chills, no abd pain, no back pain, no nausea, no vomiting, no weakness, no headache, no neck pain, no weakness.  Found to have left femoral neck fracture.  She also has T wave inversion and elevated trop. (09 Aug 2019 19:12)    Consultation called for post op monitoring as pt had become profoundly hypotensive intra-op requiring multiple-dose push pressors.        Allergies    No Known Allergies    Intolerances        MEDICATIONS  (STANDING):  ALBUTerol/ipratropium for Nebulization 3 milliLiter(s) Nebulizer every 6 hours  atorvastatin 20 milliGRAM(s) Oral at bedtime  dextrose 5%. 1000 milliLiter(s) (50 mL/Hr) IV Continuous <Continuous>  dextrose 50% Injectable 12.5 Gram(s) IV Push once  dextrose 50% Injectable 25 Gram(s) IV Push once  dextrose 50% Injectable 25 Gram(s) IV Push once  heparin  Infusion.  Unit(s)/Hr (11 mL/Hr) IV Continuous <Continuous>  insulin lispro (HumaLOG) corrective regimen sliding scale   SubCutaneous three times a day before meals  insulin lispro (HumaLOG) corrective regimen sliding scale   SubCutaneous at bedtime  losartan 25 milliGRAM(s) Oral daily    MEDICATIONS  (PRN):  acetaminophen   Tablet .. 650 milliGRAM(s) Oral every 6 hours PRN Temp greater or equal to 38C (100.4F), Mild Pain (1 - 3)  dextrose 40% Gel 15 Gram(s) Oral once PRN Blood Glucose LESS THAN 70 milliGRAM(s)/deciLiter  glucagon  Injectable 1 milliGRAM(s) IntraMuscular once PRN Glucose <70 milliGRAM(s)/deciLiter  heparin  Injectable 4500 Unit(s) IV Push every 6 hours PRN For aPTT less than 40  heparin  Injectable 2000 Unit(s) IV Push every 6 hours PRN For aPTT between 40 - 57      Daily     Daily Weight in k.6 (11 Aug 2019 04:38)    Drug Dosing Weight  Height (cm): 162.56 (09 Aug 2019 16:34)  Weight (kg): 57.6 (09 Aug 2019 16:34)  BMI (kg/m2): 21.8 (09 Aug 2019 16:34)  BSA (m2): 1.61 (09 Aug 2019 16:34)    PAST MEDICAL & SURGICAL HISTORY:  Hyperlipidemia  DM (diabetes mellitus)  HTN (hypertension)  No significant past surgical history      FAMILY HISTORY:  No pertinent family history in first degree relatives      SOCIAL HISTORY:    ADVANCE DIRECTIVES:    REVIEW OF SYSTEMS:    Pt denies any complaints at this time          ICU Vital Signs Last 24 Hrs  T(C): 36.1 (11 Aug 2019 10:35), Max: 36.8 (10 Aug 2019 17:07)  T(F): 97 (11 Aug 2019 10:35), Max: 98.3 (10 Aug 2019 17:07)  HR: 81 (11 Aug 2019 14:30) (81 - 105)  BP: 115/60 (11 Aug 2019 14:00) (109/80 - 150/77)  BP(mean): 73 (11 Aug 2019 14:00) (73 - 88)  ABP: 114/59 (11 Aug 2019 14:30) (108/60 - 150/73)  ABP(mean): 79 (11 Aug 2019 14:30) (76 - 108)  RR: 23 (11 Aug 2019 14:30) (16 - 25)  SpO2: 92% (11 Aug 2019 14:30) (89% - 100%)      ABG - ( 11 Aug 2019 07:36 )  pH, Arterial: x     pH, Blood: 7.39  /  pCO2: 53    /  pO2: 68    / HCO3: 31    / Base Excess: 5.6   /  SaO2: 93                  I&O's Detail    10 Aug 2019 07:  -  11 Aug 2019 07:00  --------------------------------------------------------  IN:    heparin  Infusion.: 108 mL    Oral Fluid: 250 mL  Total IN: 358 mL    OUT:    Voided: 500 mL  Total OUT: 500 mL    Total NET: -142 mL      11 Aug 2019 07:01  -  11 Aug 2019 15:31  --------------------------------------------------------  IN:    Oral Fluid: 360 mL  Total IN: 360 mL    OUT:  Total OUT: 0 mL    Total NET: 360 mL          PHYSICAL EXAM:    GENERAL: NAD, well-groomed, well-developed  HEAD:  Atraumatic, Normocephalic  EYES: conjunctiva and sclera clear  ENMT:  Moist mucous membranes,   NERVOUS SYSTEM:  Alert & Oriented X3, CHEST/LUNG: Clear to percussion bilaterally; No rales, rhonchi, wheezing, or rubs  HEART: Regular rate and rhythm;   ABDOMEN: Soft, Nontender, Nondistended; Bowel sounds present  EXTREMITIES:  left hip wound dressing in place,       LABS:  CBC Full  -  ( 11 Aug 2019 12:41 )  WBC Count : 9.52 K/uL  RBC Count : 4.43 M/uL  Hemoglobin : 12.7 g/dL  Hematocrit : 41.5 %  Platelet Count - Automated : 206 K/uL  Mean Cell Volume : 93.7 fl  Mean Cell Hemoglobin : 28.7 pg  Mean Cell Hemoglobin Concentration : 30.6 gm/dL  Auto Neutrophil # : x  Auto Lymphocyte # : x  Auto Monocyte # : x  Auto Eosinophil # : x  Auto Basophil # : x  Auto Neutrophil % : x  Auto Lymphocyte % : x  Auto Monocyte % : x  Auto Eosinophil % : x  Auto Basophil % : x    08-    142  |  106  |  18  ----------------------------<  164<H>  4.8   |  30  |  0.59    Ca    9.3      11 Aug 2019 12:41  Phos  4.0       Mg     2.0         TPro  6.2  /  Alb  2.6<L>  /  TBili  0.6  /  DBili  x   /  AST  24  /  ALT  36  /  AlkPhos  66  08-11    CAPILLARY BLOOD GLUCOSE      POCT Blood Glucose.: 150 mg/dL (11 Aug 2019 12:49)    PT/INR - ( 11 Aug 2019 06:32 )   PT: 12.5 sec;   INR: 1.11 ratio         PTT - ( 11 Aug 2019 06:32 )  PTT:31.3 sec    CARDIAC MARKERS ( 10 Aug 2019 11:32 )  .175 ng/mL / x     / x     / x     / x      CARDIAC MARKERS ( 09 Aug 2019 22:46 )  .298 ng/mL / x     / x     / x     / x      CARDIAC MARKERS ( 09 Aug 2019 17:55 )  .202 ng/mL / x     / x     / x     / x            CRITICAL CARE TIME SPENT:

## 2019-08-11 NOTE — DISCHARGE NOTE PROVIDER - NSDCCPCAREPLAN_GEN_ALL_CORE_FT
PRINCIPAL DISCHARGE DIAGNOSIS  Diagnosis: Closed fracture of left hip, initial encounter  Assessment and Plan of Treatment: 1.	Pain Control  2.	WBAT left lower extremity, with assistive devices as needed.  3.	DVT Prophylaxis  4.	PT as needed  5.	Follow up with Dr. Rasheed as outpatient in 10-14 days after discharge from the hospital or rehab. Call office for appointment.   6.	Staple/Suture removal and repeat x-rays on Post-Op Day 14.  7.	Ice/Elevate affected area as needed.  8.	Keep Dressing/Splint/Cast Clean and dry.      SECONDARY DISCHARGE DIAGNOSES  Diagnosis: Atrial fibrillation  Assessment and Plan of Treatment:

## 2019-08-11 NOTE — PROGRESS NOTE ADULT - SUBJECTIVE AND OBJECTIVE BOX
Ortho Post-op Check    Pt tolerated procedure well without complications. Pt seen and examined at baseline, somnolent and not cooperative with exam. Per nurse and son at bedside pt has been awake and alert previously today and is a heavy sleeper.    Vital Signs Last 24 Hrs  T(C): 36.7 (11 Aug 2019 15:56), Max: 36.8 (10 Aug 2019 17:07)  T(F): 98.1 (11 Aug 2019 15:56), Max: 98.3 (10 Aug 2019 17:07)  HR: 81 (11 Aug 2019 14:30) (81 - 105)  BP: 115/60 (11 Aug 2019 14:00) (109/80 - 150/77)  BP(mean): 73 (11 Aug 2019 14:00) (73 - 88)  RR: 23 (11 Aug 2019 14:30) (16 - 25)  SpO2: 92% (11 Aug 2019 14:30) (89% - 100%)                          12.7   9.52  )-----------( 206      ( 11 Aug 2019 12:41 )             41.5   08-11    142  |  106  |  18  ----------------------------<  164<H>  4.8   |  30  |  0.59    Ca    9.3      11 Aug 2019 12:41  Phos  4.0     08-11  Mg     2.0     08-11    TPro  6.2  /  Alb  2.6<L>  /  TBili  0.6  /  DBili  x   /  AST  24  /  ALT  36  /  AlkPhos  66  08-11    PE  LLE:  Dressing C/D/I  Sensation unable to be assessed  + EHL/FHL/TA/GSC  + PT/DP pulses  Compartments soft, compressible

## 2019-08-12 LAB
ANION GAP SERPL CALC-SCNC: 6 MMOL/L — SIGNIFICANT CHANGE UP (ref 5–17)
APTT BLD: 128.1 SEC — CRITICAL HIGH (ref 27.5–36.3)
APTT BLD: 185.2 SEC — CRITICAL HIGH (ref 28.5–37)
APTT BLD: 58.7 SEC — HIGH (ref 28.5–37)
BUN SERPL-MCNC: 28 MG/DL — HIGH (ref 7–23)
CALCIUM SERPL-MCNC: 9.3 MG/DL — SIGNIFICANT CHANGE UP (ref 8.5–10.1)
CHLORIDE SERPL-SCNC: 104 MMOL/L — SIGNIFICANT CHANGE UP (ref 96–108)
CO2 SERPL-SCNC: 30 MMOL/L — SIGNIFICANT CHANGE UP (ref 22–31)
CREAT SERPL-MCNC: 0.64 MG/DL — SIGNIFICANT CHANGE UP (ref 0.5–1.3)
GLUCOSE BLDC GLUCOMTR-MCNC: 131 MG/DL — HIGH (ref 70–99)
GLUCOSE BLDC GLUCOMTR-MCNC: 146 MG/DL — HIGH (ref 70–99)
GLUCOSE BLDC GLUCOMTR-MCNC: 147 MG/DL — HIGH (ref 70–99)
GLUCOSE BLDC GLUCOMTR-MCNC: 179 MG/DL — HIGH (ref 70–99)
GLUCOSE SERPL-MCNC: 175 MG/DL — HIGH (ref 70–99)
HCT VFR BLD CALC: 41.7 % — SIGNIFICANT CHANGE UP (ref 34.5–45)
HCT VFR BLD CALC: 41.8 % — SIGNIFICANT CHANGE UP (ref 34.5–45)
HGB BLD-MCNC: 12.7 G/DL — SIGNIFICANT CHANGE UP (ref 11.5–15.5)
HGB BLD-MCNC: 12.9 G/DL — SIGNIFICANT CHANGE UP (ref 11.5–15.5)
MAGNESIUM SERPL-MCNC: 2.4 MG/DL — SIGNIFICANT CHANGE UP (ref 1.6–2.6)
MCHC RBC-ENTMCNC: 28.3 PG — SIGNIFICANT CHANGE UP (ref 27–34)
MCHC RBC-ENTMCNC: 28.8 PG — SIGNIFICANT CHANGE UP (ref 27–34)
MCHC RBC-ENTMCNC: 30.4 GM/DL — LOW (ref 32–36)
MCHC RBC-ENTMCNC: 30.9 GM/DL — LOW (ref 32–36)
MCV RBC AUTO: 93.1 FL — SIGNIFICANT CHANGE UP (ref 80–100)
MCV RBC AUTO: 93.3 FL — SIGNIFICANT CHANGE UP (ref 80–100)
NRBC # BLD: 0 /100 WBCS — SIGNIFICANT CHANGE UP (ref 0–0)
NRBC # BLD: 0 /100 WBCS — SIGNIFICANT CHANGE UP (ref 0–0)
PHOSPHATE SERPL-MCNC: 4 MG/DL — SIGNIFICANT CHANGE UP (ref 2.5–4.5)
PLATELET # BLD AUTO: 207 K/UL — SIGNIFICANT CHANGE UP (ref 150–400)
PLATELET # BLD AUTO: 227 K/UL — SIGNIFICANT CHANGE UP (ref 150–400)
POTASSIUM SERPL-MCNC: 4.5 MMOL/L — SIGNIFICANT CHANGE UP (ref 3.5–5.3)
POTASSIUM SERPL-SCNC: 4.5 MMOL/L — SIGNIFICANT CHANGE UP (ref 3.5–5.3)
RBC # BLD: 4.48 M/UL — SIGNIFICANT CHANGE UP (ref 3.8–5.2)
RBC # BLD: 4.48 M/UL — SIGNIFICANT CHANGE UP (ref 3.8–5.2)
RBC # FLD: 14 % — SIGNIFICANT CHANGE UP (ref 10.3–14.5)
RBC # FLD: 14.1 % — SIGNIFICANT CHANGE UP (ref 10.3–14.5)
SODIUM SERPL-SCNC: 140 MMOL/L — SIGNIFICANT CHANGE UP (ref 135–145)
WBC # BLD: 10.42 K/UL — SIGNIFICANT CHANGE UP (ref 3.8–10.5)
WBC # BLD: 8.72 K/UL — SIGNIFICANT CHANGE UP (ref 3.8–10.5)
WBC # FLD AUTO: 10.42 K/UL — SIGNIFICANT CHANGE UP (ref 3.8–10.5)
WBC # FLD AUTO: 8.72 K/UL — SIGNIFICANT CHANGE UP (ref 3.8–10.5)

## 2019-08-12 PROCEDURE — 99232 SBSQ HOSP IP/OBS MODERATE 35: CPT

## 2019-08-12 RX ORDER — APIXABAN 2.5 MG/1
2.5 TABLET, FILM COATED ORAL EVERY 12 HOURS
Refills: 0 | Status: DISCONTINUED | OUTPATIENT
Start: 2019-08-12 | End: 2019-08-15

## 2019-08-12 RX ORDER — IPRATROPIUM/ALBUTEROL SULFATE 18-103MCG
3 AEROSOL WITH ADAPTER (GRAM) INHALATION EVERY 6 HOURS
Refills: 0 | Status: DISCONTINUED | OUTPATIENT
Start: 2019-08-12 | End: 2019-08-14

## 2019-08-12 RX ADMIN — HEPARIN SODIUM 1000 UNIT(S)/HR: 5000 INJECTION INTRAVENOUS; SUBCUTANEOUS at 01:20

## 2019-08-12 RX ADMIN — APIXABAN 2.5 MILLIGRAM(S): 2.5 TABLET, FILM COATED ORAL at 15:34

## 2019-08-12 RX ADMIN — Medication 650 MILLIGRAM(S): at 12:36

## 2019-08-12 RX ADMIN — ATORVASTATIN CALCIUM 20 MILLIGRAM(S): 80 TABLET, FILM COATED ORAL at 22:55

## 2019-08-12 RX ADMIN — Medication 3 MILLILITER(S): at 11:13

## 2019-08-12 RX ADMIN — Medication 650 MILLIGRAM(S): at 13:00

## 2019-08-12 RX ADMIN — Medication 1: at 12:08

## 2019-08-12 RX ADMIN — HEPARIN SODIUM 0 UNIT(S)/HR: 5000 INJECTION INTRAVENOUS; SUBCUTANEOUS at 07:50

## 2019-08-12 RX ADMIN — Medication 3 MILLILITER(S): at 05:26

## 2019-08-12 RX ADMIN — HEPARIN SODIUM 800 UNIT(S)/HR: 5000 INJECTION INTRAVENOUS; SUBCUTANEOUS at 09:03

## 2019-08-12 RX ADMIN — LOSARTAN POTASSIUM 25 MILLIGRAM(S): 100 TABLET, FILM COATED ORAL at 06:03

## 2019-08-12 RX ADMIN — Medication 81 MILLIGRAM(S): at 12:09

## 2019-08-12 NOTE — CHART NOTE - NSCHARTNOTEFT_GEN_A_CORE
Pt medically stable for transfer to medical floor.     89 yo female with PMH: HTN, CVA, HLD, DM admitted s/p fall found to have left femoral fracture requiring pinning.  Pt hospital course complicated by new onset afib on anticoagulation.  Pt underwent surgery this morning however became profoundly hypotensive intra-op requiring multiple push dose pressors with resolution.  Anesthesiologist requesting ICU monitoring overnight.  Pt did well overnight without any issues and is hemodynamically stable. Pt medically stable for transfer to medical floor.  Signed out to Dr. Wan      89 yo female with PMH: HTN, CVA, HLD, DM admitted s/p fall found to have left femoral fracture requiring pinning.  Pt hospital course complicated by new onset afib on anticoagulation.  Pt underwent surgery this morning however became profoundly hypotensive intra-op requiring multiple push dose pressors with resolution.  Anesthesiologist requesting ICU monitoring overnight.  Pt did well overnight without any issues and is hemodynamically stable.

## 2019-08-12 NOTE — PROGRESS NOTE ADULT - ASSESSMENT
A/P: 89F s/p L hip CMN POD#1    - Pain control  - PT/OT  - WBAT LLE  - DVT ppx; heparin drip per cards  - Medicine/Cardiology following  - Discharge planning; home vs [facility]; likely [facility]  - D/w attending Dr. Rasheed, will advise if plan changes    Henrry Schilling, DO PGY1  Orthopaedic Surgery

## 2019-08-12 NOTE — PHYSICAL THERAPY INITIAL EVALUATION ADULT - FOLLOWS COMMANDS/ANSWERS QUESTIONS, REHAB EVAL
pt is able to follow single step commands 100% of the time & is able to follow multi-step commands 50% of the time

## 2019-08-12 NOTE — PHYSICAL THERAPY INITIAL EVALUATION ADULT - PERTINENT HX OF CURRENT PROBLEM, REHAB EVAL
Pt is a 89yo F admitted s/p fall. Imaging revealed subcapital femoral neck fx. Pt is now s/p L hip CRPP on 8/11.

## 2019-08-12 NOTE — PHYSICAL THERAPY INITIAL EVALUATION ADULT - ADDITIONAL COMMENTS
Son (Demarco) provided social history/prior level of function over the phone ([phone])    Pt lives with her son, daughter-in-law, & grandchildren in a private home, 3 entry steps (+ rail), pt stays on main floor. Prior to admission pt was independent with all functional mobility including community ambulation without a device. Pt performs ADL's independently. Pt owns rolling walker from 2018 admission for CVA but does not use it. Pt is right hand dominant. Pt also speaks Icelandic Creole. Goal of therapy: return to prior level of function.

## 2019-08-12 NOTE — PROGRESS NOTE ADULT - ASSESSMENT
89 yo female with left hip fracture, and likely new onset AFib.  s/o ortho surgery, became hypotensive post op but otherwise has been asymptomatic.  On aspirin and  IV heparin  Infusion, if no contra-indication, will change to DOAC.  To continue f/u with Dr. Jose Starkey, her PCP.

## 2019-08-12 NOTE — PROGRESS NOTE ADULT - SUBJECTIVE AND OBJECTIVE BOX
CHIEF COMPLAINT: s/p fall and left hip pain    Interval Events:   no o/n events  seen sitting in chair, offers no complaints    REVIEW OF SYSTEMS:  Constitutional: [ -] fevers [ -] chills [ ] weight loss [ ] weight gain  HEENT: [ ] dry eyes [ ] eye irritation [ ] postnasal drip [ ] nasal congestion  CV: [ -] chest pain [ ] orthopnea [ -] palpitations [ ] murmur  Resp: [- ] cough [- ] shortness of breath [- ] dyspnea [- ] wheezing [ ] sputum [ ] hemoptysis  GI: [- ] nausea [- ] vomiting [ -] diarrhea [ ] constipation [- ] abd pain [ ] dysphagia   : [ ] dysuria [ ] nocturia [ ] hematuria [ ] increased urinary frequency  Musculoskeletal: [ ] back pain [ ] myalgias [ ] arthralgias [ ] fracture  Skin: [ ] rash [ ] itch  Neurological: [ ] headache [- ] dizziness [ ] syncope [ ] weakness [ ] numbness  Psychiatric: [ ] anxiety [ ] depression  Endocrine: [ ] diabetes [ ] thyroid problem  Hematologic/Lymphatic: [ ] anemia [ ] bleeding problem  Allergic/Immunologic: [ ] itchy eyes [ ] nasal discharge [ ] hives [ ] angioedema  [x] All other systems negative  [ ] Unable to assess ROS because ________    OBJECTIVE:  ICU Vital Signs Last 24 Hrs  T(C): 36.8 (12 Aug 2019 07:30), Max: 37.1 (11 Aug 2019 19:54)  T(F): 98.3 (12 Aug 2019 07:30), Max: 98.8 (11 Aug 2019 19:54)  HR: 86 (12 Aug 2019 10:05) (78 - 99)  BP: 122/62 (12 Aug 2019 10:05) (103/63 - 185/82)  BP(mean): 87 (12 Aug 2019 10:00) (69 - 108)  ABP: 144/72 (11 Aug 2019 19:00) (108/60 - 179/92)  ABP(mean): 83 (11 Aug 2019 19:00) (76 - 125)  RR: 18 (12 Aug 2019 10:05) (16 - 41)  SpO2: 90% (12 Aug 2019 10:05) (89% - 100%)        08-11 @ 07:01  -  08-12 @ 07:00  --------------------------------------------------------  IN: 497 mL / OUT: 600 mL / NET: -103 mL    08-12 @ 07:01  - 08-12 @ 10:55  --------------------------------------------------------  IN: 20 mL / OUT: 0 mL / NET: 20 mL      CAPILLARY BLOOD GLUCOSE      POCT Blood Glucose.: 147 mg/dL (12 Aug 2019 07:26)      PHYSICAL EXAM:  General: NAD, awake alert  HEENT: MMM, EOMI  Neck: supple  Respiratory: CTA b/l, no rales, crackles  Cardiovascular: s1s2 irregular irregular rhythm  Abdomen: soft, non tender, non distended  Extremities: warm, no edema  Skin: dressing over left hip- CDI, no pain  Neurological: no focal deficits  Psychiatry: pleasant, alert and oriented x 2    LINES: n/a    HOSPITAL MEDICATIONS:  MEDICATIONS  (STANDING):  ALBUTerol/ipratropium for Nebulization 3 milliLiter(s) Nebulizer every 6 hours  aspirin  chewable 81 milliGRAM(s) Oral daily  atorvastatin 20 milliGRAM(s) Oral at bedtime  dextrose 5%. 1000 milliLiter(s) (50 mL/Hr) IV Continuous <Continuous>  dextrose 50% Injectable 12.5 Gram(s) IV Push once  dextrose 50% Injectable 25 Gram(s) IV Push once  dextrose 50% Injectable 25 Gram(s) IV Push once  heparin  Infusion.  Unit(s)/Hr (11 mL/Hr) IV Continuous <Continuous>  insulin lispro (HumaLOG) corrective regimen sliding scale   SubCutaneous three times a day before meals  insulin lispro (HumaLOG) corrective regimen sliding scale   SubCutaneous at bedtime  losartan 25 milliGRAM(s) Oral daily    MEDICATIONS  (PRN):  acetaminophen   Tablet .. 650 milliGRAM(s) Oral every 6 hours PRN Temp greater or equal to 38C (100.4F), Mild Pain (1 - 3)  dextrose 40% Gel 15 Gram(s) Oral once PRN Blood Glucose LESS THAN 70 milliGRAM(s)/deciLiter  glucagon  Injectable 1 milliGRAM(s) IntraMuscular once PRN Glucose <70 milliGRAM(s)/deciLiter  heparin  Injectable 4500 Unit(s) IV Push every 6 hours PRN For aPTT less than 40  heparin  Injectable 2000 Unit(s) IV Push every 6 hours PRN For aPTT between 40 - 57      LABS:                        12.7   10.42 )-----------( 207      ( 12 Aug 2019 07:02 )             41.8     Hgb Trend: 12.7<--, 12.9<--, 12.7<--, 12.7<--, 13.3<--  08-12    140  |  104  |  28<H>  ----------------------------<  175<H>  4.5   |  30  |  0.64    Ca    9.3      12 Aug 2019 07:02  Phos  4.0     08-12  Mg     2.4     08-12    TPro  6.2  /  Alb  2.6<L>  /  TBili  0.6  /  DBili  x   /  AST  24  /  ALT  36  /  AlkPhos  66  08-11    PT/INR - ( 11 Aug 2019 06:32 )   PT: 12.5 sec;   INR: 1.11 ratio         PTT - ( 12 Aug 2019 07:02 )  PTT:185.2 sec    Arterial Blood Gas:  08-11 @ 07:36  --/53/68/31/93/5.6  ABG lactate: --

## 2019-08-12 NOTE — PHYSICAL THERAPY INITIAL EVALUATION ADULT - BALANCE TRAINING, PT EVAL
GOAL: pt will be able to sit at edge of bed while performing UE manipulation without loss of balance within 8 weeks. pt will be able to independently ambulate 150ft with rolling walker within 8 weeks

## 2019-08-12 NOTE — PHYSICAL THERAPY INITIAL EVALUATION ADULT - TRANSFER TRAINING, PT EVAL
GOAL: pt will be able to perform all transfers independently with use of rolling walker within 8 weeks

## 2019-08-12 NOTE — PROGRESS NOTE ADULT - ASSESSMENT
89F w/ CVA, HTN, HLD, DM presents with complaints of left hip pain s/p fall 5 days prior to admission found to have left femoral neck fracture. Of note on day of admission, outpatient cardiology found pt to be in new onset a-fib. Admitted for hip fracture and new onset a-fib, placed on heparin drip. s/p OR yesterday 8/11 for left hip pinning. Intra operatively with episodes of hypotension (received spinal anesthesia) requiring pushes of phenylephrine. Transferred to ICU post operatively for hemodynamic monitoring.     1. NEURO  - awake, alert, responsive, mildly confused which is at baseline  - moves bilateral lower extremities, sensation intact, pulses intact    2. CV  - since admission to ICU no episodes of hypotension   - started on losartan for hypertension  - on heparin gtt for afib, monitor PTT and for any signs of bleeding   - currently rate controlled, not on any meds  - cont atorvastatin for hyperlipidemia  - continue w/ asa  - cardiology follow up    3. PULM  - not intubated for procedure as case was done under spinal anesthesia  - ABG was noted to have mild hypoxemia  - pt at baseline has not used oxygen, she is an ex smoker but has never complained of SOB and per family never been diagnosed with COPD, although she does have a mildly elevated bicarb on this admission   - lung exam clear  - CXR with some interstitial markings noted also in 2018  - wean off oxygen supplementation as tolerates, keep O2 sat greater than or equal to 90%  - Duonebs q6 PRN  - incentive spirometer    4. ENDO  - FS controlled  - cont insulin sliding scale for underlying DM    5. ORTHO  - s/p left femoral neck pinning on 8/11  - weight bearing as tolerates, OOB to chair  - pt without complaints of pain  - Tylenol prn pain  - physical therapy    6. DISPO  - pt with prior known DNR/DNI- reinstated post-op  - family at bedside updated on plan of care  - transfer to medical floor

## 2019-08-12 NOTE — PROVIDER CONTACT NOTE (CRITICAL VALUE NOTIFICATION) - ACTION/TREATMENT ORDERED:
heparin nomogram protocol maintained.
PA notified and aware. heparin nomogram to be followed. will continue to monitor.
MD ordered: continue monitoring; follow ACS nomogram
PA ordered: continue monitoring; notify of any changes

## 2019-08-12 NOTE — PROGRESS NOTE ADULT - SUBJECTIVE AND OBJECTIVE BOX
CARDIOLOGY PROGRESS NOTE    TANYA OROURKE is a 89y Female brought to the ED because of a left hip fracture and new onset Afib. She was having outpatient testing at a cardiologist and was noted to be in afib. No fever, no chills, no abd pain, no back pain, no nausea, no vomiting, no weakness, no headache, no neck pain, no weakness.  Found to have left femoral neck fracture.  Noted (new?) T wave inversion and elevated troponins, remains in AFib.      REVIEW OF SYSTEMS:    CONSTITUTIONAL: No fever, weight loss, or fatigue  EYES: No eye pain, visual disturbances, or discharge  ENMT:  No difficulty hearing, tinnitus, vertigo; No sinus or throat pain  NECK: No pain or stiffness  BREASTS: No pain, masses, or nipple discharge  RESPIRATORY: No cough, wheezing, chills or hemoptysis; No shortness of breath  CARDIOVASCULAR: No chest pain, palpitations, dizziness, or leg swelling  GASTROINTESTINAL: No abdominal or epigastric pain. No nausea, vomiting, or hematemesis; No diarrhea or constipation. No melena or hematochezia.  GENITOURINARY: No dysuria, frequency, hematuria, or incontinence  NEUROLOGICAL: No headaches, memory loss, loss of strength, numbness, or tremors  SKIN: No itching, burning, rashes, or lesions   LYMPH NODES: No enlarged glands  ENDOCRINE: No heat or cold intolerance; No hair loss  MUSCULOSKELETAL: No joint pain or swelling; No muscle, back, or extremity pain  PSYCHIATRIC: No depression, anxiety, mood swings, or difficulty sleeping  HEME/LYMPH: No easy bruising, or bleeding gums  ALLERGY AND IMMUNOLOGIC: No hives or eczema    MEDICATIONS  (STANDING):  aspirin  chewable 81 milliGRAM(s) Oral daily  atorvastatin 20 milliGRAM(s) Oral at bedtime  dextrose 5%. 1000 milliLiter(s) (50 mL/Hr) IV Continuous <Continuous>  dextrose 50% Injectable 12.5 Gram(s) IV Push once  dextrose 50% Injectable 25 Gram(s) IV Push once  dextrose 50% Injectable 25 Gram(s) IV Push once  heparin  Infusion.  Unit(s)/Hr (11 mL/Hr) IV Continuous <Continuous>  insulin lispro (HumaLOG) corrective regimen sliding scale   SubCutaneous three times a day before meals  insulin lispro (HumaLOG) corrective regimen sliding scale   SubCutaneous at bedtime  losartan 25 milliGRAM(s) Oral daily    MEDICATIONS  (PRN):  acetaminophen   Tablet .. 650 milliGRAM(s) Oral every 6 hours PRN Temp greater or equal to 38C (100.4F), Mild Pain (1 - 3)  ALBUTerol/ipratropium for Nebulization 3 milliLiter(s) Nebulizer every 6 hours PRN Shortness of Breath and/or Wheezing  dextrose 40% Gel 15 Gram(s) Oral once PRN Blood Glucose LESS THAN 70 milliGRAM(s)/deciLiter  glucagon  Injectable 1 milliGRAM(s) IntraMuscular once PRN Glucose <70 milliGRAM(s)/deciLiter  heparin  Injectable 4500 Unit(s) IV Push every 6 hours PRN For aPTT less than 40  heparin  Injectable 2000 Unit(s) IV Push every 6 hours PRN For aPTT between 40 - 57    atorvastatin 40 mg oral tablet: 1 tab(s) orally once a day (at bedtime)  Lipitor 20 mg oral tablet: 1 tab(s) orally once a day  losartan 25 mg oral tablet: 1 tab(s) orally once a day    ALLERGIES: No Known Allergies      FAMILY HISTORY: FAMILY HISTORY:  No pertinent family history in first degree relatives      PHYSICAL EXAMINATION:  -----------------------------  T(C): 37.2 (19 @ 12:00), Max: 37.2 (19 @ 12:00)  HR: 99 (19 @ 12:00) (78 - 99)  BP: 132/71 (19 @ 12:00) (103/63 - 185/82)  RR: 23 (19 @ 12:00) (16 - 41)  SpO2: 98% (19 @ 11:17) (90% - 100%)  Wt(kg): --     @ 07:01  -   @ 07:00  --------------------------------------------------------  IN:    heparin  Infusion.: 137 mL    Oral Fluid: 360 mL  Total IN: 497 mL    OUT:    Voided: 600 mL  Total OUT: 600 mL    Total NET: -103 mL       @ 07:01  -   @ 14:25  --------------------------------------------------------  IN:    heparin  Infusion.: 30 mL  Total IN: 30 mL    OUT:  Total OUT: 0 mL    Total NET: 30 mL            Constitutional: well developed, normal appearance, well groomed, well nourished, no deformities and no acute distress.   Eyes: the conjunctiva exhibited no abnormalities and the eyelids demonstrated no xanthelasmas.   HEENT: normal oral mucosa, no oral pallor and no oral cyanosis.   Neck: normal jugular venous A waves present, normal jugular venous V waves present and no jugular venous lee A waves.   Pulmonary: no respiratory distress, normal respiratory rhythm and effort, no accessory muscle use and lungs were clear to auscultation bilaterally.   Cardiovascular: heart rate and rhythm were normal, normal S1 and S2 and no murmur, gallop, rub, heave or thrill are present.   Abdomen: soft, non-tender, no hepato-splenomegaly and no abdominal mass palpated.   Musculoskeletal: the gait could not be assessed..   Extremities: no clubbing of the fingernails, no localized cyanosis, no petechial hemorrhages and no ischemic changes.   Skin: normal skin color and pigmentation, no rash, no venous stasis, no skin lesions, no skin ulcer and no xanthoma was observed.   Psychiatric: oriented to person, place, and time, the affect was normal, the mood was normal and not feeling anxious.     LABS:   --------      140  |  104  |  28<H>  ----------------------------<  175<H>  4.5   |  30  |  0.64    Ca    9.3      12 Aug 2019 07:02  Phos  4.0       Mg     2.4         TPro  6.2  /  Alb  2.6<L>  /  TBili  0.6  /  DBili  x   /  AST  24  /  ALT  36  /  AlkPhos  66  08                         12.7   10.42 )-----------( 207      ( 12 Aug 2019 07:02 )             41.8     PT/INR - ( 11 Aug 2019 06:32 )   PT: 12.5 sec;   INR: 1.11 ratio         PTT - ( 12 Aug 2019 07:02 )  PTT:185.2 sec          CARDIAC MARKERS ( 10 Aug 2019 11:32 )  .175 ng/mL / x     / x     / x     / x      CARDIAC MARKERS ( 09 Aug 2019 22:46 )  .298 ng/mL / x     / x     / x     / x      CARDIAC MARKERS ( 09 Aug 2019 17:55 )  .202 ng/mL / x     / x     / x     / x            RADIOLOGY:  -----------------  < from: TTE Echo Doppler w/o Cont (08.10.19 @ 12:30) >     EXAM:  TTE WO CON COMPLETE W DOPPL         PROCEDURE DATE:  08/10/2019        INTERPRETATION:  REPORT:    TRANSTHORACIC ECHOCARDIOGRAM REPORT         Patient Name:   TANYA OROURKE Patient Location: [facility] Rec #:  [id number]   Accession #:      [id number]  Account #:                   Height:           64.0 in 162.6 cm  YOB: 1929    Weight:           113.8 lb 51.60 kg  Patient Age:    89 years     BSA:              1.54 m²  Patient Gender: F            BP:               164/94 mmHg       Date of Exam:        8/10/2019 12:30:45 PM  Sonographer:         DAMIAN  Referring Physician: CASEY    Procedure:     2D Echo/Doppler/Color Doppler Complete.  Indications:   Abnormal electrocardiogram [ECG] [EKG] - R94.31;   Unspecified              atrial fibrillation - I48.91  Diagnosis:     Abnormal electrocardiogram [ECG] [EKG] - R94.31;   Unspecified                 atrial fibrillation - I48.91  Study Details: Technically limited study.         2D AND M-MODE MEASUREMENTS (normalranges within parentheses):  Left Ventricle:                  Normal   Aorta/Left Atrium:            Normal  IVSd (2D):              1.49 cm (0.7-1.1) Aortic Root (2D):  3.15 cm   (2.4-3.7)  LVPWd (2D):             1.28 cm (0.7-1.1) Left Atrium (2D): 4.28 cm   (1.9-4.0)  LVIDd (2D):             3.47 cm (3.4-5.7)  LVIDs (2D):             2.49 cm  LV FS (2D):             28.3 %   (>25%)  Relative Wall Thickness  0.74    (<0.42)    LV DIASTOLIC FUNCTION:  MV Peak E: 1.23 m/s Decel Time: 209 msec  MV Peak A: 0.30 m/s  E/A Ratio: 4.04    SPECTRAL DOPPLER ANALYSIS (where applicable):  Mitral Valve:  MV Mean Grad: 2.0 mmHg MV P1/2 Time: 60.75 msec                         MV Area, PHT: 3.62 cm²    Aortic Valve: AoV Max Marcello: 2.13 m/s AoV Peak P.2 mmHg AoV Mean P.2 mmHg    LVOT Vmax: 1.42 m/s LVOT VTI: 0.163 m LVOT Diameter: 1.98 cm    AoV Area, Vmax: 2.06 cm² AoV Area, VTI: 2.14 cm² AoV Area, Vmn: 2.02 cm²    Tricuspid Valve and PA/RV Systolic Pressure: TR Max Velocity: 3.10 m/s RA   Pressure: 5 mmHg RVSP/PASP: 43.4 mmHg       PHYSICIAN INTERPRETATION:  Left Ventricle: The left ventricular internal cavity size is normal.  Global LV systolic function was normal. Left ventricular ejection   fraction, by visual estimation, is 50 to 55%. The mitral in-flow pattern   reveals no discernable A-wave, therefore no comment on diastolic function   can be made.  Right Ventricle: Normal right ventricular size and function.  Left Atrium: Moderately enlarged left atrium.  Right Atrium: Mildlyenlarged right atrium.  Pericardium: There is no evidence of pericardial effusion.  Mitral Valve: Moderate thickening and calcification of the anterior and   posterior mitral valve leaflets. Mobility is mildly decreased for both   leaflets. There is mild mitral annular calcification. Peak transmitral   mean gradient equals 2.0 mmHg, calculated mitral valve area by pressure   half time equals 3.62 cm² consistent with No evidence of mitral stenosis.   Mild mitral valve regurgitation is seen.  Tricuspid Valve: The tricuspid valve is degenerative in appearance.   Mild-moderate tricuspid regurgitation is visualized. Estimated pulmonary   artery systolic pressure is 43.4 mmHg assuming a right atrial pressure of   5 mmHg, which is consistent with mild pulmonary hypertension.  Aortic Valve: The aortic valve is trileaflet. Peak transaortic gradient   equals 18.2 mmHg, mean transaortic gradient equals 6.2 mmHg, the   calculated aortic valve area equals 2.14 cm² by the continuity equation   consistent with mild aortic stenosis. The peak aortic velocity was   obtained from the apical view. Trivial aortic valve regurgitation is seen.  Pulmonic Valve: The pulmonic valve was not well visualized. Mild pulmonic   valve regurgitation.  Aorta: Aortic root measured at Sinus of Valsalva is normal. There is mild   aortic root calcification.  Venous: The inferior vena cava was normal sized, with respiratory size   variation greater than 50%.       Summary:   1. Left ventricular ejection fraction, by visual estimation, is 50 to   55%.   2. Technically limited study.   3. Normal global left ventricular systolic function.   4. Normal left ventricular internal cavity size.   5. The mitral in-flow pattern reveals no discernable A-wave, therefore   no comment on diastolic function can be made.   6. There is mild concentric left ventricular hypertrophy.   7. Normal right ventricular size and function.   8. There is no evidence of pericardial effusion.   9. Mild mitral annular calcification.  10. Mild mitral valve regurgitation.  11. Moderate thickening and calcification of the anterior and posterior   mitral valve leaflets.  12. Degenerative tricuspid valve.  13. Mild-moderate tricuspid regurgitation.  14. Estimated pulmonary artery systolic pressure is 43.4 mmHg assuming a   right atrial pressure of 5 mmHg, which is consistent with mild pulmonary   hypertension.  15. Peak transaortic gradient equals 18.2 mmHg, mean transaortic gradient   equals 6.2 mmHg, the calculated aortic valve area equals2.14 cm² by the   continuity equation consistent with mild aortic stenosis.  16. There is mild aortic root calcification.    Tk Gillette MD  Electronically signed on 2019 at 2:41:18 PM       < end of copied text >    ECG:   < from: 12 Lead ECG (19 @ 18:06) >    Ventricular Rate 91 BPM    Atrial Rate 227 BPM    QRS Duration 88 ms    Q-T Interval 370 ms    QTC Calculation(Bezet) 455 ms    R Axis -24 degrees    T Axis 147 degrees    Diagnosis Line Atrial fibrillation  Voltage criteria for left ventricular hypertrophy  ST & T wave abnormality, consider lateral ischemia  Abnormal ECG  No previous ECGs available  Confirmed by Tk Gillette MD ([id number]) on 8/10/2019 7:17:00 PM

## 2019-08-12 NOTE — PROGRESS NOTE ADULT - SUBJECTIVE AND OBJECTIVE BOX
Pt seen and examined at bedside. Pain appears well controlled.    Vital Signs Last 24 Hrs  T(C): 36.4 (12 Aug 2019 04:30), Max: 37.1 (11 Aug 2019 19:54)  T(F): 97.6 (12 Aug 2019 04:30), Max: 98.8 (11 Aug 2019 19:54)  HR: 89 (12 Aug 2019 05:30) (78 - 105)  BP: 185/82 (12 Aug 2019 05:00) (103/63 - 185/82)  BP(mean): 102 (12 Aug 2019 05:00) (69 - 103)  RR: 24 (12 Aug 2019 05:30) (16 - 41)  SpO2: 100% (12 Aug 2019 05:30) (89% - 100%)             PE  LLE:  Dressing C/D/I  Sensation unable to be assessed  + EHL/FHL/TA/GSC  + PT/DP pulses  Compartments soft, compressible

## 2019-08-12 NOTE — PHYSICAL THERAPY INITIAL EVALUATION ADULT - RANGE OF MOTION, PT EVAL
GOAL: pt will demonstrate L hip AROM WNL's in order to complete all functional mobility independently wtihin 8 weeks

## 2019-08-13 LAB
ANION GAP SERPL CALC-SCNC: 6 MMOL/L — SIGNIFICANT CHANGE UP (ref 5–17)
BUN SERPL-MCNC: 19 MG/DL — SIGNIFICANT CHANGE UP (ref 7–23)
CALCIUM SERPL-MCNC: 9.1 MG/DL — SIGNIFICANT CHANGE UP (ref 8.5–10.1)
CHLORIDE SERPL-SCNC: 100 MMOL/L — SIGNIFICANT CHANGE UP (ref 96–108)
CO2 SERPL-SCNC: 32 MMOL/L — HIGH (ref 22–31)
CREAT SERPL-MCNC: 0.48 MG/DL — LOW (ref 0.5–1.3)
GLUCOSE BLDC GLUCOMTR-MCNC: 112 MG/DL — HIGH (ref 70–99)
GLUCOSE BLDC GLUCOMTR-MCNC: 115 MG/DL — HIGH (ref 70–99)
GLUCOSE BLDC GLUCOMTR-MCNC: 170 MG/DL — HIGH (ref 70–99)
GLUCOSE BLDC GLUCOMTR-MCNC: 181 MG/DL — HIGH (ref 70–99)
GLUCOSE SERPL-MCNC: 116 MG/DL — HIGH (ref 70–99)
HCT VFR BLD CALC: 40 % — SIGNIFICANT CHANGE UP (ref 34.5–45)
HGB BLD-MCNC: 12.4 G/DL — SIGNIFICANT CHANGE UP (ref 11.5–15.5)
MAGNESIUM SERPL-MCNC: 2.3 MG/DL — SIGNIFICANT CHANGE UP (ref 1.6–2.6)
MCHC RBC-ENTMCNC: 28.7 PG — SIGNIFICANT CHANGE UP (ref 27–34)
MCHC RBC-ENTMCNC: 31 GM/DL — LOW (ref 32–36)
MCV RBC AUTO: 92.6 FL — SIGNIFICANT CHANGE UP (ref 80–100)
NRBC # BLD: 0 /100 WBCS — SIGNIFICANT CHANGE UP (ref 0–0)
PHOSPHATE SERPL-MCNC: 3 MG/DL — SIGNIFICANT CHANGE UP (ref 2.5–4.5)
PLATELET # BLD AUTO: 211 K/UL — SIGNIFICANT CHANGE UP (ref 150–400)
POTASSIUM SERPL-MCNC: 4.1 MMOL/L — SIGNIFICANT CHANGE UP (ref 3.5–5.3)
POTASSIUM SERPL-SCNC: 4.1 MMOL/L — SIGNIFICANT CHANGE UP (ref 3.5–5.3)
RBC # BLD: 4.32 M/UL — SIGNIFICANT CHANGE UP (ref 3.8–5.2)
RBC # FLD: 14.4 % — SIGNIFICANT CHANGE UP (ref 10.3–14.5)
SODIUM SERPL-SCNC: 138 MMOL/L — SIGNIFICANT CHANGE UP (ref 135–145)
WBC # BLD: 10.06 K/UL — SIGNIFICANT CHANGE UP (ref 3.8–10.5)
WBC # FLD AUTO: 10.06 K/UL — SIGNIFICANT CHANGE UP (ref 3.8–10.5)

## 2019-08-13 PROCEDURE — 99233 SBSQ HOSP IP/OBS HIGH 50: CPT

## 2019-08-13 RX ADMIN — Medication 81 MILLIGRAM(S): at 12:10

## 2019-08-13 RX ADMIN — APIXABAN 2.5 MILLIGRAM(S): 2.5 TABLET, FILM COATED ORAL at 06:05

## 2019-08-13 RX ADMIN — Medication 650 MILLIGRAM(S): at 13:45

## 2019-08-13 RX ADMIN — ATORVASTATIN CALCIUM 20 MILLIGRAM(S): 80 TABLET, FILM COATED ORAL at 22:02

## 2019-08-13 RX ADMIN — LOSARTAN POTASSIUM 25 MILLIGRAM(S): 100 TABLET, FILM COATED ORAL at 06:05

## 2019-08-13 RX ADMIN — APIXABAN 2.5 MILLIGRAM(S): 2.5 TABLET, FILM COATED ORAL at 17:20

## 2019-08-13 RX ADMIN — Medication 1: at 12:10

## 2019-08-13 RX ADMIN — Medication 650 MILLIGRAM(S): at 12:11

## 2019-08-13 NOTE — PROGRESS NOTE ADULT - ASSESSMENT
89y Female brought to the ED because of a left hip fracture and new onset Afib. She was having outpatient testing at a cardiologist and was noted to be in afib. No fever, no chills, no abd pain, no back pain, no nausea, no vomiting, no weakness, no headache, no neck pain, no weakness.  Found to have left femoral neck fracture, s/p pinning.    New onset A fib:  - Rate controlled  - Switched from heparin ggt to Eliquis  - 2D echo results noted  - Tele monitor  - Cardio follow up      Demand ischemia:  - Continue ASA, statin  Cardiology follow up      Left femoral neck fracture:  -S/p OR pinning  -[facility] on DC    HTN:  - Continue current med      DM:  - Insulin SS      HLD:  - Continue Lipitor.      DNR.    DC to [facility] once authorization complete.

## 2019-08-13 NOTE — PROGRESS NOTE ADULT - SUBJECTIVE AND OBJECTIVE BOX
Pt seen and examined at bedside. Pain appears well controlled.    Vital Signs Last 24 Hrs  T(C): 36.4 (13 Aug 2019 06:00), Max: 37.3 (12 Aug 2019 15:54)  T(F): 97.5 (13 Aug 2019 06:00), Max: 99.1 (12 Aug 2019 15:54)  HR: 104 (13 Aug 2019 06:00) (79 - 104)  BP: 149/96 (13 Aug 2019 06:00) (122/62 - 162/100)  BP(mean): 93 (12 Aug 2019 15:54) (84 - 107)  RR: 18 (13 Aug 2019 06:00) (16 - 32)  SpO2: 97% (13 Aug 2019 06:00) (86% - 99%)    PE  Gen: demented, alert    LLE:  Dressing saturated with serosanguinous drainage, intact  Sensation and motor strength unable to be assessed due to mental status  + EHL/FHL/TA/GSC; spontaneous moving all extremities  + PT/DP pulses  Compartments soft, compressible    Imaging:  Intra-op fluoroscopy - CRPP L hip with hardware in appropriate location extending into femoral head

## 2019-08-13 NOTE — PROGRESS NOTE ADULT - ASSESSMENT
A/P: 89F s/p L hip CMN POD#2    - Pain control  - PT/OT  - WBAT LLE  - DVT ppx; heparin drip per cards  - Medicine/Cardiology following  - Discharge planning; [facility]  - D/w attending Dr. Rasheed, will advise if plan changes

## 2019-08-13 NOTE — PROGRESS NOTE ADULT - SUBJECTIVE AND OBJECTIVE BOX
89y Female brought to the ED because of a left hip fracture and new onset Afib. She was having outpatient testing at a cardiologist and was noted to be in afib. No fever, no chills, no abd pain, no back pain, no nausea, no vomiting, no weakness, no headache, no neck pain, no weakness.  Found to have left femoral neck fracture, s/p pinning.    Today:  Pt offered no complaints.  Spoke with son regarding DC to [facility].    REVIEW OF SYSTEMS:  No fever/chills, No photophobia/eye pain/changes in vision,  No chest pain/palpitations, no SOB/cough/wheeze/stridor, No abdominal pain, No N/V/D, no dysuria/frequency/discharge, No neck/back pain, no rash, no changes in neurological status/function.      MEDICATIONS  (STANDING):  apixaban 2.5 milliGRAM(s) Oral every 12 hours  aspirin  chewable 81 milliGRAM(s) Oral daily  atorvastatin 20 milliGRAM(s) Oral at bedtime  dextrose 5%. 1000 milliLiter(s) (50 mL/Hr) IV Continuous <Continuous>  dextrose 50% Injectable 12.5 Gram(s) IV Push once  dextrose 50% Injectable 25 Gram(s) IV Push once  dextrose 50% Injectable 25 Gram(s) IV Push once  insulin lispro (HumaLOG) corrective regimen sliding scale   SubCutaneous three times a day before meals  insulin lispro (HumaLOG) corrective regimen sliding scale   SubCutaneous at bedtime  losartan 25 milliGRAM(s) Oral daily    MEDICATIONS  (PRN):  acetaminophen   Tablet .. 650 milliGRAM(s) Oral every 6 hours PRN Temp greater or equal to 38C (100.4F), Mild Pain (1 - 3)  ALBUTerol/ipratropium for Nebulization 3 milliLiter(s) Nebulizer every 6 hours PRN Shortness of Breath and/or Wheezing  dextrose 40% Gel 15 Gram(s) Oral once PRN Blood Glucose LESS THAN 70 milliGRAM(s)/deciLiter  glucagon  Injectable 1 milliGRAM(s) IntraMuscular once PRN Glucose <70 milliGRAM(s)/deciLiter      Allergies    No Known Allergies      Vital Signs Last 24 Hrs  T(C): 37.1 (13 Aug 2019 11:55), Max: 37.1 (12 Aug 2019 23:43)  T(F): 98.7 (13 Aug 2019 11:55), Max: 98.8 (12 Aug 2019 23:43)  HR: 101 (13 Aug 2019 11:55) (99 - 104)  BP: 130/72 (13 Aug 2019 11:55) (130/72 - 149/96)  BP(mean): --  RR: 18 (13 Aug 2019 11:55) (18 - 19)  SpO2: 95% (13 Aug 2019 11:55) (86% - 97%)    PHYSICAL EXAM:  General: NAD, awake alert  HEENT: MMM, EOMI  Neck: supple  Respiratory: CTA b/l, no rales, crackles  Cardiovascular: s1s2 irregular irregular rhythm  Abdomen: soft, non tender, non distended  Extremities: warm, no edema  Skin: dressing over left hip- CDI, no pain  Neurological: no focal deficits  Psychiatry: pleasant, alert and oriented x 2      LABS:                        12.4   10.06 )-----------( 211      ( 13 Aug 2019 07:45 )             40.0     08-13    138  |  100  |  19  ----------------------------<  116<H>  4.1   |  32<H>  |  0.48<L>    Ca    9.1      13 Aug 2019 07:45  Phos  3.0     08-13  Mg     2.3     08-13      PTT - ( 12 Aug 2019 15:12 )  PTT:58.7 sec

## 2019-08-14 LAB
ALBUMIN SERPL ELPH-MCNC: 2.6 G/DL — LOW (ref 3.3–5)
ALP SERPL-CCNC: 66 U/L — SIGNIFICANT CHANGE UP (ref 40–120)
ALT FLD-CCNC: 29 U/L — SIGNIFICANT CHANGE UP (ref 12–78)
ANION GAP SERPL CALC-SCNC: 7 MMOL/L — SIGNIFICANT CHANGE UP (ref 5–17)
AST SERPL-CCNC: 31 U/L — SIGNIFICANT CHANGE UP (ref 15–37)
BASOPHILS # BLD AUTO: 0.06 K/UL — SIGNIFICANT CHANGE UP (ref 0–0.2)
BASOPHILS NFR BLD AUTO: 0.7 % — SIGNIFICANT CHANGE UP (ref 0–2)
BILIRUB SERPL-MCNC: 0.6 MG/DL — SIGNIFICANT CHANGE UP (ref 0.2–1.2)
BUN SERPL-MCNC: 15 MG/DL — SIGNIFICANT CHANGE UP (ref 7–23)
CALCIUM SERPL-MCNC: 8.7 MG/DL — SIGNIFICANT CHANGE UP (ref 8.5–10.1)
CHLORIDE SERPL-SCNC: 102 MMOL/L — SIGNIFICANT CHANGE UP (ref 96–108)
CO2 SERPL-SCNC: 29 MMOL/L — SIGNIFICANT CHANGE UP (ref 22–31)
CREAT SERPL-MCNC: 0.45 MG/DL — LOW (ref 0.5–1.3)
EOSINOPHIL # BLD AUTO: 0.24 K/UL — SIGNIFICANT CHANGE UP (ref 0–0.5)
EOSINOPHIL NFR BLD AUTO: 2.7 % — SIGNIFICANT CHANGE UP (ref 0–6)
GLUCOSE BLDC GLUCOMTR-MCNC: 123 MG/DL — HIGH (ref 70–99)
GLUCOSE BLDC GLUCOMTR-MCNC: 132 MG/DL — HIGH (ref 70–99)
GLUCOSE BLDC GLUCOMTR-MCNC: 157 MG/DL — HIGH (ref 70–99)
GLUCOSE BLDC GLUCOMTR-MCNC: 167 MG/DL — HIGH (ref 70–99)
GLUCOSE SERPL-MCNC: 124 MG/DL — HIGH (ref 70–99)
HCT VFR BLD CALC: 41.2 % — SIGNIFICANT CHANGE UP (ref 34.5–45)
HGB BLD-MCNC: 13 G/DL — SIGNIFICANT CHANGE UP (ref 11.5–15.5)
IMM GRANULOCYTES NFR BLD AUTO: 0.9 % — SIGNIFICANT CHANGE UP (ref 0–1.5)
LYMPHOCYTES # BLD AUTO: 1.34 K/UL — SIGNIFICANT CHANGE UP (ref 1–3.3)
LYMPHOCYTES # BLD AUTO: 15 % — SIGNIFICANT CHANGE UP (ref 13–44)
MCHC RBC-ENTMCNC: 29 PG — SIGNIFICANT CHANGE UP (ref 27–34)
MCHC RBC-ENTMCNC: 31.6 GM/DL — LOW (ref 32–36)
MCV RBC AUTO: 91.8 FL — SIGNIFICANT CHANGE UP (ref 80–100)
MONOCYTES # BLD AUTO: 1.14 K/UL — HIGH (ref 0–0.9)
MONOCYTES NFR BLD AUTO: 12.8 % — SIGNIFICANT CHANGE UP (ref 2–14)
NEUTROPHILS # BLD AUTO: 6.06 K/UL — SIGNIFICANT CHANGE UP (ref 1.8–7.4)
NEUTROPHILS NFR BLD AUTO: 67.9 % — SIGNIFICANT CHANGE UP (ref 43–77)
NRBC # BLD: 0 /100 WBCS — SIGNIFICANT CHANGE UP (ref 0–0)
PLATELET # BLD AUTO: 146 K/UL — LOW (ref 150–400)
POTASSIUM SERPL-MCNC: 4.1 MMOL/L — SIGNIFICANT CHANGE UP (ref 3.5–5.3)
POTASSIUM SERPL-SCNC: 4.1 MMOL/L — SIGNIFICANT CHANGE UP (ref 3.5–5.3)
PROT SERPL-MCNC: 6.6 GM/DL — SIGNIFICANT CHANGE UP (ref 6–8.3)
RBC # BLD: 4.49 M/UL — SIGNIFICANT CHANGE UP (ref 3.8–5.2)
RBC # FLD: 14.6 % — HIGH (ref 10.3–14.5)
SODIUM SERPL-SCNC: 138 MMOL/L — SIGNIFICANT CHANGE UP (ref 135–145)
WBC # BLD: 8.92 K/UL — SIGNIFICANT CHANGE UP (ref 3.8–10.5)
WBC # FLD AUTO: 8.92 K/UL — SIGNIFICANT CHANGE UP (ref 3.8–10.5)

## 2019-08-14 PROCEDURE — 99232 SBSQ HOSP IP/OBS MODERATE 35: CPT

## 2019-08-14 RX ORDER — POLYETHYLENE GLYCOL 3350 17 G/17G
17 POWDER, FOR SOLUTION ORAL
Refills: 0 | Status: DISCONTINUED | OUTPATIENT
Start: 2019-08-14 | End: 2019-08-15

## 2019-08-14 RX ORDER — SENNA PLUS 8.6 MG/1
2 TABLET ORAL AT BEDTIME
Refills: 0 | Status: DISCONTINUED | OUTPATIENT
Start: 2019-08-14 | End: 2019-08-15

## 2019-08-14 RX ORDER — LOSARTAN POTASSIUM 100 MG/1
25 TABLET, FILM COATED ORAL DAILY
Refills: 0 | Status: DISCONTINUED | OUTPATIENT
Start: 2019-08-15 | End: 2019-08-15

## 2019-08-14 RX ORDER — DOCUSATE SODIUM 100 MG
100 CAPSULE ORAL THREE TIMES A DAY
Refills: 0 | Status: DISCONTINUED | OUTPATIENT
Start: 2019-08-14 | End: 2019-08-15

## 2019-08-14 RX ORDER — METOPROLOL TARTRATE 50 MG
12.5 TABLET ORAL
Refills: 0 | Status: DISCONTINUED | OUTPATIENT
Start: 2019-08-14 | End: 2019-08-15

## 2019-08-14 RX ADMIN — ATORVASTATIN CALCIUM 20 MILLIGRAM(S): 80 TABLET, FILM COATED ORAL at 21:05

## 2019-08-14 RX ADMIN — APIXABAN 2.5 MILLIGRAM(S): 2.5 TABLET, FILM COATED ORAL at 05:07

## 2019-08-14 RX ADMIN — SENNA PLUS 2 TABLET(S): 8.6 TABLET ORAL at 21:05

## 2019-08-14 RX ADMIN — Medication 81 MILLIGRAM(S): at 11:38

## 2019-08-14 RX ADMIN — LOSARTAN POTASSIUM 25 MILLIGRAM(S): 100 TABLET, FILM COATED ORAL at 05:07

## 2019-08-14 RX ADMIN — Medication 1: at 11:37

## 2019-08-14 RX ADMIN — APIXABAN 2.5 MILLIGRAM(S): 2.5 TABLET, FILM COATED ORAL at 17:07

## 2019-08-14 RX ADMIN — Medication 100 MILLIGRAM(S): at 21:05

## 2019-08-14 RX ADMIN — Medication 1: at 17:07

## 2019-08-14 RX ADMIN — Medication 12.5 MILLIGRAM(S): at 21:01

## 2019-08-14 RX ADMIN — POLYETHYLENE GLYCOL 3350 17 GRAM(S): 17 POWDER, FOR SOLUTION ORAL at 17:07

## 2019-08-14 NOTE — PROGRESS NOTE ADULT - ASSESSMENT
89 year old female was brought to the ED because of a left hip fracture and new onset Afib. She fell 5 days ago and had outpatient testing and was at a cardiologist today and was noted to be in afib.    89 yo female with admitted s/p fall found to have left femoral fracture. Pt hospital course complicated by new onset afib on anticoagulation.  Pt underwent surgery this morning however became profoundly hypotensive intra-op requiring multiple push dose pressors with resolution.  89 yo female with left hip fracture, and likely new onset AFib.  s/o ortho surgery, became hypotensive post op but otherwise has been asymptomatic.  On aspirin and  IV heparin  Infusion, if no contra-indication, will change to DOAC.  To continue f/u with Dr. Jose Starkey, her PCP. 89 yo female with admitted s/p fall found to have left femoral fracture. Pt hospital course complicated by new onset afib on anticoagulation.  Pt underwent surgery this morning however became profoundly hypotensive intra-op requiring multiple push dose pressors with resolution. post op but otherwise has been asymptomatic.  On aspirin and  IV heparin  Infusion, if no contra-indication, will change to DOAC.  To continue f/u with Dr. Jose Starkey, her PCP. 89 yo female with PMH: HTN, CVA, HLD, DM, was brought to the ED because of left hip fracture and new onset Afib. She fell 5 days ago and had outpatient testing and was at a cardiologist and was noted to be in afib.   ECho: EF 50-55%, mild MR/AS/pHTN, mild-mod TR    ·	Left femoral neck fracture   -s/p L hip CMN   -PT  -Pain control  ·	New onset Afib  -On Eliquis  2.5 mg bid for AC  ·	HTN  -cont with losartan 25 mg daily  ·	HLD  - cont with statin - Lipitor 20 mg daily     DNR status  Pt to continue f/u with Dr. Jose Starkey, her PCP. 89 yo female with PMH: HTN, CVA, HLD, DM, was brought to the ED because of left hip fracture and new onset Afib. She fell 5 days ago and had outpatient testing and was at a cardiologist and was noted to be in afib.   ECho: EF 50-55%, mild MR/AS/pHTN, mild-mod TR    ·	Left femoral neck fracture   -s/p L hip CMN   -PT  -Pain control  ·	New onset Afib  -On Eliquis  2.5 mg bid for AC  -will add low dose metoprolol 12 mg bid , monitor on tele  ·	HTN  -cont with losartan 25 mg daily  ·	HLD  - cont with statin - Lipitor 20 mg daily     DNR status  Pt to continue f/u with Dr. Jose Starkey, her PCP.

## 2019-08-14 NOTE — PROGRESS NOTE ADULT - ASSESSMENT
A/P: 89F s/p L hip CMN POD#23  - Pain control  - PT/OT  - WBAT LLE  - DVT ppx--eliquis  - Medicine/Cardiology following  - Discharge planning; [facility]  - D/w attending Dr. Rasheed, will advise if plan changes A/P: 89F s/p L hip CMN POD#3  - Pain control  - PT/OT  - WBAT LLE  - DVT ppx--eliquis  - Medicine/Cardiology following  - Discharge planning; [facility]  - D/w attending Dr. Rasheed, will advise if plan changes A/P: 89F s/p L hip CMN POD#3  - Pain control  - PT/OT  - WBAT LLE  - DVT ppx--eliquis  - Medicine/Cardiology following  - Discharge planning; [facility]  - No further orthopedic intervention  - Dressing changes PRN  - F/U with Dr. Rasheed as outlined in DC instructions  - Orthopedic stable for DC  - D/w attending Dr. Rasheed, will advise if plan changes

## 2019-08-14 NOTE — PROGRESS NOTE ADULT - SUBJECTIVE AND OBJECTIVE BOX
INTERVAL HPI/OVERNIGHT EVENTS:  Pt seen and examined at bedside.     Allergies/Intolerance: No Known Allergies      MEDICATIONS  (STANDING):  apixaban 2.5 milliGRAM(s) Oral every 12 hours  aspirin  chewable 81 milliGRAM(s) Oral daily  atorvastatin 20 milliGRAM(s) Oral at bedtime  dextrose 5%. 1000 milliLiter(s) (50 mL/Hr) IV Continuous <Continuous>  dextrose 50% Injectable 12.5 Gram(s) IV Push once  dextrose 50% Injectable 25 Gram(s) IV Push once  dextrose 50% Injectable 25 Gram(s) IV Push once  insulin lispro (HumaLOG) corrective regimen sliding scale   SubCutaneous three times a day before meals  insulin lispro (HumaLOG) corrective regimen sliding scale   SubCutaneous at bedtime  losartan 25 milliGRAM(s) Oral daily    MEDICATIONS  (PRN):  acetaminophen   Tablet .. 650 milliGRAM(s) Oral every 6 hours PRN Temp greater or equal to 38C (100.4F), Mild Pain (1 - 3)  ALBUTerol/ipratropium for Nebulization 3 milliLiter(s) Nebulizer every 6 hours PRN Shortness of Breath and/or Wheezing  dextrose 40% Gel 15 Gram(s) Oral once PRN Blood Glucose LESS THAN 70 milliGRAM(s)/deciLiter  glucagon  Injectable 1 milliGRAM(s) IntraMuscular once PRN Glucose <70 milliGRAM(s)/deciLiter        ROS: all systems reviewed and wnl      PHYSICAL EXAMINATION:  Vital Signs Last 24 Hrs  T(C): 37.3 (14 Aug 2019 11:50), Max: 37.3 (14 Aug 2019 11:50)  T(F): 99.2 (14 Aug 2019 11:50), Max: 99.2 (14 Aug 2019 11:50)  HR: 103 (14 Aug 2019 11:50) (65 - 107)  BP: 168/78 (14 Aug 2019 11:50) (135/69 - 172/88)  BP(mean): 99 (13 Aug 2019 17:09) (99 - 99)  RR: 17 (14 Aug 2019 11:50) (17 - 18)  SpO2: 93% (14 Aug 2019 11:50) (69% - 97%)  CAPILLARY BLOOD GLUCOSE      POCT Blood Glucose.: 157 mg/dL (14 Aug 2019 11:08)  POCT Blood Glucose.: 132 mg/dL (14 Aug 2019 08:06)  POCT Blood Glucose.: 170 mg/dL (13 Aug 2019 21:45)  POCT Blood Glucose.: 112 mg/dL (13 Aug 2019 16:39)      08-13 @ 07:01 - 08-14 @ 07:00  --------------------------------------------------------  IN: 0 mL / OUT: 1000 mL / NET: -1000 mL    08-14 @ 07:01 - 08-14 @ 13:24  --------------------------------------------------------  IN: 120 mL / OUT: 0 mL / NET: 120 mL        GENERAL:   NECK: supple, No JVD  CHEST/LUNG: clear to auscultation bilaterally; no rales, rhonchi, or wheezing b/l  HEART: normal S1, S2  ABDOMEN: BS+, soft, ND, NT   EXTREMITIES:  pulses palpable; no clubbing, cyanosis, or edema b/l LEs  SKIN: no rashes or lesions      LABS:                        13.0   8.92  )-----------( 146      ( 14 Aug 2019 07:46 )             41.2     08-14    138  |  102  |  15  ----------------------------<  124<H>  4.1   |  29  |  0.45<L>    Ca    8.7      14 Aug 2019 07:46  Phos  3.0     08-13  Mg     2.3     08-13    TPro  6.6  /  Alb  2.6<L>  /  TBili  0.6  /  DBili  x   /  AST  31  /  ALT  29  /  AlkPhos  66  08-14    PTT - ( 12 Aug 2019 15:12 )  PTT:58.7 sec INTERVAL HPI/OVERNIGHT EVENTS:  Pt seen and examined at bedside.     Allergies/Intolerance: No Known Allergies      MEDICATIONS  (STANDING):  apixaban 2.5 milliGRAM(s) Oral every 12 hours  aspirin  chewable 81 milliGRAM(s) Oral daily  atorvastatin 20 milliGRAM(s) Oral at bedtime  dextrose 5%. 1000 milliLiter(s) (50 mL/Hr) IV Continuous <Continuous>  dextrose 50% Injectable 12.5 Gram(s) IV Push once  dextrose 50% Injectable 25 Gram(s) IV Push once  dextrose 50% Injectable 25 Gram(s) IV Push once  insulin lispro (HumaLOG) corrective regimen sliding scale   SubCutaneous three times a day before meals  insulin lispro (HumaLOG) corrective regimen sliding scale   SubCutaneous at bedtime  losartan 25 milliGRAM(s) Oral daily    MEDICATIONS  (PRN):  acetaminophen   Tablet .. 650 milliGRAM(s) Oral every 6 hours PRN Temp greater or equal to 38C (100.4F), Mild Pain (1 - 3)  ALBUTerol/ipratropium for Nebulization 3 milliLiter(s) Nebulizer every 6 hours PRN Shortness of Breath and/or Wheezing  dextrose 40% Gel 15 Gram(s) Oral once PRN Blood Glucose LESS THAN 70 milliGRAM(s)/deciLiter  glucagon  Injectable 1 milliGRAM(s) IntraMuscular once PRN Glucose <70 milliGRAM(s)/deciLiter        ROS: all systems reviewed and wnl      PHYSICAL EXAMINATION:  Vital Signs Last 24 Hrs  T(C): 37.3 (14 Aug 2019 11:50), Max: 37.3 (14 Aug 2019 11:50)  T(F): 99.2 (14 Aug 2019 11:50), Max: 99.2 (14 Aug 2019 11:50)  HR: 103 (14 Aug 2019 11:50) (65 - 107)  BP: 168/78 (14 Aug 2019 11:50) (135/69 - 172/88)  BP(mean): 99 (13 Aug 2019 17:09) (99 - 99)  RR: 17 (14 Aug 2019 11:50) (17 - 18)  SpO2: 93% (14 Aug 2019 11:50) (69% - 97%)  CAPILLARY BLOOD GLUCOSE      POCT Blood Glucose.: 157 mg/dL (14 Aug 2019 11:08)  POCT Blood Glucose.: 132 mg/dL (14 Aug 2019 08:06)  POCT Blood Glucose.: 170 mg/dL (13 Aug 2019 21:45)  POCT Blood Glucose.: 112 mg/dL (13 Aug 2019 16:39)      08-13 @ 07:01 - 08-14 @ 07:00  --------------------------------------------------------  IN: 0 mL / OUT: 1000 mL / NET: -1000 mL    08-14 @ 07:01 - 08-14 @ 13:24  --------------------------------------------------------  IN: 120 mL / OUT: 0 mL / NET: 120 mL        GENERAL: stable in chair, comofortable, NAD, no fevers  NECK: supple, No JVD  CHEST/LUNG: clear to auscultation bilaterally; no rales, rhonchi, or wheezing b/l  HEART: normal S1, S2  ABDOMEN: BS+, soft, ND, NT   EXTREMITIES:  pulses palpable; no clubbing, cyanosis, or edema b/l LEs  SKIN: no rashes or lesions      LABS:                        13.0   8.92  )-----------( 146      ( 14 Aug 2019 07:46 )             41.2     08-14    138  |  102  |  15  ----------------------------<  124<H>  4.1   |  29  |  0.45<L>    Ca    8.7      14 Aug 2019 07:46  Phos  3.0     08-13  Mg     2.3     08-13    TPro  6.6  /  Alb  2.6<L>  /  TBili  0.6  /  DBili  x   /  AST  31  /  ALT  29  /  AlkPhos  66  08-14    PTT - ( 12 Aug 2019 15:12 )  PTT:58.7 sec

## 2019-08-14 NOTE — PROGRESS NOTE ADULT - SUBJECTIVE AND OBJECTIVE BOX
Pt seen and examined at bedside. Pain appears well controlled.    Vital Signs Last 24 Hrs  T(C): 36.8 (14 Aug 2019 04:48), Max: 37.1 (13 Aug 2019 11:55)  T(F): 98.3 (14 Aug 2019 04:48), Max: 98.8 (13 Aug 2019 17:09)  HR: 107 (14 Aug 2019 04:48) (87 - 107)  BP: 147/83 (14 Aug 2019 04:48) (130/72 - 172/88)  BP(mean): 99 (13 Aug 2019 17:09) (99 - 99)  RR: 17 (14 Aug 2019 04:48) (17 - 18)  SpO2: 97% (14 Aug 2019 04:48) (93% - 97%)    PE  Gen: demented, alert    LLE:  Dressing saturated with serosanguinous drainage, intact  Sensation and motor strength unable to be assessed due to mental status  + EHL/FHL/TA/GSC; spontaneous moving all extremities  + PT/DP pulses  Compartments soft, compressible

## 2019-08-14 NOTE — PROGRESS NOTE ADULT - ASSESSMENT
89y Female brought to the ED because of a left hip fracture and new onset Afib. She was having outpatient testing at a cardiologist and was noted to be in afib. No fever, no chills, no abd pain, no back pain, no nausea, no vomiting, no weakness, no headache, no neck pain, no weakness.  Found to have left femoral neck fracture, s/p pinning.    New onset A fib:  - Rate controlled, switched from heparin ggt to Eliquis 2.5 mg bid  - 2D echo results noted  - Tele monitor, cardio follow up      Demand ischemia:  - Continue ASA 81 mg/day and Lipitor 20 mg/day  Cardiology follow up      Left femoral neck fracture:  -S/p OR pinning  -[facility] on DC    HTN:  - Continue current med      DM:  - Insulin SS      HLD:  - Continue Lipitor.      DNR.    DC to [facility] once authorization complete. 89y Female brought to the ED because of a left hip fracture and new onset Afib. She was having outpatient testing at a cardiologist and was noted to be in afib. No fever, no chills, no abd pain, no back pain, no nausea, no vomiting, no weakness, no headache, no neck pain, no weakness. Found to have left femoral neck fracture, s/p pinning.    New onset A fib:  - Rate controlled, switched from heparin ggt to Eliquis 2.5 mg bid  - 2D echo results noted, Tele monitor, cardio follow up      Demand ischemia:  - Stop ASA, no indication. Continue Lipitor 20 mg/day for HLD.       Left femoral neck fracture:  -S/p OR pinning, POD 3. [facility] on DC    HTN:  - Continue current meds of Cozaar 25 mg/day.       DM:  - Insulin SS      HLD:  - Continue Lipitor 20 mg/day.       DNR.    DC to [facility] once authorization complete, likely Tuesday.

## 2019-08-14 NOTE — PROGRESS NOTE ADULT - SUBJECTIVE AND OBJECTIVE BOX
Patient is a 89y old  Female who presents with a chief complaint of fall, A fib (14 Aug 2019 13:24)    PAST MEDICAL & SURGICAL HISTORY:  Hyperlipidemia  DM (diabetes mellitus)  HTN (hypertension)  No significant past surgical history    INTERVAL HISTORY: resting in bed, not in any acute distress   	  MEDICATIONS:  MEDICATIONS  (STANDING):  apixaban 2.5 milliGRAM(s) Oral every 12 hours  atorvastatin 20 milliGRAM(s) Oral at bedtime  dextrose 5%. 1000 milliLiter(s) (50 mL/Hr) IV Continuous <Continuous>  dextrose 50% Injectable 12.5 Gram(s) IV Push once  dextrose 50% Injectable 25 Gram(s) IV Push once  dextrose 50% Injectable 25 Gram(s) IV Push once  docusate sodium 100 milliGRAM(s) Oral three times a day  insulin lispro (HumaLOG) corrective regimen sliding scale   SubCutaneous three times a day before meals  insulin lispro (HumaLOG) corrective regimen sliding scale   SubCutaneous at bedtime  losartan 25 milliGRAM(s) Oral daily  polyethylene glycol 3350 17 Gram(s) Oral two times a day  senna 2 Tablet(s) Oral at bedtime    MEDICATIONS  (PRN):  acetaminophen   Tablet .. 650 milliGRAM(s) Oral every 6 hours PRN Temp greater or equal to 38C (100.4F), Mild Pain (1 - 3)  dextrose 40% Gel 15 Gram(s) Oral once PRN Blood Glucose LESS THAN 70 milliGRAM(s)/deciLiter  glucagon  Injectable 1 milliGRAM(s) IntraMuscular once PRN Glucose <70 milliGRAM(s)/deciLiter    Vitals:  T(F): 98.4 (08-14-19 @ 17:44), Max: 99.2 (08-14-19 @ 11:50)  HR: 96 (08-14-19 @ 17:44) (65 - 107)  BP: 157/73 (08-14-19 @ 17:44) (135/69 - 168/78)  RR: 17 (08-14-19 @ 17:44) (17 - 18)  SpO2: 96% (08-14-19 @ 17:44) (69% - 97%)    08-13 @ 07:01  -  08-14 @ 07:00  --------------------------------------------------------  IN:  Total IN: 0 mL    OUT:    Voided: 1000 mL  Total OUT: 1000 mL    Total NET: -1000 mL    08-14 @ 07:01  -  08-14 @ 17:56  --------------------------------------------------------  IN:    Oral Fluid: 120 mL  Total IN: 120 mL    OUT:  Total OUT: 0 mL    Total NET: 120 mL    PHYSICAL EXAM:  Neuro: Awake, responsive  CV: S1 S2 irregular  Lungs: CTABL  GI: Soft, BS +, ND, NT  Extremities: No edema     RADIOLOGY: < from: Xray Chest 1 View-PORTABLE IMMEDIATE (08.09.19 @ 17:56) >   No acute congestive changes are seen.     Septal thickening is noted along with prominence of interstitial   markings. Interstitial lung disease is noted on 12/21/2018.    No pleural fluid is evident.     The osseous structures are grossly intact.    IMPRESSION:    Cardiomegaly. Chronic interstitial lung disease suggested in both lungs.     < end of copied text >    DIAGNOSTIC TESTING:    [x ] Echocardiogram: < from: TTE Echo Doppler w/o Cont (08.10.19 @ 12:30) >   1. Left ventricular ejection fraction, by visual estimation, is 50 to   55%.   2. Technically limited study.   3. Normal global left ventricular systolic function.   4. Normal left ventricular internal cavity size.   5. The mitral in-flow pattern reveals no discernable A-wave, therefore   no comment on diastolic function can be made.   6. There is mild concentric left ventricular hypertrophy.   7. Normal right ventricular size and function.   8. There is no evidence of pericardial effusion.   9. Mild mitral annular calcification.  10. Mild mitral valve regurgitation.  11. Moderate thickening and calcification of the anterior and posterior   mitral valve leaflets.  12. Degenerative tricuspid valve.  13. Mild-moderate tricuspid regurgitation.  14. Estimated pulmonary artery systolic pressure is 43.4 mmHg assuming a   right atrial pressure of 5 mmHg, which is consistent with mild pulmonary   hypertension.  15. Peak transaortic gradient equals 18.2 mmHg, mean transaortic gradient   equals 6.2 mmHg, the calculated aortic valve area equals2.14 cm² by the   continuity equation consistent with mild aortic stenosis.  16. There is mild aortic root calcification.    < end of copied text >    LABS:	 	    14 Aug 2019 07:46    138    |  102    |  15     ----------------------------<  124    4.1     |  29     |  0.45   13 Aug 2019 07:45    138    |  100    |  19     ----------------------------<  116    4.1     |  32     |  0.48   12 Aug 2019 07:02    140    |  104    |  28     ----------------------------<  175    4.5     |  30     |  0.64     Ca    8.7        14 Aug 2019 07:46  Phos  3.0       13 Aug 2019 07:45  Mg     2.3       13 Aug 2019 07:45    TPro  6.6    /  Alb  2.6    /  TBili  0.6    /  DBili  x      /  AST  31     /  ALT  29     /  AlkPhos  66     14 Aug 2019 07:46                        13.0   8.92  )-----------( 146      ( 14 Aug 2019 07:46 )             41.2 ,                       12.4   10.06 )-----------( 211      ( 13 Aug 2019 07:45 )             40.0 ,                       12.7   10.42 )-----------( 207      ( 12 Aug 2019 07:02 )             41.8 ,                       12.9   8.72  )-----------( 227      ( 12 Aug 2019 00:27 )             41.7 Patient is a 89y old  Female who presents with a chief complaint of fall, A fib (14 Aug 2019 13:24)    PAST MEDICAL & SURGICAL HISTORY:  Hyperlipidemia  DM (diabetes mellitus)  HTN (hypertension)  No significant past surgical history    INTERVAL HISTORY: resting in bed, not in any acute distress   	  MEDICATIONS:  MEDICATIONS  (STANDING):  apixaban 2.5 milliGRAM(s) Oral every 12 hours  atorvastatin 20 milliGRAM(s) Oral at bedtime  dextrose 5%. 1000 milliLiter(s) (50 mL/Hr) IV Continuous <Continuous>  dextrose 50% Injectable 12.5 Gram(s) IV Push once  dextrose 50% Injectable 25 Gram(s) IV Push once  dextrose 50% Injectable 25 Gram(s) IV Push once  docusate sodium 100 milliGRAM(s) Oral three times a day  insulin lispro (HumaLOG) corrective regimen sliding scale   SubCutaneous three times a day before meals  insulin lispro (HumaLOG) corrective regimen sliding scale   SubCutaneous at bedtime  losartan 25 milliGRAM(s) Oral daily  polyethylene glycol 3350 17 Gram(s) Oral two times a day  senna 2 Tablet(s) Oral at bedtime    MEDICATIONS  (PRN):  acetaminophen   Tablet .. 650 milliGRAM(s) Oral every 6 hours PRN Temp greater or equal to 38C (100.4F), Mild Pain (1 - 3)  dextrose 40% Gel 15 Gram(s) Oral once PRN Blood Glucose LESS THAN 70 milliGRAM(s)/deciLiter  glucagon  Injectable 1 milliGRAM(s) IntraMuscular once PRN Glucose <70 milliGRAM(s)/deciLiter    Vitals:  T(F): 98.4 (08-14-19 @ 17:44), Max: 99.2 (08-14-19 @ 11:50)  HR: 96 (08-14-19 @ 17:44) (65 - 107)  BP: 157/73 (08-14-19 @ 17:44) (135/69 - 168/78)  RR: 17 (08-14-19 @ 17:44) (17 - 18)  SpO2: 96% (08-14-19 @ 17:44) (69% - 97%)    08-13 @ 07:01  -  08-14 @ 07:00  --------------------------------------------------------  IN:  Total IN: 0 mL    OUT:    Voided: 1000 mL  Total OUT: 1000 mL    Total NET: -1000 mL    08-14 @ 07:01  -  08-14 @ 17:56  --------------------------------------------------------  IN:    Oral Fluid: 120 mL  Total IN: 120 mL    OUT:  Total OUT: 0 mL    Total NET: 120 mL    PHYSICAL EXAM:  Neuro: Awake, responsive, pleasantly confused    CV: S1 S2 irregular, + SM  Lungs: CTABL  GI: Soft, BS +, ND, NT  Extremities: Lt hip dressing intact      RADIOLOGY: < from: Xray Chest 1 View-PORTABLE IMMEDIATE (08.09.19 @ 17:56) >   No acute congestive changes are seen.     Septal thickening is noted along with prominence of interstitial   markings. Interstitial lung disease is noted on 12/21/2018.    No pleural fluid is evident.     The osseous structures are grossly intact.    IMPRESSION:    Cardiomegaly. Chronic interstitial lung disease suggested in both lungs.     < end of copied text >    DIAGNOSTIC TESTING:    [x ] Echocardiogram: < from: TTE Echo Doppler w/o Cont (08.10.19 @ 12:30) >   1. Left ventricular ejection fraction, by visual estimation, is 50 to   55%.   2. Technically limited study.   3. Normal global left ventricular systolic function.   4. Normal left ventricular internal cavity size.   5. The mitral in-flow pattern reveals no discernable A-wave, therefore   no comment on diastolic function can be made.   6. There is mild concentric left ventricular hypertrophy.   7. Normal right ventricular size and function.   8. There is no evidence of pericardial effusion.   9. Mild mitral annular calcification.  10. Mild mitral valve regurgitation.  11. Moderate thickening and calcification of the anterior and posterior   mitral valve leaflets.  12. Degenerative tricuspid valve.  13. Mild-moderate tricuspid regurgitation.  14. Estimated pulmonary artery systolic pressure is 43.4 mmHg assuming a   right atrial pressure of 5 mmHg, which is consistent with mild pulmonary   hypertension.  15. Peak transaortic gradient equals 18.2 mmHg, mean transaortic gradient   equals 6.2 mmHg, the calculated aortic valve area equals2.14 cm² by the   continuity equation consistent with mild aortic stenosis.  16. There is mild aortic root calcification.    < end of copied text >    LABS:	 	    14 Aug 2019 07:46    138    |  102    |  15     ----------------------------<  124    4.1     |  29     |  0.45   13 Aug 2019 07:45    138    |  100    |  19     ----------------------------<  116    4.1     |  32     |  0.48   12 Aug 2019 07:02    140    |  104    |  28     ----------------------------<  175    4.5     |  30     |  0.64     Ca    8.7        14 Aug 2019 07:46  Phos  3.0       13 Aug 2019 07:45  Mg     2.3       13 Aug 2019 07:45    TPro  6.6    /  Alb  2.6    /  TBili  0.6    /  DBili  x      /  AST  31     /  ALT  29     /  AlkPhos  66     14 Aug 2019 07:46                        13.0   8.92  )-----------( 146      ( 14 Aug 2019 07:46 )             41.2 ,                       12.4   10.06 )-----------( 211      ( 13 Aug 2019 07:45 )             40.0 ,                       12.7   10.42 )-----------( 207      ( 12 Aug 2019 07:02 )             41.8 ,                       12.9   8.72  )-----------( 227      ( 12 Aug 2019 00:27 )             41.7

## 2019-08-15 VITALS
HEART RATE: 86 BPM | SYSTOLIC BLOOD PRESSURE: 131 MMHG | OXYGEN SATURATION: 95 % | RESPIRATION RATE: 19 BRPM | DIASTOLIC BLOOD PRESSURE: 67 MMHG

## 2019-08-15 LAB
GLUCOSE BLDC GLUCOMTR-MCNC: 233 MG/DL — HIGH (ref 70–99)
HCT VFR BLD CALC: 41.5 % — SIGNIFICANT CHANGE UP (ref 34.5–45)
HGB BLD-MCNC: 12.9 G/DL — SIGNIFICANT CHANGE UP (ref 11.5–15.5)
MCHC RBC-ENTMCNC: 28.7 PG — SIGNIFICANT CHANGE UP (ref 27–34)
MCHC RBC-ENTMCNC: 31.1 GM/DL — LOW (ref 32–36)
MCV RBC AUTO: 92.2 FL — SIGNIFICANT CHANGE UP (ref 80–100)
NRBC # BLD: 0 /100 WBCS — SIGNIFICANT CHANGE UP (ref 0–0)
PLATELET # BLD AUTO: 235 K/UL — SIGNIFICANT CHANGE UP (ref 150–400)
RBC # BLD: 4.5 M/UL — SIGNIFICANT CHANGE UP (ref 3.8–5.2)
RBC # FLD: 14.5 % — SIGNIFICANT CHANGE UP (ref 10.3–14.5)
WBC # BLD: 11.09 K/UL — HIGH (ref 3.8–10.5)
WBC # FLD AUTO: 11.09 K/UL — HIGH (ref 3.8–10.5)

## 2019-08-15 PROCEDURE — 99232 SBSQ HOSP IP/OBS MODERATE 35: CPT

## 2019-08-15 RX ORDER — ACETAMINOPHEN 500 MG
2 TABLET ORAL
Qty: 0 | Refills: 0 | DISCHARGE
Start: 2019-08-15

## 2019-08-15 RX ORDER — APIXABAN 2.5 MG/1
1 TABLET, FILM COATED ORAL
Qty: 0 | Refills: 0 | DISCHARGE
Start: 2019-08-15

## 2019-08-15 RX ORDER — POLYETHYLENE GLYCOL 3350 17 G/17G
17 POWDER, FOR SOLUTION ORAL
Qty: 0 | Refills: 0 | DISCHARGE
Start: 2019-08-15

## 2019-08-15 RX ORDER — SENNA PLUS 8.6 MG/1
2 TABLET ORAL
Qty: 0 | Refills: 0 | DISCHARGE
Start: 2019-08-15

## 2019-08-15 RX ORDER — METOPROLOL TARTRATE 50 MG
0 TABLET ORAL
Qty: 0 | Refills: 0 | DISCHARGE
Start: 2019-08-15

## 2019-08-15 RX ORDER — DOCUSATE SODIUM 100 MG
1 CAPSULE ORAL
Qty: 0 | Refills: 0 | DISCHARGE
Start: 2019-08-15

## 2019-08-15 RX ADMIN — Medication 2: at 11:59

## 2019-08-15 RX ADMIN — POLYETHYLENE GLYCOL 3350 17 GRAM(S): 17 POWDER, FOR SOLUTION ORAL at 05:18

## 2019-08-15 RX ADMIN — Medication 100 MILLIGRAM(S): at 05:18

## 2019-08-15 RX ADMIN — APIXABAN 2.5 MILLIGRAM(S): 2.5 TABLET, FILM COATED ORAL at 05:19

## 2019-08-15 RX ADMIN — LOSARTAN POTASSIUM 25 MILLIGRAM(S): 100 TABLET, FILM COATED ORAL at 05:18

## 2019-08-15 RX ADMIN — Medication 12.5 MILLIGRAM(S): at 05:19

## 2019-08-15 RX ADMIN — Medication 100 MILLIGRAM(S): at 11:55

## 2019-08-15 NOTE — PROGRESS NOTE ADULT - REASON FOR ADMISSION
L femoral neck fracture
fall, A fib

## 2019-08-15 NOTE — PROGRESS NOTE ADULT - SUBJECTIVE AND OBJECTIVE BOX
INTERVAL HPI/OVERNIGHT EVENTS:   Patient seen and examined. doing well pending rehab    MEDICATIONS  (STANDING):  apixaban 2.5 milliGRAM(s) Oral every 12 hours  atorvastatin 20 milliGRAM(s) Oral at bedtime  dextrose 5%. 1000 milliLiter(s) (50 mL/Hr) IV Continuous <Continuous>  dextrose 50% Injectable 12.5 Gram(s) IV Push once  dextrose 50% Injectable 25 Gram(s) IV Push once  dextrose 50% Injectable 25 Gram(s) IV Push once  docusate sodium 100 milliGRAM(s) Oral three times a day  insulin lispro (HumaLOG) corrective regimen sliding scale   SubCutaneous three times a day before meals  insulin lispro (HumaLOG) corrective regimen sliding scale   SubCutaneous at bedtime  losartan 25 milliGRAM(s) Oral daily  metoprolol tartrate 12.5 milliGRAM(s) Oral two times a day  polyethylene glycol 3350 17 Gram(s) Oral two times a day  senna 2 Tablet(s) Oral at bedtime    MEDICATIONS  (PRN):  acetaminophen   Tablet .. 650 milliGRAM(s) Oral every 6 hours PRN Temp greater or equal to 38C (100.4F), Mild Pain (1 - 3)  dextrose 40% Gel 15 Gram(s) Oral once PRN Blood Glucose LESS THAN 70 milliGRAM(s)/deciLiter  glucagon  Injectable 1 milliGRAM(s) IntraMuscular once PRN Glucose <70 milliGRAM(s)/deciLiter      REVIEW OF SYSTEMS:  See HPI,  all others negative    PHYSICAL EXAM:  Vital Signs Last 24 Hrs  T(C): 36.1 (15 Aug 2019 11:27), Max: 36.9 (14 Aug 2019 17:44)  T(F): 97 (15 Aug 2019 11:27), Max: 98.4 (14 Aug 2019 17:44)  HR: 82 (15 Aug 2019 11:27) (82 - 96)  BP: 130/56 (15 Aug 2019 11:27) (130/56 - 157/73)  BP(mean): --  RR: 17 (15 Aug 2019 11:27) (17 - 18)  SpO2: 96% (15 Aug 2019 11:27) (96% - 99%)    GENERAL: NAD, EYES: EOMI, PERRLA, conjunctiva and sclera clear  ENMT: No tonsillar erythema, exudates, or enlargement; Moist mucous membranes, Good dentition, No lesions  NECK: Supple, No JVD, No Cervical LAD, No thyromegaly, No thyroid nodules felt  NERVOUS SYSTEM:  Good concentration  CHEST/LUNG: Clear to auscultation bilaterally; No rales, rhonchi, wheezing, or rubs  HEART: Regular rate and rhythm; No murmurs, rubs, or gallops  ABDOMEN: Soft, Nontender, Nondistended, Bowel sounds present, No palpable masses or organomegaly, No bruits    LABS:                        12.9   11.09 )-----------( 235      ( 15 Aug 2019 07:49 )             41.5       Ca    8.7        14 Aug 2019 07:46             RADIOLOGY & ADDITIONAL TESTS:

## 2019-08-15 NOTE — DISCHARGE NOTE NURSING/CASE MANAGEMENT/SOCIAL WORK - NSDCDPATPORTLINK_GEN_ALL_CORE
You can access the [email or web address], offered by [facility], by registering with the following website: [email or web address]

## 2019-08-15 NOTE — PROGRESS NOTE ADULT - ASSESSMENT
89y Female brought to the ED because of a left hip fracture and new onset Afib. She was having outpatient testing at a cardiologist and was noted to be in afib. No fever, no chills, no abd pain, no back pain, no nausea, no vomiting, no weakness, no headache, no neck pain, no weakness. Found to have left femoral neck fracture, s/p pinning.    New onset A fib:  - Rate controlled Eliquis 2.5 mg bid  - 2D echo results noted      Demand ischemia:   Continue Lipitor 20 mg/day for HLD.       Left femoral neck fracture:  -S/p OR pinning, marcelo pending    HTN:  - Continue current meds of Cozaar 25 mg/day.       DM:  - Insulin SS      HLD:  - Continue Lipitor 20 mg/day.       DNR.    DC to [facility] once authorization complete

## 2019-08-15 NOTE — PROGRESS NOTE ADULT - PROVIDER SPECIALTY LIST ADULT
Cardiology
Hospitalist
Orthopedics
Critical Care

## 2019-08-15 NOTE — PROGRESS NOTE ADULT - ASSESSMENT
89 yo female with PMH: HTN, CVA, HLD, DM, was brought to the ED because of left hip fracture and new onset Afib. She fell 5 days ago and had outpatient testing and was at a cardiologist and was noted to be in afib.   ECho: EF 50-55%, mild MR/AS/pHTN, mild-mod TR  Atrial fibrillation on tele 80-90s, mostly in 80s  ·	New onset Afib  -On Eliquis  2.5 mg bid for AC  -cont with metoprolol 12.5 mg bid for HR control, monitor on tele 24 more hours,  increase bbl dose as needed    ·	Left femoral neck fracture   -s/p L hip CMN   -PT  -Pain control    ·	HTN  -cont with losartan 25 mg daily    ·	HLD  - cont with statin - Lipitor 20 mg daily     DNR status  Pt to continue f/u with Dr. Jose Starkey, her PCP. 89 yo female with PMH: HTN, CVA, HLD, DM, was brought to the ED because of left hip fracture and new onset Afib. She fell 5 days ago and had outpatient testing and was at a cardiologist and was noted to be in afib.   ECho: EF 50-55%, mild MR/AS/pHTN, mild-mod TR  Atrial fibrillation on tele 80-90s, mostly in 80s    ·	New onset Afib  -On Eliquis  2.5 mg bid for AC  -cont with metoprolol 12.5 mg bid for HR control, increase bbl dose as needed, currently HR in 80s    ·	Left femoral neck fracture   -s/p L hip CMN   -PT/Rehab  -Pain control    ·	HTN  -cont with losartan 25 mg daily    ·	HLD  - cont with statin - Lipitor 20 mg daily     DNR status  For Rehab  Pt to continue f/u with Dr. Jose Starkey, her PCP. 89 yo female with PMH: HTN, CVA, HLD, DM, was brought to the ED because of left hip fracture and new onset Afib. She fell 5 days ago and had outpatient testing and was at a cardiologist and was noted to be in afib.   ECho: EF 50-55%, mild MR/AS/pHTN, mild-mod TR  Atrial fibrillation on tele 80-90s, mostly in 80s    ·	New onset Afib  -On Eliquis  2.5 mg bid for AC  -cont with metoprolol 12.5 mg bid for HR control, increase bbl dose as needed, currently HR in 80s    ·	Left femoral neck fracture   -s/p L hip CMN   -PT/Rehab  -Pain control    ·	HTN  -cont with losartan 25 mg daily    ·	HLD  - cont with statin - Lipitor 20 mg daily     DNR status  For Rehab  Pt to continue f/u with Dr. Jose Starkey, her PCP.\  Will follow only as needed.

## 2019-08-15 NOTE — PROGRESS NOTE ADULT - SUBJECTIVE AND OBJECTIVE BOX
Patient is a 89y old  Female who presents with a chief complaint of fall, A fib (15 Aug 2019 12:16)    PAST MEDICAL & SURGICAL HISTORY:  Hyperlipidemia  DM (diabetes mellitus)  HTN (hypertension)  No significant past surgical history    INTERVAL HISTORY: resting in bed, not in any acute distress   	  MEDICATIONS:  MEDICATIONS  (STANDING):  apixaban 2.5 milliGRAM(s) Oral every 12 hours  atorvastatin 20 milliGRAM(s) Oral at bedtime  dextrose 5%. 1000 milliLiter(s) (50 mL/Hr) IV Continuous <Continuous>  dextrose 50% Injectable 12.5 Gram(s) IV Push once  dextrose 50% Injectable 25 Gram(s) IV Push once  dextrose 50% Injectable 25 Gram(s) IV Push once  docusate sodium 100 milliGRAM(s) Oral three times a day  insulin lispro (HumaLOG) corrective regimen sliding scale   SubCutaneous three times a day before meals  insulin lispro (HumaLOG) corrective regimen sliding scale   SubCutaneous at bedtime  losartan 25 milliGRAM(s) Oral daily  metoprolol tartrate 12.5 milliGRAM(s) Oral two times a day  polyethylene glycol 3350 17 Gram(s) Oral two times a day  senna 2 Tablet(s) Oral at bedtime    MEDICATIONS  (PRN):  acetaminophen   Tablet .. 650 milliGRAM(s) Oral every 6 hours PRN Temp greater or equal to 38C (100.4F), Mild Pain (1 - 3)  dextrose 40% Gel 15 Gram(s) Oral once PRN Blood Glucose LESS THAN 70 milliGRAM(s)/deciLiter  glucagon  Injectable 1 milliGRAM(s) IntraMuscular once PRN Glucose <70 milliGRAM(s)/deciLiter    Vitals:  T(F): 97 (08-15-19 @ 11:27), Max: 98.4 (08-14-19 @ 17:44)  HR: 86 (08-15-19 @ 12:27) (82 - 96)  BP: 131/67 (08-15-19 @ 12:27) (130/56 - 157/73)  RR: 19 (08-15-19 @ 12:27) (17 - 19)  SpO2: 95% (08-15-19 @ 12:27) (95% - 99%)  Wt(kg): --60.2 kg    08-14 @ 07:01  -  08-15 @ 07:00  --------------------------------------------------------  IN:    Oral Fluid: 120 mL  Total IN: 120 mL    OUT:    Voided: 800 mL  Total OUT: 800 mL    Total NET: -680 mL    PHYSICAL EXAM:  Neuro: Awake, responsive  CV: S1 S2 irregular   Lungs: CTABL  GI: Soft, BS +, ND, NT  Extremities: Lt thigh dressing    TELEMETRY: Atrial fibrillation 80-90s, mostly in 80s    RADIOLOGY: < from: Xray Chest 1 View-PORTABLE IMMEDIATE (08.09.19 @ 17:56) >    Cardiomegaly. Chronic interstitial lung disease suggested in both lungs.     < end of copied text >    DIAGNOSTIC TESTING:    [x ] Echocardiogram: < from: TTE Echo Doppler w/o Cont (08.10.19 @ 12:30) >   1. Left ventricular ejection fraction, by visual estimation, is 50 to   55%.   2. Technically limited study.   3. Normal global left ventricular systolic function.   4. Normal left ventricular internal cavity size.   5. The mitral in-flow pattern reveals no discernable A-wave, therefore   no comment on diastolic function can be made.   6. There is mild concentric left ventricular hypertrophy.   7. Normal right ventricular size and function.   8. There is no evidence of pericardial effusion.   9. Mild mitral annular calcification.  10. Mild mitral valve regurgitation.  11. Moderate thickening and calcification of the anterior and posterior   mitral valve leaflets.  12. Degenerative tricuspid valve.  13. Mild-moderate tricuspid regurgitation.  14. Estimated pulmonary artery systolic pressure is 43.4 mmHg assuming a   right atrial pressure of 5 mmHg, which is consistent with mild pulmonary   hypertension.  15. Peak transaortic gradient equals 18.2 mmHg, mean transaortic gradient   equals 6.2 mmHg, the calculated aortic valve area equals2.14 cm² by the   continuity equation consistent with mild aortic stenosis.  16. There is mild aortic root calcification.    < end of copied text >      LABS:	 	    14 Aug 2019 07:46    138    |  102    |  15     ----------------------------<  124    4.1     |  29     |  0.45   13 Aug 2019 07:45    138    |  100    |  19     ----------------------------<  116    4.1     |  32     |  0.48     Ca    8.7        14 Aug 2019 07:46    TPro  6.6    /  Alb  2.6    /  TBili  0.6    /  DBili  x      /  AST  31     /  ALT  29     /  AlkPhos  66     14 Aug 2019 07:46                          12.9   11.09 )-----------( 235      ( 15 Aug 2019 07:49 )             41.5 ,                       13.0   8.92  )-----------( 146      ( 14 Aug 2019 07:46 )             41.2 ,                       12.4   10.06 )-----------( 211      ( 13 Aug 2019 07:45 )             40.0 Patient is a 89y old  Female who presents with a chief complaint of fall, A fib (15 Aug 2019 12:16)    PAST MEDICAL & SURGICAL HISTORY:  Hyperlipidemia  DM (diabetes mellitus)  HTN (hypertension)  No significant past surgical history    INTERVAL HISTORY: resting in chair, not in any acute distress  	  MEDICATIONS:  MEDICATIONS  (STANDING):  apixaban 2.5 milliGRAM(s) Oral every 12 hours  atorvastatin 20 milliGRAM(s) Oral at bedtime  dextrose 5%. 1000 milliLiter(s) (50 mL/Hr) IV Continuous <Continuous>  dextrose 50% Injectable 12.5 Gram(s) IV Push once  dextrose 50% Injectable 25 Gram(s) IV Push once  dextrose 50% Injectable 25 Gram(s) IV Push once  docusate sodium 100 milliGRAM(s) Oral three times a day  insulin lispro (HumaLOG) corrective regimen sliding scale   SubCutaneous three times a day before meals  insulin lispro (HumaLOG) corrective regimen sliding scale   SubCutaneous at bedtime  losartan 25 milliGRAM(s) Oral daily  metoprolol tartrate 12.5 milliGRAM(s) Oral two times a day  polyethylene glycol 3350 17 Gram(s) Oral two times a day  senna 2 Tablet(s) Oral at bedtime    MEDICATIONS  (PRN):  acetaminophen   Tablet .. 650 milliGRAM(s) Oral every 6 hours PRN Temp greater or equal to 38C (100.4F), Mild Pain (1 - 3)  dextrose 40% Gel 15 Gram(s) Oral once PRN Blood Glucose LESS THAN 70 milliGRAM(s)/deciLiter  glucagon  Injectable 1 milliGRAM(s) IntraMuscular once PRN Glucose <70 milliGRAM(s)/deciLiter    Vitals:  T(F): 97 (08-15-19 @ 11:27), Max: 98.4 (08-14-19 @ 17:44)  HR: 86 (08-15-19 @ 12:27) (82 - 96)  BP: 131/67 (08-15-19 @ 12:27) (130/56 - 157/73)  RR: 19 (08-15-19 @ 12:27) (17 - 19)  SpO2: 95% (08-15-19 @ 12:27) (95% - 99%)  Wt(kg): --60.2 kg    08-14 @ 07:01  -  08-15 @ 07:00  --------------------------------------------------------  IN:    Oral Fluid: 120 mL  Total IN: 120 mL    OUT:    Voided: 800 mL  Total OUT: 800 mL    Total NET: -680 mL    PHYSICAL EXAM:  Neuro: Awake, responsive  CV: S1 S2 irregular, + SM   Lungs: CTABL  GI: Soft, BS +, ND, NT  Extremities: Lt thigh dressing    TELEMETRY: Atrial fibrillation 80-90s, mostly in 80s    RADIOLOGY: < from: Xray Chest 1 View-PORTABLE IMMEDIATE (08.09.19 @ 17:56) >    Cardiomegaly. Chronic interstitial lung disease suggested in both lungs.     < end of copied text >    DIAGNOSTIC TESTING:    [x ] Echocardiogram: < from: TTE Echo Doppler w/o Cont (08.10.19 @ 12:30) >   1. Left ventricular ejection fraction, by visual estimation, is 50 to   55%.   2. Technically limited study.   3. Normal global left ventricular systolic function.   4. Normal left ventricular internal cavity size.   5. The mitral in-flow pattern reveals no discernable A-wave, therefore   no comment on diastolic function can be made.   6. There is mild concentric left ventricular hypertrophy.   7. Normal right ventricular size and function.   8. There is no evidence of pericardial effusion.   9. Mild mitral annular calcification.  10. Mild mitral valve regurgitation.  11. Moderate thickening and calcification of the anterior and posterior   mitral valve leaflets.  12. Degenerative tricuspid valve.  13. Mild-moderate tricuspid regurgitation.  14. Estimated pulmonary artery systolic pressure is 43.4 mmHg assuming a   right atrial pressure of 5 mmHg, which is consistent with mild pulmonary   hypertension.  15. Peak transaortic gradient equals 18.2 mmHg, mean transaortic gradient   equals 6.2 mmHg, the calculated aortic valve area equals2.14 cm² by the   continuity equation consistent with mild aortic stenosis.  16. There is mild aortic root calcification.    < end of copied text >      LABS:	 	    14 Aug 2019 07:46    138    |  102    |  15     ----------------------------<  124    4.1     |  29     |  0.45   13 Aug 2019 07:45    138    |  100    |  19     ----------------------------<  116    4.1     |  32     |  0.48     Ca    8.7        14 Aug 2019 07:46    TPro  6.6    /  Alb  2.6    /  TBili  0.6    /  DBili  x      /  AST  31     /  ALT  29     /  AlkPhos  66     14 Aug 2019 07:46                          12.9   11.09 )-----------( 235      ( 15 Aug 2019 07:49 )             41.5 ,                       13.0   8.92  )-----------( 146      ( 14 Aug 2019 07:46 )             41.2 ,                       12.4   10.06 )-----------( 211      ( 13 Aug 2019 07:45 )             40.0

## 2019-08-20 DIAGNOSIS — I08.0 RHEUMATIC DISORDERS OF BOTH MITRAL AND AORTIC VALVES: ICD-10-CM

## 2019-08-20 DIAGNOSIS — I48.91 UNSPECIFIED ATRIAL FIBRILLATION: ICD-10-CM
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
## 2019-08-20 DIAGNOSIS — S72.002A FRACTURE OF UNSPECIFIED PART OF NECK OF LEFT FEMUR, INITIAL ENCOUNTER FOR CLOSED FRACTURE: ICD-10-CM

## 2019-08-20 DIAGNOSIS — Z87.891 PERSONAL HISTORY OF NICOTINE DEPENDENCE: ICD-10-CM

## 2019-08-20 DIAGNOSIS — E11.9 TYPE 2 DIABETES MELLITUS WITHOUT COMPLICATIONS: ICD-10-CM

## 2019-08-20 DIAGNOSIS — Z66 DO NOT RESUSCITATE: ICD-10-CM

## 2019-08-20 DIAGNOSIS — R09.02 HYPOXEMIA: ICD-10-CM

## 2019-08-20 DIAGNOSIS — Z79.01 LONG TERM (CURRENT) USE OF ANTICOAGULANTS: ICD-10-CM

## 2019-08-20 DIAGNOSIS — E87.2 ACIDOSIS: ICD-10-CM

## 2019-08-20 DIAGNOSIS — E78.5 HYPERLIPIDEMIA, UNSPECIFIED: ICD-10-CM

## 2019-08-20 DIAGNOSIS — I10 ESSENTIAL (PRIMARY) HYPERTENSION: ICD-10-CM
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
## 2019-08-20 DIAGNOSIS — I24.8 OTHER FORMS OF ACUTE ISCHEMIC HEART DISEASE: ICD-10-CM

## 2019-08-20 DIAGNOSIS — I51.7 CARDIOMEGALY: ICD-10-CM

## 2019-08-20 DIAGNOSIS — I95.89 OTHER HYPOTENSION: ICD-10-CM

## 2019-08-20 DIAGNOSIS — J84.9 INTERSTITIAL PULMONARY DISEASE, UNSPECIFIED: ICD-10-CM

## 2019-08-20 DIAGNOSIS — R74.8 ABNORMAL LEVELS OF OTHER SERUM ENZYMES: ICD-10-CM

## 2019-08-20 DIAGNOSIS — I27.20 PULMONARY HYPERTENSION, UNSPECIFIED: ICD-10-CM

## 2019-08-22 PROBLEM — Z00.00 ENCOUNTER FOR PREVENTIVE HEALTH EXAMINATION: Status: ACTIVE | Noted: 2019-08-22

## 2019-08-28 ENCOUNTER — INPATIENT (INPATIENT)
Facility: HOSPITAL | Age: 84
LOS: 6 days | Discharge: NURSING, REHAB OR LONG-TERM CARE | End: 2019-09-04
Attending: INTERNAL MEDICINE | Admitting: INTERNAL MEDICINE
Payer: MEDICARE

## 2019-08-28 VITALS
HEIGHT: 64 IN | OXYGEN SATURATION: 95 % | HEART RATE: 54 BPM | DIASTOLIC BLOOD PRESSURE: 70 MMHG | WEIGHT: 119.05 LBS | SYSTOLIC BLOOD PRESSURE: 160 MMHG | RESPIRATION RATE: 19 BRPM | TEMPERATURE: 98 F

## 2019-08-28 DIAGNOSIS — R00.1 BRADYCARDIA, UNSPECIFIED: ICD-10-CM

## 2019-08-28 DIAGNOSIS — E78.5 HYPERLIPIDEMIA, UNSPECIFIED: ICD-10-CM

## 2019-08-28 DIAGNOSIS — I10 ESSENTIAL (PRIMARY) HYPERTENSION: ICD-10-CM

## 2019-08-28 DIAGNOSIS — G45.9 TRANSIENT CEREBRAL ISCHEMIC ATTACK, UNSPECIFIED: ICD-10-CM

## 2019-08-28 LAB
ALBUMIN SERPL ELPH-MCNC: 2.9 G/DL — LOW (ref 3.3–5)
ALP SERPL-CCNC: 65 U/L — SIGNIFICANT CHANGE UP (ref 40–120)
ALT FLD-CCNC: 18 U/L — SIGNIFICANT CHANGE UP (ref 12–78)
ANION GAP SERPL CALC-SCNC: 5 MMOL/L — SIGNIFICANT CHANGE UP (ref 5–17)
APTT BLD: 33.6 SEC — SIGNIFICANT CHANGE UP (ref 28.5–37)
AST SERPL-CCNC: 16 U/L — SIGNIFICANT CHANGE UP (ref 15–37)
BASOPHILS # BLD AUTO: 0.03 K/UL — SIGNIFICANT CHANGE UP (ref 0–0.2)
BASOPHILS NFR BLD AUTO: 0.4 % — SIGNIFICANT CHANGE UP (ref 0–2)
BILIRUB SERPL-MCNC: 0.6 MG/DL — SIGNIFICANT CHANGE UP (ref 0.2–1.2)
BUN SERPL-MCNC: 19 MG/DL — SIGNIFICANT CHANGE UP (ref 7–23)
CALCIUM SERPL-MCNC: 8.8 MG/DL — SIGNIFICANT CHANGE UP (ref 8.5–10.1)
CHLORIDE SERPL-SCNC: 106 MMOL/L — SIGNIFICANT CHANGE UP (ref 96–108)
CK MB BLD-MCNC: 4.5 % — HIGH (ref 0–3.5)
CK MB CFR SERPL CALC: 2.6 NG/ML — SIGNIFICANT CHANGE UP (ref 0.5–3.6)
CK SERPL-CCNC: 58 U/L — SIGNIFICANT CHANGE UP (ref 26–192)
CO2 SERPL-SCNC: 31 MMOL/L — SIGNIFICANT CHANGE UP (ref 22–31)
CREAT SERPL-MCNC: 0.58 MG/DL — SIGNIFICANT CHANGE UP (ref 0.5–1.3)
EOSINOPHIL # BLD AUTO: 0.13 K/UL — SIGNIFICANT CHANGE UP (ref 0–0.5)
EOSINOPHIL NFR BLD AUTO: 1.9 % — SIGNIFICANT CHANGE UP (ref 0–6)
GLUCOSE BLDC GLUCOMTR-MCNC: 120 MG/DL — HIGH (ref 70–99)
GLUCOSE SERPL-MCNC: 107 MG/DL — HIGH (ref 70–99)
HCT VFR BLD CALC: 44.6 % — SIGNIFICANT CHANGE UP (ref 34.5–45)
HGB BLD-MCNC: 13.6 G/DL — SIGNIFICANT CHANGE UP (ref 11.5–15.5)
IMM GRANULOCYTES NFR BLD AUTO: 0.3 % — SIGNIFICANT CHANGE UP (ref 0–1.5)
INR BLD: 1.5 RATIO — HIGH (ref 0.88–1.16)
LYMPHOCYTES # BLD AUTO: 1.14 K/UL — SIGNIFICANT CHANGE UP (ref 1–3.3)
LYMPHOCYTES # BLD AUTO: 16.5 % — SIGNIFICANT CHANGE UP (ref 13–44)
MCHC RBC-ENTMCNC: 28.4 PG — SIGNIFICANT CHANGE UP (ref 27–34)
MCHC RBC-ENTMCNC: 30.5 GM/DL — LOW (ref 32–36)
MCV RBC AUTO: 93.1 FL — SIGNIFICANT CHANGE UP (ref 80–100)
MONOCYTES # BLD AUTO: 0.68 K/UL — SIGNIFICANT CHANGE UP (ref 0–0.9)
MONOCYTES NFR BLD AUTO: 9.8 % — SIGNIFICANT CHANGE UP (ref 2–14)
NEUTROPHILS # BLD AUTO: 4.93 K/UL — SIGNIFICANT CHANGE UP (ref 1.8–7.4)
NEUTROPHILS NFR BLD AUTO: 71.1 % — SIGNIFICANT CHANGE UP (ref 43–77)
NRBC # BLD: 0 /100 WBCS — SIGNIFICANT CHANGE UP (ref 0–0)
PLATELET # BLD AUTO: 271 K/UL — SIGNIFICANT CHANGE UP (ref 150–400)
POTASSIUM SERPL-MCNC: 4.5 MMOL/L — SIGNIFICANT CHANGE UP (ref 3.5–5.3)
POTASSIUM SERPL-SCNC: 4.5 MMOL/L — SIGNIFICANT CHANGE UP (ref 3.5–5.3)
PROT SERPL-MCNC: 7.2 GM/DL — SIGNIFICANT CHANGE UP (ref 6–8.3)
PROTHROM AB SERPL-ACNC: 17 SEC — HIGH (ref 10–12.9)
RBC # BLD: 4.79 M/UL — SIGNIFICANT CHANGE UP (ref 3.8–5.2)
RBC # FLD: 14.6 % — HIGH (ref 10.3–14.5)
SODIUM SERPL-SCNC: 142 MMOL/L — SIGNIFICANT CHANGE UP (ref 135–145)
TROPONIN I SERPL-MCNC: 0.06 NG/ML — HIGH (ref 0.01–0.04)
WBC # BLD: 6.93 K/UL — SIGNIFICANT CHANGE UP (ref 3.8–10.5)
WBC # FLD AUTO: 6.93 K/UL — SIGNIFICANT CHANGE UP (ref 3.8–10.5)

## 2019-08-28 PROCEDURE — 71045 X-RAY EXAM CHEST 1 VIEW: CPT | Mod: 26

## 2019-08-28 PROCEDURE — 99285 EMERGENCY DEPT VISIT HI MDM: CPT

## 2019-08-28 PROCEDURE — 70450 CT HEAD/BRAIN W/O DYE: CPT | Mod: 26

## 2019-08-28 PROCEDURE — 93010 ELECTROCARDIOGRAM REPORT: CPT

## 2019-08-28 PROCEDURE — 99223 1ST HOSP IP/OBS HIGH 75: CPT

## 2019-08-28 RX ORDER — POLYETHYLENE GLYCOL 3350 17 G/17G
17 POWDER, FOR SOLUTION ORAL
Refills: 0 | Status: DISCONTINUED | OUTPATIENT
Start: 2019-08-28 | End: 2019-09-04

## 2019-08-28 RX ORDER — DOCUSATE SODIUM 100 MG
100 CAPSULE ORAL THREE TIMES A DAY
Refills: 0 | Status: DISCONTINUED | OUTPATIENT
Start: 2019-08-28 | End: 2019-09-04

## 2019-08-28 RX ORDER — SENNA PLUS 8.6 MG/1
2 TABLET ORAL AT BEDTIME
Refills: 0 | Status: DISCONTINUED | OUTPATIENT
Start: 2019-08-28 | End: 2019-09-04

## 2019-08-28 RX ORDER — APIXABAN 2.5 MG/1
2.5 TABLET, FILM COATED ORAL EVERY 12 HOURS
Refills: 0 | Status: DISCONTINUED | OUTPATIENT
Start: 2019-08-28 | End: 2019-09-04

## 2019-08-28 RX ORDER — ATORVASTATIN CALCIUM 80 MG/1
40 TABLET, FILM COATED ORAL AT BEDTIME
Refills: 0 | Status: DISCONTINUED | OUTPATIENT
Start: 2019-08-28 | End: 2019-09-02

## 2019-08-28 RX ORDER — ACETAMINOPHEN 500 MG
650 TABLET ORAL EVERY 6 HOURS
Refills: 0 | Status: DISCONTINUED | OUTPATIENT
Start: 2019-08-28 | End: 2019-09-03

## 2019-08-28 RX ORDER — LOSARTAN POTASSIUM 100 MG/1
25 TABLET, FILM COATED ORAL DAILY
Refills: 0 | Status: DISCONTINUED | OUTPATIENT
Start: 2019-08-28 | End: 2019-09-04

## 2019-08-28 RX ADMIN — SENNA PLUS 2 TABLET(S): 8.6 TABLET ORAL at 22:16

## 2019-08-28 RX ADMIN — APIXABAN 2.5 MILLIGRAM(S): 2.5 TABLET, FILM COATED ORAL at 18:20

## 2019-08-28 RX ADMIN — Medication 100 MILLIGRAM(S): at 22:16

## 2019-08-28 RX ADMIN — ATORVASTATIN CALCIUM 40 MILLIGRAM(S): 80 TABLET, FILM COATED ORAL at 22:16

## 2019-08-28 RX ADMIN — POLYETHYLENE GLYCOL 3350 17 GRAM(S): 17 POWDER, FOR SOLUTION ORAL at 18:20

## 2019-08-28 RX ADMIN — Medication 650 MILLIGRAM(S): at 18:20

## 2019-08-28 NOTE — CONSULT NOTE ADULT - SUBJECTIVE AND OBJECTIVE BOX
HPI: 89 year old woman with hx of A-fib, Dementia, HTN, and HLD presenting with AMS. As per chart, she was in [facility] rehab and last known her baseline at 10am. At 10:30am she was noted to be unresponsive and slurring her words. She was brought to the ER. CT head was unremarkable. Her symptoms improved. tPA was not given. As per chart, the daughter-in-law states she had a similar episode on 8/22/19.     PMHx: HTN, HLD, Dementia, A-fib  PSHx: None  FHx: None  Allergies: NKDA  ROS: unable to obtain due to AMS  Social Hx: non-smoker, no etoh, no illicit drug use  Meds: see EMR    Vitals: Temp 98.7F   RR 16   HR 60   /57  General: NAD  Neuro Exam: AOx1. No dysarthria. No aphasia. Follows commands. Mild left facial droop. Tongue is midline. BTT. PERRL. Moving arms and legs against gravity with mild left leg weakness (externally rotated in bed). Reflexes symmetric and toes down. Gait exam deferred.     NIHSS- 4    CT head and labs reviewed

## 2019-08-28 NOTE — ED ADULT NURSE NOTE - NSIMPLEMENTINTERV_GEN_ALL_ED
Implemented All Fall Risk Interventions:  [place] to call system. Call bell, personal items and telephone within reach. Instruct patient to call for assistance. Room bathroom lighting operational. Non-slip footwear when patient is off stretcher. Physically safe environment: no spills, clutter or unnecessary equipment. Stretcher in lowest position, wheels locked, appropriate side rails in place. Provide visual cue, wrist band, yellow gown, etc. Monitor gait and stability. Monitor for mental status changes and reorient to person, place, and time. Review medications for side effects contributing to fall risk. Reinforce activity limits and safety measures with patient and family.

## 2019-08-28 NOTE — ED PROVIDER NOTE - OBJECTIVE STATEMENT
leg pain hx of DVT
Pt is a 89 yo lady with a pmhx of HTN, HL, DM, Afib on eliquis, dementia, coming from [facility] rehab after [facility] who presents to the ED with AMS and sent in for stroke evaluation. Last known normal was 10 AM this morning, and around 10:30, staff noted that she was less responsive, and some slurring of speech. By the time she was brought to ED, symptoms were improving and resolved. Daughter in law says this happened last Thursday as well. No headache, no n/v/d, no chest pain, no sob, no dysuria, no fevers.

## 2019-08-28 NOTE — ED ADULT NURSE NOTE - CHIEF COMPLAINT QUOTE
patient brought in  from [facility] for evaluation R/O stroke , as per nurse Chari patient had an episode of unresponsiveness , patient awake and talking at the time of triage , patient had an similar episode on Monday also as per nurse

## 2019-08-28 NOTE — H&P ADULT - ASSESSMENT
89F sent from skilled nursing facility with unresponsiveness with slurred speech - no info from patient   patient will be seen by neurology     IMPROVE VTE Individual Risk Assessment        RISK                                                          Points  [  ] Previous VTE                                                3  [  ] Thrombophilia                                             2  [  ] Lower limb paralysis                                   2        (unable to hold up >15 seconds)    [  ] Current Cancer                                            2         (within 6 months)  [  ] Immobilization > 24 hrs                              1  [  ] ICU/CCU stay > 24 hours                            1  [  ] Age > 60                                                    1  IMPROVE VTE Score ______2___

## 2019-08-28 NOTE — H&P ADULT - HISTORY OF PRESENT ILLNESS
· Chief Complaint Quote	patient brought in  from [facility] for evaluation R/O stroke , as per nurse Chari patient had an episode of unresponsiveness , patient awake and talking at the time of triage , patient had an similar episode on Monday also as per nurse patient brought in  from [facility] for evaluation R/O stroke , as per nurse Chari patient had an episode of unresponsiveness  with some slurred speech  patient awake but patient had an similar episode on Monday also as per nurse - pulseatient said that she feelfine but has dementia

## 2019-08-28 NOTE — ED ADULT TRIAGE NOTE - CHIEF COMPLAINT QUOTE
patient brought in  from [facility] for evaluation R/O stroke , as per nurse Chari patient had an episode of unresponsiveness , patient awake and talking at the time of triage , patient had an similar episode on Monday also as per nurse

## 2019-08-28 NOTE — H&P ADULT - NSHPPHYSICALEXAM_GEN_ALL_CORE
ICU Vital Signs Last 24 Hrs  T(C): 36.7 (28 Aug 2019 10:44), Max: 36.7 (28 Aug 2019 10:44)  T(F): 98.1 (28 Aug 2019 10:44), Max: 98.1 (28 Aug 2019 10:44)  HR: 54 (28 Aug 2019 10:44) (54 - 54)  BP: 160/70 (28 Aug 2019 10:44) (160/70 - 160/70)  BP(mean): --  ABP: --  ABP(mean): --  RR: 19 (28 Aug 2019 10:44) (19 - 19)  SpO2: 95% (28 Aug 2019 10:44) (95% - 95%)  GENERAL: NAD well-developed  HEAD:  Atraumatic, Normocephalic  EYES: EOMI, PERRLA, conjunctiva and sclera clear  ENMT: No tonsillar erythema, exudates, or enlargement; Moist mucous membranes, Good dentition, No lesions  NECK: Supple, No JVD, Normal thyroid  NERVOUS SYSTEM:  Alert & Oriented X3, Good concentration; Motor Strength 5/5 B/L upper and lower extremities; DTRs 2+ intact and symmetric  CHEST/LUNG: Clear to percussion bilaterally; No rales, rhonchi, wheezing, or rubs  HEART: Regular rate and rhythm; No murmurs, rubs, or gallops  ABDOMEN: Soft, Nontender, Nondistended; Bowel sounds present  EXTREMITIES:  2+ Peripheral Pulses, No clubbing, cyanosis, or edema  LYMPH: No lymphadenopathy   SKIN: No rashes or lesions ICU Vital Signs Last 24 Hrs  T(C): 36.7 (28 Aug 2019 10:44), Max: 36.7 (28 Aug 2019 10:44)  T(F): 98.1 (28 Aug 2019 10:44), Max: 98.1 (28 Aug 2019 10:44)  HR: 54 (28 Aug 2019 10:44) (54 - 54)  BP: 160/70 (28 Aug 2019 10:44) (160/70 - 160/70)  BP(mean): --  ABP: --  ABP(mean): --  RR: 19 (28 Aug 2019 10:44) (19 - 19)  SpO2: 95% (28 Aug 2019 10:44) (95% - 95%)  GENERAL: NAD well-developed  HEAD:  Atraumatic, Normocephalic  EYES: EOMI, PERRLA, conjunctiva and sclera clear  ENMT: No tonsillar erythema, exudates, or enlargement; Moist mucous membranes, Good dentition, No lesions  NECK: Supple, No JVD, Normal thyroid  NERVOUS SYSTEM:  awake and alert   CHEST/LUNG: Clear to percussion bilaterally; No rales, rhonchi, wheezing, or rubs  HEART: Regular rate and rhythm; No murmurs, rubs, or gallops  ABDOMEN: Soft, Nontender, Nondistended; Bowel sounds present  EXTREMITIES:  2+ Peripheral Pulses, No clubbing, cyanosis, or edema  LYMPH: No lymphadenopathy   SKIN: No rashes or lesions

## 2019-08-28 NOTE — CONSULT NOTE ADULT - ASSESSMENT
Possible TIA/stroke  Possible seizure  A-fib  HTN  HLD  Dementia    Patient has subtle left facial droop and left leg weakness on exam. I recommend MRI brain to rule out stroke. Patient on Eliquis and Lipitor for secondary stroke prevention. I recommend EEG to rule out seizure as she had a similar episode on 8/22/19 noted by her daughter-in-law. Thank you for the consult.

## 2019-08-29 ENCOUNTER — TRANSCRIPTION ENCOUNTER (OUTPATIENT)
Age: 84
End: 2019-08-29

## 2019-08-29 DIAGNOSIS — Z29.9 ENCOUNTER FOR PROPHYLACTIC MEASURES, UNSPECIFIED: ICD-10-CM

## 2019-08-29 DIAGNOSIS — G30.1 ALZHEIMER'S DISEASE WITH LATE ONSET: ICD-10-CM

## 2019-08-29 LAB
ANION GAP SERPL CALC-SCNC: 7 MMOL/L — SIGNIFICANT CHANGE UP (ref 5–17)
BUN SERPL-MCNC: 19 MG/DL — SIGNIFICANT CHANGE UP (ref 7–23)
CALCIUM SERPL-MCNC: 9.2 MG/DL — SIGNIFICANT CHANGE UP (ref 8.5–10.1)
CHLORIDE SERPL-SCNC: 104 MMOL/L — SIGNIFICANT CHANGE UP (ref 96–108)
CHOLEST SERPL-MCNC: 149 MG/DL — SIGNIFICANT CHANGE UP (ref 10–199)
CO2 SERPL-SCNC: 29 MMOL/L — SIGNIFICANT CHANGE UP (ref 22–31)
CREAT SERPL-MCNC: 0.53 MG/DL — SIGNIFICANT CHANGE UP (ref 0.5–1.3)
GLUCOSE SERPL-MCNC: 106 MG/DL — HIGH (ref 70–99)
HBA1C BLD-MCNC: 6.8 % — HIGH (ref 4–5.6)
HCT VFR BLD CALC: 45.1 % — HIGH (ref 34.5–45)
HDLC SERPL-MCNC: 53 MG/DL — SIGNIFICANT CHANGE UP
HGB BLD-MCNC: 14.3 G/DL — SIGNIFICANT CHANGE UP (ref 11.5–15.5)
LIPID PNL WITH DIRECT LDL SERPL: 82 MG/DL — SIGNIFICANT CHANGE UP
MCHC RBC-ENTMCNC: 29.3 PG — SIGNIFICANT CHANGE UP (ref 27–34)
MCHC RBC-ENTMCNC: 31.7 GM/DL — LOW (ref 32–36)
MCV RBC AUTO: 92.4 FL — SIGNIFICANT CHANGE UP (ref 80–100)
NRBC # BLD: 0 /100 WBCS — SIGNIFICANT CHANGE UP (ref 0–0)
PLATELET # BLD AUTO: 250 K/UL — SIGNIFICANT CHANGE UP (ref 150–400)
POTASSIUM SERPL-MCNC: 3.9 MMOL/L — SIGNIFICANT CHANGE UP (ref 3.5–5.3)
POTASSIUM SERPL-SCNC: 3.9 MMOL/L — SIGNIFICANT CHANGE UP (ref 3.5–5.3)
RBC # BLD: 4.88 M/UL — SIGNIFICANT CHANGE UP (ref 3.8–5.2)
RBC # FLD: 14.6 % — HIGH (ref 10.3–14.5)
SODIUM SERPL-SCNC: 140 MMOL/L — SIGNIFICANT CHANGE UP (ref 135–145)
TOTAL CHOLESTEROL/HDL RATIO MEASUREMENT: 2.8 RATIO — LOW (ref 3.3–7.1)
TRIGL SERPL-MCNC: 68 MG/DL — SIGNIFICANT CHANGE UP (ref 10–149)
WBC # BLD: 6.52 K/UL — SIGNIFICANT CHANGE UP (ref 3.8–10.5)
WBC # FLD AUTO: 6.52 K/UL — SIGNIFICANT CHANGE UP (ref 3.8–10.5)

## 2019-08-29 PROCEDURE — 99233 SBSQ HOSP IP/OBS HIGH 50: CPT

## 2019-08-29 PROCEDURE — 70551 MRI BRAIN STEM W/O DYE: CPT | Mod: 26

## 2019-08-29 RX ADMIN — POLYETHYLENE GLYCOL 3350 17 GRAM(S): 17 POWDER, FOR SOLUTION ORAL at 18:18

## 2019-08-29 RX ADMIN — LOSARTAN POTASSIUM 25 MILLIGRAM(S): 100 TABLET, FILM COATED ORAL at 05:09

## 2019-08-29 RX ADMIN — Medication 650 MILLIGRAM(S): at 00:59

## 2019-08-29 RX ADMIN — Medication 650 MILLIGRAM(S): at 18:14

## 2019-08-29 RX ADMIN — Medication 650 MILLIGRAM(S): at 05:09

## 2019-08-29 RX ADMIN — APIXABAN 2.5 MILLIGRAM(S): 2.5 TABLET, FILM COATED ORAL at 05:09

## 2019-08-29 RX ADMIN — POLYETHYLENE GLYCOL 3350 17 GRAM(S): 17 POWDER, FOR SOLUTION ORAL at 05:08

## 2019-08-29 RX ADMIN — Medication 100 MILLIGRAM(S): at 05:09

## 2019-08-29 RX ADMIN — Medication 650 MILLIGRAM(S): at 02:03

## 2019-08-29 RX ADMIN — Medication 100 MILLIGRAM(S): at 21:16

## 2019-08-29 RX ADMIN — ATORVASTATIN CALCIUM 40 MILLIGRAM(S): 80 TABLET, FILM COATED ORAL at 21:16

## 2019-08-29 RX ADMIN — SENNA PLUS 2 TABLET(S): 8.6 TABLET ORAL at 21:16

## 2019-08-29 RX ADMIN — APIXABAN 2.5 MILLIGRAM(S): 2.5 TABLET, FILM COATED ORAL at 18:14

## 2019-08-29 RX ADMIN — Medication 100 MILLIGRAM(S): at 14:03

## 2019-08-29 NOTE — PHYSICAL THERAPY INITIAL EVALUATION ADULT - ADDITIONAL COMMENTS
[facility]. Pt states she can  ambulate using a rolling walker with assistance.

## 2019-08-29 NOTE — PHYSICAL THERAPY INITIAL EVALUATION ADULT - GAIT TRAINING, PT EVAL
Independent in ambulation with use of rolling walker device up to 300 feet observing proper gait pattern, posture and use of walking device safely.

## 2019-08-29 NOTE — PHYSICAL THERAPY INITIAL EVALUATION ADULT - PERTINENT HX OF CURRENT PROBLEM, REHAB EVAL
Admitted with dx TIA, pt is from [facility] rehab, CT 8/28 ; Involutional and ischemic gliotic changes, MRI no intracranial hemorrage

## 2019-08-29 NOTE — DISCHARGE NOTE NURSING/CASE MANAGEMENT/SOCIAL WORK - PATIENT PORTAL LINK FT
You can access the FollowMyHealth Patient Portal offered by [facility] by registering at the following website: [email or web address]. By joining [facility]’s FollowMyHealth portal, you will also be able to view your health information using other applications (apps) compatible with our system.

## 2019-08-29 NOTE — PHYSICAL THERAPY INITIAL EVALUATION ADULT - STRENGTHENING, PT EVAL
Improve strength in the UE and LE to 5/5 and be able to perform functional tasks-bed mobility, sitting, standing, transfers and ambulate in a safe manner using assistive device if needed and prevent falls.

## 2019-08-29 NOTE — PHYSICAL THERAPY INITIAL EVALUATION ADULT - IMPAIRMENTS FOUND, PT EVAL
aerobic capacity/endurance/ergonomics and body mechanics/gait, locomotion, and balance/muscle strength

## 2019-08-29 NOTE — PHYSICAL THERAPY INITIAL EVALUATION ADULT - LIVES WITH, PROFILE
Pt is confused and disoriented x 4, information given by patient needs to be confirmed from family members ; As per patient she lives with family members, not sure how many.

## 2019-08-29 NOTE — PHYSICAL THERAPY INITIAL EVALUATION ADULT - GAIT DEVIATIONS NOTED, PT EVAL
decreased britta/footdrop/decreased velocity of limb motion/decreased stride length/decreased step length

## 2019-08-29 NOTE — DISCHARGE NOTE NURSING/CASE MANAGEMENT/SOCIAL WORK - NSDCPEPTSTRK_GEN_ALL_CORE
Call 911 for stroke/Stroke warning signs and symptoms/Signs and symptoms of stroke/Stroke education booklet/Stroke support groups for patients, families, and friends/Need for follow up after discharge/Prescribed medications/Risk factors for stroke

## 2019-08-29 NOTE — PHYSICAL THERAPY INITIAL EVALUATION ADULT - GENERAL OBSERVATIONS, REHAB EVAL
Pt is alert, cooperative, not in distress, not in distress and upbeat, follow simple one step commands with constant verbal and visual cues, confused and disoriented to time, place , person and situation.

## 2019-08-30 ENCOUNTER — TRANSCRIPTION ENCOUNTER (OUTPATIENT)
Age: 84
End: 2019-08-30

## 2019-08-30 PROCEDURE — 99233 SBSQ HOSP IP/OBS HIGH 50: CPT

## 2019-08-30 RX ADMIN — Medication 650 MILLIGRAM(S): at 01:15

## 2019-08-30 RX ADMIN — APIXABAN 2.5 MILLIGRAM(S): 2.5 TABLET, FILM COATED ORAL at 17:33

## 2019-08-30 RX ADMIN — Medication 650 MILLIGRAM(S): at 11:13

## 2019-08-30 RX ADMIN — Medication 650 MILLIGRAM(S): at 05:44

## 2019-08-30 RX ADMIN — POLYETHYLENE GLYCOL 3350 17 GRAM(S): 17 POWDER, FOR SOLUTION ORAL at 05:45

## 2019-08-30 RX ADMIN — Medication 650 MILLIGRAM(S): at 11:58

## 2019-08-30 RX ADMIN — POLYETHYLENE GLYCOL 3350 17 GRAM(S): 17 POWDER, FOR SOLUTION ORAL at 17:33

## 2019-08-30 RX ADMIN — Medication 650 MILLIGRAM(S): at 06:16

## 2019-08-30 RX ADMIN — APIXABAN 2.5 MILLIGRAM(S): 2.5 TABLET, FILM COATED ORAL at 05:45

## 2019-08-30 RX ADMIN — Medication 100 MILLIGRAM(S): at 05:45

## 2019-08-30 RX ADMIN — LOSARTAN POTASSIUM 25 MILLIGRAM(S): 100 TABLET, FILM COATED ORAL at 05:45

## 2019-08-30 RX ADMIN — ATORVASTATIN CALCIUM 40 MILLIGRAM(S): 80 TABLET, FILM COATED ORAL at 21:53

## 2019-08-30 RX ADMIN — Medication 100 MILLIGRAM(S): at 16:20

## 2019-08-30 RX ADMIN — Medication 650 MILLIGRAM(S): at 00:41

## 2019-08-30 NOTE — DISCHARGE NOTE PROVIDER - HOSPITAL COURSE
patient brought in  from [facility] for evaluation R/O stroke , as per nurse Chari patient had an episode of unresponsiveness  with some slurred speech  patient awake but patient had an similar episode on Monday also as per nurse - patient said that she feel fine but has dementia.    Problem/Plan - 1:    ·  Problem: TIA (transient ischemic attack).  Plan: neurology consult appreciated.     f/u mri head and EEG.     Problem/Plan - 2:    ·  Problem: Hyperlipidemia, unspecified hyperlipidemia type.  Plan: continue home meds.     Problem/Plan - 3:    ·  Problem: Essential hypertension.  Plan: continue home meds.     Problem/Plan - 4:    ·  Problem: Bradycardia.  Plan: cont to hold lopressor.     Problem/Plan - 5:    ·  Problem: Late onset Alzheimer's disease without behavioral disturbance. Plan: continue supportive care.    Problem/Plan - 6:    Problem: Preventive measure. Plan: DVTp: heparin     Disposition: STR.

## 2019-08-30 NOTE — DISCHARGE NOTE PROVIDER - NSDCCPCAREPLAN_GEN_ALL_CORE_FT
PRINCIPAL DISCHARGE DIAGNOSIS  Diagnosis: TIA (transient ischemic attack)  Assessment and Plan of Treatment: stable, to be discharge to [facility]      SECONDARY DISCHARGE DIAGNOSES  Diagnosis: Bradycardia  Assessment and Plan of Treatment: HOLD LOPRESSOR

## 2019-08-31 PROCEDURE — 99232 SBSQ HOSP IP/OBS MODERATE 35: CPT

## 2019-08-31 RX ORDER — HYDRALAZINE HCL 50 MG
10 TABLET ORAL
Refills: 0 | Status: DISCONTINUED | OUTPATIENT
Start: 2019-08-31 | End: 2019-09-04

## 2019-08-31 RX ADMIN — APIXABAN 2.5 MILLIGRAM(S): 2.5 TABLET, FILM COATED ORAL at 06:06

## 2019-08-31 RX ADMIN — APIXABAN 2.5 MILLIGRAM(S): 2.5 TABLET, FILM COATED ORAL at 17:35

## 2019-08-31 RX ADMIN — Medication 650 MILLIGRAM(S): at 23:55

## 2019-08-31 RX ADMIN — Medication 650 MILLIGRAM(S): at 06:06

## 2019-08-31 RX ADMIN — POLYETHYLENE GLYCOL 3350 17 GRAM(S): 17 POWDER, FOR SOLUTION ORAL at 17:35

## 2019-08-31 RX ADMIN — Medication 100 MILLIGRAM(S): at 17:35

## 2019-08-31 RX ADMIN — LOSARTAN POTASSIUM 25 MILLIGRAM(S): 100 TABLET, FILM COATED ORAL at 06:06

## 2019-08-31 RX ADMIN — Medication 650 MILLIGRAM(S): at 12:37

## 2019-08-31 RX ADMIN — Medication 650 MILLIGRAM(S): at 00:38

## 2019-08-31 RX ADMIN — Medication 650 MILLIGRAM(S): at 01:08

## 2019-08-31 RX ADMIN — Medication 650 MILLIGRAM(S): at 13:07

## 2019-08-31 RX ADMIN — Medication 650 MILLIGRAM(S): at 06:42

## 2019-08-31 RX ADMIN — Medication 10 MILLIGRAM(S): at 17:41

## 2019-08-31 RX ADMIN — SENNA PLUS 2 TABLET(S): 8.6 TABLET ORAL at 22:40

## 2019-08-31 RX ADMIN — ATORVASTATIN CALCIUM 40 MILLIGRAM(S): 80 TABLET, FILM COATED ORAL at 22:40

## 2019-08-31 RX ADMIN — Medication 650 MILLIGRAM(S): at 17:34

## 2019-08-31 RX ADMIN — Medication 100 MILLIGRAM(S): at 22:40

## 2019-09-01 PROCEDURE — 99232 SBSQ HOSP IP/OBS MODERATE 35: CPT

## 2019-09-01 RX ORDER — HYDRALAZINE HCL 50 MG
10 TABLET ORAL EVERY 6 HOURS
Refills: 0 | Status: DISCONTINUED | OUTPATIENT
Start: 2019-09-01 | End: 2019-09-04

## 2019-09-01 RX ADMIN — Medication 650 MILLIGRAM(S): at 17:42

## 2019-09-01 RX ADMIN — Medication 650 MILLIGRAM(S): at 23:30

## 2019-09-01 RX ADMIN — APIXABAN 2.5 MILLIGRAM(S): 2.5 TABLET, FILM COATED ORAL at 05:22

## 2019-09-01 RX ADMIN — Medication 10 MILLIGRAM(S): at 17:43

## 2019-09-01 RX ADMIN — Medication 650 MILLIGRAM(S): at 13:11

## 2019-09-01 RX ADMIN — SENNA PLUS 2 TABLET(S): 8.6 TABLET ORAL at 23:03

## 2019-09-01 RX ADMIN — Medication 650 MILLIGRAM(S): at 12:41

## 2019-09-01 RX ADMIN — Medication 650 MILLIGRAM(S): at 00:00

## 2019-09-01 RX ADMIN — Medication 100 MILLIGRAM(S): at 05:23

## 2019-09-01 RX ADMIN — Medication 650 MILLIGRAM(S): at 06:16

## 2019-09-01 RX ADMIN — LOSARTAN POTASSIUM 25 MILLIGRAM(S): 100 TABLET, FILM COATED ORAL at 05:24

## 2019-09-01 RX ADMIN — Medication 650 MILLIGRAM(S): at 23:03

## 2019-09-01 RX ADMIN — Medication 100 MILLIGRAM(S): at 23:03

## 2019-09-01 RX ADMIN — POLYETHYLENE GLYCOL 3350 17 GRAM(S): 17 POWDER, FOR SOLUTION ORAL at 05:24

## 2019-09-01 RX ADMIN — Medication 650 MILLIGRAM(S): at 05:22

## 2019-09-01 RX ADMIN — Medication 100 MILLIGRAM(S): at 12:41

## 2019-09-01 RX ADMIN — APIXABAN 2.5 MILLIGRAM(S): 2.5 TABLET, FILM COATED ORAL at 17:43

## 2019-09-01 RX ADMIN — ATORVASTATIN CALCIUM 40 MILLIGRAM(S): 80 TABLET, FILM COATED ORAL at 23:03

## 2019-09-01 RX ADMIN — Medication 10 MILLIGRAM(S): at 05:23

## 2019-09-01 RX ADMIN — POLYETHYLENE GLYCOL 3350 17 GRAM(S): 17 POWDER, FOR SOLUTION ORAL at 17:43

## 2019-09-02 PROCEDURE — 99232 SBSQ HOSP IP/OBS MODERATE 35: CPT

## 2019-09-02 RX ORDER — ATORVASTATIN CALCIUM 80 MG/1
20 TABLET, FILM COATED ORAL AT BEDTIME
Refills: 0 | Status: DISCONTINUED | OUTPATIENT
Start: 2019-09-02 | End: 2019-09-04

## 2019-09-02 RX ADMIN — ATORVASTATIN CALCIUM 20 MILLIGRAM(S): 80 TABLET, FILM COATED ORAL at 21:28

## 2019-09-02 RX ADMIN — Medication 100 MILLIGRAM(S): at 21:28

## 2019-09-02 RX ADMIN — APIXABAN 2.5 MILLIGRAM(S): 2.5 TABLET, FILM COATED ORAL at 05:37

## 2019-09-02 RX ADMIN — Medication 650 MILLIGRAM(S): at 18:05

## 2019-09-02 RX ADMIN — POLYETHYLENE GLYCOL 3350 17 GRAM(S): 17 POWDER, FOR SOLUTION ORAL at 17:08

## 2019-09-02 RX ADMIN — Medication 100 MILLIGRAM(S): at 13:01

## 2019-09-02 RX ADMIN — Medication 650 MILLIGRAM(S): at 11:14

## 2019-09-02 RX ADMIN — LOSARTAN POTASSIUM 25 MILLIGRAM(S): 100 TABLET, FILM COATED ORAL at 05:38

## 2019-09-02 RX ADMIN — Medication 650 MILLIGRAM(S): at 17:07

## 2019-09-02 RX ADMIN — APIXABAN 2.5 MILLIGRAM(S): 2.5 TABLET, FILM COATED ORAL at 17:07

## 2019-09-02 RX ADMIN — Medication 10 MILLIGRAM(S): at 05:38

## 2019-09-02 RX ADMIN — SENNA PLUS 2 TABLET(S): 8.6 TABLET ORAL at 21:28

## 2019-09-02 RX ADMIN — Medication 650 MILLIGRAM(S): at 05:37

## 2019-09-02 RX ADMIN — Medication 650 MILLIGRAM(S): at 12:21

## 2019-09-02 RX ADMIN — POLYETHYLENE GLYCOL 3350 17 GRAM(S): 17 POWDER, FOR SOLUTION ORAL at 05:38

## 2019-09-02 RX ADMIN — Medication 100 MILLIGRAM(S): at 05:37

## 2019-09-03 PROCEDURE — 99232 SBSQ HOSP IP/OBS MODERATE 35: CPT

## 2019-09-03 RX ORDER — ACETAMINOPHEN 500 MG
650 TABLET ORAL EVERY 6 HOURS
Refills: 0 | Status: DISCONTINUED | OUTPATIENT
Start: 2019-09-03 | End: 2019-09-04

## 2019-09-03 RX ADMIN — POLYETHYLENE GLYCOL 3350 17 GRAM(S): 17 POWDER, FOR SOLUTION ORAL at 05:31

## 2019-09-03 RX ADMIN — ATORVASTATIN CALCIUM 20 MILLIGRAM(S): 80 TABLET, FILM COATED ORAL at 21:41

## 2019-09-03 RX ADMIN — Medication 10 MILLIGRAM(S): at 05:31

## 2019-09-03 RX ADMIN — LOSARTAN POTASSIUM 25 MILLIGRAM(S): 100 TABLET, FILM COATED ORAL at 05:31

## 2019-09-03 RX ADMIN — APIXABAN 2.5 MILLIGRAM(S): 2.5 TABLET, FILM COATED ORAL at 18:05

## 2019-09-03 RX ADMIN — Medication 650 MILLIGRAM(S): at 05:30

## 2019-09-03 RX ADMIN — Medication 100 MILLIGRAM(S): at 13:18

## 2019-09-03 RX ADMIN — Medication 100 MILLIGRAM(S): at 21:42

## 2019-09-03 RX ADMIN — SENNA PLUS 2 TABLET(S): 8.6 TABLET ORAL at 21:41

## 2019-09-03 RX ADMIN — APIXABAN 2.5 MILLIGRAM(S): 2.5 TABLET, FILM COATED ORAL at 05:30

## 2019-09-03 RX ADMIN — Medication 10 MILLIGRAM(S): at 18:05

## 2019-09-03 RX ADMIN — Medication 100 MILLIGRAM(S): at 05:31

## 2019-09-03 NOTE — PROGRESS NOTE ADULT - PROBLEM/PLAN-3
DISPLAY PLAN FREE TEXT
Spontaneous, unlabored and symmetrical

## 2019-09-03 NOTE — PROGRESS NOTE ADULT - PROBLEM SELECTOR PLAN 4
cont to hold lopressor

## 2019-09-03 NOTE — PROGRESS NOTE ADULT - PROBLEM SELECTOR PLAN 2
continue home meds

## 2019-09-03 NOTE — PROGRESS NOTE ADULT - ASSESSMENT
89F sent from skilled nursing facility with unresponsiveness with slurred speech - no info from patient   patient will be seen by neurology     IMPROVE VTE Individual Risk Assessment        RISK                                                          Points  [  ] Previous VTE                                                3  [  ] Thrombophilia                                             2  [  ] Lower limb paralysis                                   2        (unable to hold up >15 seconds)    [  ] Current Cancer                                            2         (within 6 months)  [  ] Immobilization > 24 hrs                              1  [  ] ICU/CCU stay > 24 hours                            1  [  ] Age > 60                                                    1  IMPROVE VTE Score ______2___
Abilify; amphetamine mixed salts; Klonopin, trazodone, venlafaxine, epakote 1000mg BID, Duloxetine 60mg BID, Clozapine 350 mg qHS, zydis up to 30mg po qhs
Abilify; amphetamine mixed salts; Klonopin, trazodone, venlafaxine, depakote 1000mg BID, Duloxetine 60mg BID, Clozapine 350 mg qHS, zydis up to 30mg po qhs

## 2019-09-03 NOTE — PROGRESS NOTE ADULT - PROBLEM SELECTOR PLAN 6
DVTp: heparin   Disposition: medically cleared for discharge pending auth [facility].
DVTp: heparin   Disposition: medically cleared for discharge pending auth [facility].
DVTp: heparin   Disposition: medically cleared for discharge pending auth [facility].
DVTp: heparin   Disposition: medically cleared for discharge pending auth [facility].
DVTp: heparin   Disposition: medically cleared for discharge pending auth [facility].
DVTp: heparin   Disposition: STR.

## 2019-09-03 NOTE — PROGRESS NOTE ADULT - PROBLEM SELECTOR PLAN 1
neurology consult appreciated.   MRI shows no acute changes  EEG done; results pending.   seems more likely like episode was absent seizure.
neurology consult appreciated.   f/u mri head and EEG

## 2019-09-03 NOTE — PROGRESS NOTE ADULT - PROBLEM SELECTOR PROBLEM 1
TIA (transient ischemic attack)

## 2019-09-04 VITALS
HEART RATE: 56 BPM | DIASTOLIC BLOOD PRESSURE: 50 MMHG | OXYGEN SATURATION: 94 % | RESPIRATION RATE: 17 BRPM | SYSTOLIC BLOOD PRESSURE: 116 MMHG | TEMPERATURE: 99 F

## 2019-09-04 LAB
ANION GAP SERPL CALC-SCNC: 8 MMOL/L — SIGNIFICANT CHANGE UP (ref 5–17)
BUN SERPL-MCNC: 28 MG/DL — HIGH (ref 7–23)
CALCIUM SERPL-MCNC: 9.1 MG/DL — SIGNIFICANT CHANGE UP (ref 8.5–10.1)
CHLORIDE SERPL-SCNC: 103 MMOL/L — SIGNIFICANT CHANGE UP (ref 96–108)
CO2 SERPL-SCNC: 27 MMOL/L — SIGNIFICANT CHANGE UP (ref 22–31)
CREAT SERPL-MCNC: 0.59 MG/DL — SIGNIFICANT CHANGE UP (ref 0.5–1.3)
GLUCOSE SERPL-MCNC: 101 MG/DL — HIGH (ref 70–99)
HCT VFR BLD CALC: 44.3 % — SIGNIFICANT CHANGE UP (ref 34.5–45)
HGB BLD-MCNC: 13.8 G/DL — SIGNIFICANT CHANGE UP (ref 11.5–15.5)
MCHC RBC-ENTMCNC: 28.3 PG — SIGNIFICANT CHANGE UP (ref 27–34)
MCHC RBC-ENTMCNC: 31.2 GM/DL — LOW (ref 32–36)
MCV RBC AUTO: 91 FL — SIGNIFICANT CHANGE UP (ref 80–100)
NRBC # BLD: 0 /100 WBCS — SIGNIFICANT CHANGE UP (ref 0–0)
PLATELET # BLD AUTO: 195 K/UL — SIGNIFICANT CHANGE UP (ref 150–400)
POTASSIUM SERPL-MCNC: 4.1 MMOL/L — SIGNIFICANT CHANGE UP (ref 3.5–5.3)
POTASSIUM SERPL-SCNC: 4.1 MMOL/L — SIGNIFICANT CHANGE UP (ref 3.5–5.3)
RBC # BLD: 4.87 M/UL — SIGNIFICANT CHANGE UP (ref 3.8–5.2)
RBC # FLD: 14.7 % — HIGH (ref 10.3–14.5)
SODIUM SERPL-SCNC: 138 MMOL/L — SIGNIFICANT CHANGE UP (ref 135–145)
WBC # BLD: 6.95 K/UL — SIGNIFICANT CHANGE UP (ref 3.8–10.5)
WBC # FLD AUTO: 6.95 K/UL — SIGNIFICANT CHANGE UP (ref 3.8–10.5)

## 2019-09-04 PROCEDURE — 99239 HOSP IP/OBS DSCHRG MGMT >30: CPT

## 2019-09-04 RX ORDER — HYDRALAZINE HCL 50 MG
1 TABLET ORAL
Qty: 0 | Refills: 0 | DISCHARGE
Start: 2019-09-04

## 2019-09-04 RX ADMIN — Medication 100 MILLIGRAM(S): at 06:14

## 2019-09-04 RX ADMIN — APIXABAN 2.5 MILLIGRAM(S): 2.5 TABLET, FILM COATED ORAL at 17:33

## 2019-09-04 RX ADMIN — Medication 100 MILLIGRAM(S): at 14:03

## 2019-09-04 RX ADMIN — Medication 10 MILLIGRAM(S): at 06:14

## 2019-09-04 RX ADMIN — LOSARTAN POTASSIUM 25 MILLIGRAM(S): 100 TABLET, FILM COATED ORAL at 06:14

## 2019-09-04 RX ADMIN — POLYETHYLENE GLYCOL 3350 17 GRAM(S): 17 POWDER, FOR SOLUTION ORAL at 06:14

## 2019-09-04 RX ADMIN — APIXABAN 2.5 MILLIGRAM(S): 2.5 TABLET, FILM COATED ORAL at 06:14

## 2019-09-04 NOTE — DIETITIAN INITIAL EVALUATION ADULT. - PHYSICAL APPEARANCE
BMI = 20.4; no edema noted/underweight/other (specify) Nutrition focused physical exam conducted:    Subcutaneous fat loss: [ moderate] Orbital fat pads region, [WDL ]Buccal fat region, [ severe]Triceps region,  [WDL ]Ribs region.    Muscle wasting: [moderate ]Temples region, [moderate ]Clavicle region, [moderate ]Shoulder region,   [mild ]Scapula region, [mild ]Interosseous region,  [ moderate]thigh region, [ moderate]Calf region

## 2019-09-04 NOTE — DIETITIAN INITIAL EVALUATION ADULT. - PERTINENT LABORATORY DATA
09-04 Na138 mmol/L Glu 101 mg/dL<H> K+ 4.1 mmol/L Cr  0.59 mg/dL BUN 28 mg/dL<H> 08-29 [id number] 6.8 %<H> 08-29 Chol 149 mg/dL LDL 82 mg/dL HDL 53 mg/dL Trig 68 mg/dL

## 2019-09-04 NOTE — PROGRESS NOTE ADULT - REASON FOR ADMISSION
unresponsiveness

## 2019-09-04 NOTE — CHART NOTE - NSCHARTNOTEFT_GEN_A_CORE
Upon Nutritional Assessment by the Registered Dietitian your patient was determined to meet criteria / has evidence of the following diagnosis/diagnoses:          [ ]  Mild Protein Calorie Malnutrition        [X ]  Moderate Protein Calorie Malnutrition        [ ] Severe Protein Calorie Malnutrition        [ ] Unspecified Protein Calorie Malnutrition        [ ] Underweight / BMI <19        [ ] Morbid Obesity / BMI > 40      Findings as based on:  •  Comprehensive nutrition assessment and consultation  •  Calorie counts (nutrient intake analysis)  •  Food acceptance and intake status from observations by staff  •  Follow up  •  Patient education  •  Intervention secondary to interdisciplinary rounds  •   concerns      Treatment:    The following diet has been recommended:  [facility] c snack diet + Glucerna x 1/day (provides 220 kcal, 10 g protein) for additional nutrition support      PROVIDER Section:     By signing this assessment you are acknowledging and agree with the diagnosis/diagnoses assigned by the Registered Dietitian    Comments:

## 2019-09-04 NOTE — PROGRESS NOTE ADULT - PROVIDER SPECIALTY LIST ADULT
Hospitalist
Neurology
Hospitalist

## 2019-09-04 NOTE — DIETITIAN INITIAL EVALUATION ADULT. - ENERGY NEEDS
Height (cm): 162.56  Weight (kg): 54.2 (09-04)  BMI = 20.4  IBW:  54.5 kg +/- 10%   % IBW:  100%     UBW:   44.8 kg     %UBW: 120%

## 2019-09-04 NOTE — DIETITIAN INITIAL EVALUATION ADULT. - DIET TYPE
supplement (specify)/Glucerna x 1/day (provides 220 kcal, 10 g protein)/consistent carbohydrate (evening snack)

## 2019-09-04 NOTE — PROGRESS NOTE ADULT - SUBJECTIVE AND OBJECTIVE BOX
Patient is a 89y old  Female who presents with a chief complaint of unresponsiveness (30 Aug 2019 16:41)      INTERVAL HPI/ OVERNIGHT EVENTS: Pt was seen and examined at bedside today, No significant overnight events, pt denies any complaints.      MEDICATIONS  (STANDING):  acetaminophen   Tablet .. 650 milliGRAM(s) Oral every 6 hours  apixaban 2.5 milliGRAM(s) Oral every 12 hours  atorvastatin 40 milliGRAM(s) Oral at bedtime  docusate sodium 100 milliGRAM(s) Oral three times a day  hydrALAZINE 10 milliGRAM(s) Oral two times a day  losartan 25 milliGRAM(s) Oral daily  polyethylene glycol 3350 17 Gram(s) Oral two times a day  senna 2 Tablet(s) Oral at bedtime    MEDICATIONS  (PRN):      Allergies    No Known Allergies    Intolerances        REVIEW OF SYSTEMS:    Unable to examine due to [ ] Encephalopathy [ ] Advanced Dementia [ ] Expressive Aphasia [ ] Non-verbal patient    CONSTITUTIONAL: No fever, NO generalized weakness/Fatigue, No weight loss  EYES: No eye pain, visual disturbances, or discharge  ENMT:  No difficulty hearing, tinnitus, vertigo; No sinus or throat pain  NECK: No pain or stiffness  RESPIRATORY: No shortness of breath,  cough, wheezing, sputum or hemoptysis   CARDIOVASCULAR: No chest pain, palpitations, or leg swelling  GASTROINTESTINAL: No abdominal pain. No nausea, vomiting, diarrhea or constipation. No melena or hematochezia.  GENITOURINARY: No dysuria, frequency, hematuria, or incontinence  NEUROLOGICAL: No headaches, Dizziness, memory loss, loss of strength, numbness, or tremors  SKIN: No itching, burning, rashes, or lesions   MUSCULOSKELETAL: No joint pain or swelling; No muscle, back, or extremity pain  PSYCHIATRIC: No depression, anxiety, mood swings, or difficulty sleeping  HEME/LYMPH: No easy bruising, or bleeding gums      Vital Signs Last 24 Hrs  T(C): 36.6 (31 Aug 2019 11:36), Max: 36.7 (30 Aug 2019 17:25)  T(F): 97.9 (31 Aug 2019 11:36), Max: 98 (30 Aug 2019 17:25)  HR: 65 (31 Aug 2019 11:36) (55 - 65)  BP: 109/64 (31 Aug 2019 11:36) (109/64 - 177/70)  BP(mean): --  RR: 17 (31 Aug 2019 11:36) (17 - 18)  SpO2: 96% (31 Aug 2019 11:36) (93% - 96%)    PHYSICAL EXAM:  GENERAL: NAD, well-developed, well-groomed  HEAD:  Atraumatic, Normocephalic  EYES: conjunctiva and sclera clear  ENMT: Moist mucous membranes  NECK: Supple, No JVD, Normal thyroid  CHEST/LUNG: Clear to Auscultation bilaterally; No rales, rhonchi, wheezing, or rubs  HEART: Regular rate and rhythm; No murmurs, rubs, or gallops  ABDOMEN: Soft, Nontender, Nondistended; Bowel sounds present  EXTREMITIES:  2+ Peripheral Pulses, No clubbing, cyanosis, or edema  SKIN: No rashes or lesions  NERVOUS SYSTEM:  Alert & Oriented X2, Good concentration; Motor Strength 5/5 B/L upper and lower extremities    LABS:              CAPILLARY BLOOD GLUCOSE              RADIOLOGY & ADDITIONAL TESTS:          Imaging Personally Reviewed:  [ ] YES  [ ] NO    Consultant(s) Notes Reviewed:  [ ] YES  [ ] NO    Care Discussed with Consultants/Other Providers [x ] YES  [ ] NO
Neurology Progress Note    No acute events.     Neuro Exam: AOx1. Follows commands. Mild left facial droop. Bilateral leg weakness with more weakness in the left leg.     A/P:  Possible TIA/stroke  Possible seizure  A-fib  HTN  HLD  Dementia    - MRI brain to rule out stroke.   - Eliquis and Lipitor for secondary stroke prevention.   - EEG to rule out seizure as she had a similar episode on 8/22/19 noted by her daughter-in-law.
Neurology Progress Note    No acute events.     Neuro Exam: AOx1. Follows commands. Mild left facial droop. Bilateral leg weakness with more weakness in the left leg.     MRI brain- no acute process    A/P:  Possible seizure  A-fib  HTN  HLD  Dementia    - Episode on admission probably seizure as she had a similar episode on 8/22/19 noted by her daughter-in-law. EEG pending. Not enough evidence to start AED.  - Eliquis and Lipitor for secondary stroke prevention.
Neurology Progress Note    No acute events.     Neuro Exam: AOx1. Follows commands. Mild left facial droop. Bilateral leg weakness with more weakness in the left leg.     MRI brain- no acute process  A/P:  Possible seizure  A-fib  HTN  HLD  Dementia    - Episode on admission probably seizure as she had a similar episode on 8/22/19 noted by her daughter-in-law. . EEG pending.   - Eliquis and Lipitor for secondary stroke prevention.
Neurology Progress Note    No acute events.     Neuro Exam: AOx1. Follows commands. Mild left facial droop. Bilateral leg weakness with more weakness in the left leg.     MRI brain- no acute process  A/P:  Possible seizure  A-fib  HTN  HLD  Dementia    - Episode on admission probably seizure as she had a similar episode on 8/22/19 noted by her daughter-in-law. EEG pending. Not enough evidence to start AED.  - Eliquis and Lipitor for secondary stroke prevention.   - discussed with son
Neurology Progress Note    No acute events.     Neuro Exam: AOx1. Follows commands. No facial droop. Moving arms and legs in bed. No focal weakness.    MRI brain- no acute process    A/P:  Possible seizure  A-fib  HTN  HLD  Dementia    - Episode on admission probably seizure as she had a similar episode on 8/22/19 noted by her daughter-in-law. EEG pending. Not enough evidence to start AED.  - Eliquis and Lipitor for secondary stroke prevention.
Neurology Progress Note    No acute events.     Neuro Exam: AOx1. Follows commands. No facial droop. Moving arms and legs in bed. No focal weakness.    MRI brain- no acute process    A/P:  Possible seizure  A-fib  HTN  HLD  Dementia    - Episode on admission probably seizure as she had a similar episode on 8/22/19 noted by her daughter-in-law. EEG pending. Not enough evidence to start AED.  - Eliquis and Lipitor for secondary stroke prevention.
Neurology Progress Note    No acute events.     Neuro Exam: AOx1. Follows commands. No facial droop. Moving arms and legs in bed. No focal weakness.    MRI brain- no acute process  EEG- no seizure focus    A/P:  Possible seizure  A-fib  HTN  HLD  Dementia    - Episode on admission probably seizure as she had a similar episode on 8/22/19 noted by her daughter-in-law. EEG shows no seizure focus. Not enough evidence to start AED.  - Eliquis and Lipitor for secondary stroke prevention.   - D/C planning
Patient is a 89y old  Female who presents with a chief complaint of unresponsiveness (01 Sep 2019 12:50)      INTERVAL HPI/ OVERNIGHT EVENTS: Pt was seen and examined at bedside today, No significant overnight events, pt denies any complaints.      MEDICATIONS  (STANDING):  acetaminophen   Tablet .. 650 milliGRAM(s) Oral every 6 hours  apixaban 2.5 milliGRAM(s) Oral every 12 hours  atorvastatin 40 milliGRAM(s) Oral at bedtime  docusate sodium 100 milliGRAM(s) Oral three times a day  hydrALAZINE 10 milliGRAM(s) Oral two times a day  losartan 25 milliGRAM(s) Oral daily  polyethylene glycol 3350 17 Gram(s) Oral two times a day  senna 2 Tablet(s) Oral at bedtime    MEDICATIONS  (PRN):  hydrALAZINE Injectable 10 milliGRAM(s) IV Push every 6 hours PRN SBP > 165      Allergies    No Known Allergies    Intolerances        REVIEW OF SYSTEMS:    Unable to examine due to [ ] Encephalopathy [ ] Advanced Dementia [ ] Expressive Aphasia [ ] Non-verbal patient    CONSTITUTIONAL: No fever, NO generalized weakness/Fatigue, No weight loss  EYES: No eye pain, visual disturbances, or discharge  ENMT:  No difficulty hearing, tinnitus, vertigo; No sinus or throat pain  NECK: No pain or stiffness  RESPIRATORY: No shortness of breath,  cough, wheezing, sputum or hemoptysis   CARDIOVASCULAR: No chest pain, palpitations, or leg swelling  GASTROINTESTINAL: No abdominal pain. No nausea, vomiting, diarrhea or constipation. No melena or hematochezia.  GENITOURINARY: No dysuria, frequency, hematuria, or incontinence  NEUROLOGICAL: No headaches, Dizziness, memory loss, loss of strength, numbness, or tremors  SKIN: No itching, burning, rashes, or lesions   MUSCULOSKELETAL: No joint pain or swelling; No muscle, back, or extremity pain  PSYCHIATRIC: No depression, anxiety, mood swings, or difficulty sleeping  HEME/LYMPH: No easy bruising, or bleeding gums      Vital Signs Last 24 Hrs  T(C): 36.8 (01 Sep 2019 11:16), Max: 36.8 (01 Sep 2019 11:16)  T(F): 98.2 (01 Sep 2019 11:16), Max: 98.2 (01 Sep 2019 11:16)  HR: 64 (01 Sep 2019 11:16) (53 - 64)  BP: 120/53 (01 Sep 2019 11:16) (120/53 - 195/71)  BP(mean): --  RR: 17 (01 Sep 2019 11:16) (16 - 17)  SpO2: 94% (01 Sep 2019 11:16) (94% - 95%)    PHYSICAL EXAM:  GENERAL: NAD, well-developed, well-groomed  HEAD:  Atraumatic, Normocephalic  EYES: conjunctiva and sclera clear  ENMT: Moist mucous membranes  NECK: Supple, No JVD, Normal thyroid  CHEST/LUNG: Clear to Auscultation bilaterally; No rales, rhonchi, wheezing, or rubs  HEART: Regular rate and rhythm; No murmurs, rubs, or gallops  ABDOMEN: Soft, Nontender, Nondistended; Bowel sounds present  EXTREMITIES:  2+ Peripheral Pulses, No clubbing, cyanosis, or edema  SKIN: No rashes or lesions  NERVOUS SYSTEM:  Alert & Oriented X2, Good concentration; Motor Strength 5/5 B/L upper and lower extremities    LABS:              CAPILLARY BLOOD GLUCOSE              RADIOLOGY & ADDITIONAL TESTS:          Imaging Personally Reviewed:  [ ] YES  [ ] NO    Consultant(s) Notes Reviewed:  [x ] YES  [ ] NO    Care Discussed with Consultants/Other Providers [x ] YES  [ ] NO
Patient is a 89y old  Female who presents with a chief complaint of unresponsiveness (01 Sep 2019 12:53)      INTERVAL HPI/ OVERNIGHT EVENTS: Pt was seen and examined at bedside today, No significant overnight events, pt denies any complaints.      MEDICATIONS  (STANDING):  acetaminophen   Tablet .. 650 milliGRAM(s) Oral every 6 hours  apixaban 2.5 milliGRAM(s) Oral every 12 hours  atorvastatin 40 milliGRAM(s) Oral at bedtime  docusate sodium 100 milliGRAM(s) Oral three times a day  hydrALAZINE 10 milliGRAM(s) Oral two times a day  losartan 25 milliGRAM(s) Oral daily  polyethylene glycol 3350 17 Gram(s) Oral two times a day  senna 2 Tablet(s) Oral at bedtime    MEDICATIONS  (PRN):  hydrALAZINE Injectable 10 milliGRAM(s) IV Push every 6 hours PRN SBP > 165      Allergies    No Known Allergies    Intolerances        REVIEW OF SYSTEMS:    Unable to examine due to [ ] Encephalopathy [ ] Advanced Dementia [ ] Expressive Aphasia [ ] Non-verbal patient    CONSTITUTIONAL: No fever, NO generalized weakness/Fatigue, No weight loss  EYES: No eye pain, visual disturbances, or discharge  ENMT:  No difficulty hearing, tinnitus, vertigo; No sinus or throat pain  NECK: No pain or stiffness  RESPIRATORY: No shortness of breath,  cough, wheezing, sputum or hemoptysis   CARDIOVASCULAR: No chest pain, palpitations, or leg swelling  GASTROINTESTINAL: No abdominal pain. No nausea, vomiting, diarrhea or constipation. No melena or hematochezia.  GENITOURINARY: No dysuria, frequency, hematuria, or incontinence  NEUROLOGICAL: No headaches, Dizziness, memory loss, loss of strength, numbness, or tremors  SKIN: No itching, burning, rashes, or lesions   MUSCULOSKELETAL: No joint pain or swelling; No muscle, back, or extremity pain  PSYCHIATRIC: No depression, anxiety, mood swings, or difficulty sleeping  HEME/LYMPH: No easy bruising, or bleeding gums      Vital Signs Last 24 Hrs  T(C): 36.6 (02 Sep 2019 11:15), Max: 36.7 (01 Sep 2019 23:12)  T(F): 97.8 (02 Sep 2019 11:15), Max: 98.1 (01 Sep 2019 23:12)  HR: 56 (02 Sep 2019 11:15) (54 - 61)  BP: 124/51 (02 Sep 2019 11:15) (124/51 - 164/56)  BP(mean): --  RR: 19 (02 Sep 2019 11:15) (17 - 19)  SpO2: 94% (02 Sep 2019 11:15) (94% - 96%)    PHYSICAL EXAM:  GENERAL: NAD, well-developed, well-groomed  HEAD:  Atraumatic, Normocephalic  EYES: conjunctiva and sclera clear  ENMT: Moist mucous membranes  NECK: Supple, No JVD, Normal thyroid  CHEST/LUNG: Clear to Auscultation bilaterally; No rales, rhonchi, wheezing, or rubs  HEART: Regular rate and rhythm; No murmurs, rubs, or gallops  ABDOMEN: Soft, Nontender, Nondistended; Bowel sounds present  EXTREMITIES:  2+ Peripheral Pulses, No clubbing, cyanosis, or edema  SKIN: No rashes or lesions  NERVOUS SYSTEM:  Alert & Oriented X2, Good concentration; Motor Strength 5/5 B/L upper and lower extremities    LABS:              CAPILLARY BLOOD GLUCOSE              RADIOLOGY & ADDITIONAL TESTS:          Imaging Personally Reviewed:  [ ] YES  [ ] NO    Consultant(s) Notes Reviewed:  [ ] YES  [ ] NO    Care Discussed with Consultants/Other Providers [x ] YES  [ ] NO
Patient is a 89y old  Female who presents with a chief complaint of unresponsiveness (02 Sep 2019 12:28)      INTERVAL HPI/ OVERNIGHT EVENTS: Pt was seen and examined at bedside today, No significant overnight events, pt denies any complaints.      MEDICATIONS  (STANDING):  apixaban 2.5 milliGRAM(s) Oral every 12 hours  atorvastatin 20 milliGRAM(s) Oral at bedtime  docusate sodium 100 milliGRAM(s) Oral three times a day  hydrALAZINE 10 milliGRAM(s) Oral two times a day  losartan 25 milliGRAM(s) Oral daily  polyethylene glycol 3350 17 Gram(s) Oral two times a day  senna 2 Tablet(s) Oral at bedtime    MEDICATIONS  (PRN):  hydrALAZINE Injectable 10 milliGRAM(s) IV Push every 6 hours PRN SBP > 165      Allergies    No Known Allergies    Intolerances        REVIEW OF SYSTEMS:    Unable to examine due to [ ] Encephalopathy [ ] Advanced Dementia [ ] Expressive Aphasia [ ] Non-verbal patient    CONSTITUTIONAL: No fever, NO generalized weakness/Fatigue, No weight loss  EYES: No eye pain, visual disturbances, or discharge  ENMT:  No difficulty hearing, tinnitus, vertigo; No sinus or throat pain  NECK: No pain or stiffness  RESPIRATORY: No shortness of breath,  cough, wheezing, sputum or hemoptysis   CARDIOVASCULAR: No chest pain, palpitations, or leg swelling  GASTROINTESTINAL: No abdominal pain. No nausea, vomiting, diarrhea or constipation. No melena or hematochezia.  GENITOURINARY: No dysuria, frequency, hematuria, or incontinence  NEUROLOGICAL: No headaches, Dizziness, memory loss, loss of strength, numbness, or tremors  SKIN: No itching, burning, rashes, or lesions   MUSCULOSKELETAL: No joint pain or swelling; No muscle, back, or extremity pain  PSYCHIATRIC: No depression, anxiety, mood swings, or difficulty sleeping  HEME/LYMPH: No easy bruising, or bleeding gums        Vital Signs Last 24 Hrs  T(C): 36.8 (03 Sep 2019 11:58), Max: 36.8 (03 Sep 2019 11:58)  T(F): 98.2 (03 Sep 2019 11:58), Max: 98.2 (03 Sep 2019 11:58)  HR: 60 (03 Sep 2019 11:58) (56 - 60)  BP: 126/51 (03 Sep 2019 11:58) (112/60 - 153/67)  BP(mean): --  RR: 16 (03 Sep 2019 11:58) (16 - 18)  SpO2: 93% (03 Sep 2019 11:58) (93% - 97%)    PHYSICAL EXAM:  GENERAL: NAD, well-developed, well-groomed  HEAD:  Atraumatic, Normocephalic  EYES: conjunctiva and sclera clear  ENMT: Moist mucous membranes  NECK: Supple, No JVD, Normal thyroid  CHEST/LUNG: Clear to Auscultation bilaterally; No rales, rhonchi, wheezing, or rubs  HEART: Regular rate and rhythm; No murmurs, rubs, or gallops  ABDOMEN: Soft, Nontender, Nondistended; Bowel sounds present  EXTREMITIES:  2+ Peripheral Pulses, No clubbing, cyanosis, or edema  SKIN: No rashes or lesions  NERVOUS SYSTEM:  Alert & Oriented X2, Good concentration; Motor Strength 5/5 B/L upper and lower extremities    LABS:              CAPILLARY BLOOD GLUCOSE              RADIOLOGY & ADDITIONAL TESTS:          Imaging Personally Reviewed:  [ ] YES  [ ] NO    Consultant(s) Notes Reviewed:  [x ] YES  [ ] NO    Care Discussed with Consultants/Other Providers [x ] YES  [ ] NO
Patient is a 89y old  Female who presents with a chief complaint of unresponsiveness (29 Aug 2019 15:16)      INTERVAL HPI/ OVERNIGHT EVENTS: Pt was seen and examined at bedside today, No significant overnight events, pt denies any complaints at this time.      MEDICATIONS  (STANDING):  acetaminophen   Tablet .. 650 milliGRAM(s) Oral every 6 hours  apixaban 2.5 milliGRAM(s) Oral every 12 hours  atorvastatin 40 milliGRAM(s) Oral at bedtime  docusate sodium 100 milliGRAM(s) Oral three times a day  losartan 25 milliGRAM(s) Oral daily  polyethylene glycol 3350 17 Gram(s) Oral two times a day  senna 2 Tablet(s) Oral at bedtime    MEDICATIONS  (PRN):      Allergies    No Known Allergies    Intolerances        REVIEW OF SYSTEMS:    Unable to examine due to [ ] Encephalopathy [ ] Advanced Dementia [ ] Expressive Aphasia [ ] Non-verbal patient    CONSTITUTIONAL: No fever, NO generalized weakness/Fatigue, No weight loss  EYES: No eye pain, visual disturbances, or discharge  ENMT:  No difficulty hearing, tinnitus, vertigo; No sinus or throat pain  NECK: No pain or stiffness  RESPIRATORY: No shortness of breath,  cough, wheezing, sputum or hemoptysis   CARDIOVASCULAR: No chest pain, palpitations, or leg swelling  GASTROINTESTINAL: No abdominal pain. No nausea, vomiting, diarrhea or constipation. No melena or hematochezia.  GENITOURINARY: No dysuria, frequency, hematuria, or incontinence  NEUROLOGICAL: No headaches, Dizziness, memory loss, loss of strength, numbness, or tremors  SKIN: No itching, burning, rashes, or lesions   MUSCULOSKELETAL: No joint pain or swelling; No muscle, back, or extremity pain  PSYCHIATRIC: No depression, anxiety, mood swings, or difficulty sleeping  HEME/LYMPH: No easy bruising, or bleeding gums      Vital Signs Last 24 Hrs  T(C): 37.2 (29 Aug 2019 17:00), Max: 37.2 (29 Aug 2019 17:00)  T(F): 99 (29 Aug 2019 17:00), Max: 99 (29 Aug 2019 17:00)  HR: 66 (29 Aug 2019 17:00) (53 - 67)  BP: 138/55 (29 Aug 2019 17:00) (132/70 - 187/76)  BP(mean): --  RR: 16 (29 Aug 2019 17:00) (16 - 18)  SpO2: 94% (29 Aug 2019 17:00) (93% - 97%)    PHYSICAL EXAM:  GENERAL: NAD, well-developed, well-groomed  HEAD:  Atraumatic, Normocephalic  EYES: conjunctiva and sclera clear  ENMT: Moist mucous membranes  NECK: Supple, No JVD, Normal thyroid  CHEST/LUNG: Clear to Auscultation bilaterally; No rales, rhonchi, wheezing, or rubs  HEART: systolic murmur, Regular rate and rhythm; No rubs, or gallops  ABDOMEN: Soft, Nontender, Nondistended; Bowel sounds present  EXTREMITIES:  2+ Peripheral Pulses, No clubbing, cyanosis, or edema  SKIN: No rashes or lesions  NERVOUS SYSTEM:  Alert & Oriented X2, Good concentration; Motor Strength 5/5 B/L upper and lower extremities    LABS:                        14.3   6.52  )-----------( 250      ( 29 Aug 2019 06:11 )             45.1     08-29    140  |  104  |  19  ----------------------------<  106<H>  3.9   |  29  |  0.53    Ca    9.2      29 Aug 2019 06:11    TPro  7.2  /  Alb  2.9<L>  /  TBili  0.6  /  DBili  x   /  AST  16  /  ALT  18  /  AlkPhos  65  08-28    PT/INR - ( 28 Aug 2019 11:38 )   PT: 17.0 sec;   INR: 1.50 ratio         PTT - ( 28 Aug 2019 11:38 )  PTT:33.6 sec    CAPILLARY BLOOD GLUCOSE              RADIOLOGY & ADDITIONAL TESTS:          Imaging Personally Reviewed:  [ ] YES  [ ] NO    Consultant(s) Notes Reviewed:  [ x] YES  [ ] NO    Care Discussed with Consultants/Other Providers [x ] YES  [ ] NO
Patient is a 89y old  Female who presents with a chief complaint of unresponsiveness (30 Aug 2019 14:12)      INTERVAL HPI/ OVERNIGHT EVENTS: Pt was seen and examined at bedside today, No significant overnight events, pt denies any complaints.      MEDICATIONS  (STANDING):  acetaminophen   Tablet .. 650 milliGRAM(s) Oral every 6 hours  apixaban 2.5 milliGRAM(s) Oral every 12 hours  atorvastatin 40 milliGRAM(s) Oral at bedtime  docusate sodium 100 milliGRAM(s) Oral three times a day  losartan 25 milliGRAM(s) Oral daily  polyethylene glycol 3350 17 Gram(s) Oral two times a day  senna 2 Tablet(s) Oral at bedtime    MEDICATIONS  (PRN):      Allergies    No Known Allergies    Intolerances        REVIEW OF SYSTEMS:    Unable to examine due to [ ] Encephalopathy [ ] Advanced Dementia [ ] Expressive Aphasia [ ] Non-verbal patient    CONSTITUTIONAL: No fever, NO generalized weakness/Fatigue, No weight loss  EYES: No eye pain, visual disturbances, or discharge  ENMT:  No difficulty hearing, tinnitus, vertigo; No sinus or throat pain  NECK: No pain or stiffness  RESPIRATORY: No shortness of breath,  cough, wheezing, sputum or hemoptysis   CARDIOVASCULAR: No chest pain, palpitations, or leg swelling  GASTROINTESTINAL: No abdominal pain. No nausea, vomiting, diarrhea or constipation. No melena or hematochezia.  GENITOURINARY: No dysuria, frequency, hematuria, or incontinence  NEUROLOGICAL: No headaches, Dizziness, memory loss, loss of strength, numbness, or tremors  SKIN: No itching, burning, rashes, or lesions   MUSCULOSKELETAL: No joint pain or swelling; No muscle, back, or extremity pain  PSYCHIATRIC: No depression, anxiety, mood swings, or difficulty sleeping  HEME/LYMPH: No easy bruising, or bleeding gums      Vital Signs Last 24 Hrs  T(C): 36.1 (30 Aug 2019 11:12), Max: 37.2 (29 Aug 2019 17:00)  T(F): 97 (30 Aug 2019 11:12), Max: 99 (29 Aug 2019 17:00)  HR: 53 (30 Aug 2019 11:12) (52 - 66)  BP: 156/64 (30 Aug 2019 11:12) (131/58 - 156/64)  BP(mean): --  RR: 16 (30 Aug 2019 11:12) (16 - 16)  SpO2: 93% (30 Aug 2019 11:12) (93% - 95%)    PHYSICAL EXAM:  GENERAL: NAD, well-developed, well-groomed  HEAD:  Atraumatic, Normocephalic  EYES: conjunctiva and sclera clear  ENMT: Moist mucous membranes  NECK: Supple, No JVD, Normal thyroid  CHEST/LUNG: Clear to Auscultation bilaterally; No rales, rhonchi, wheezing, or rubs  HEART: systolic murmur, Regular rate and rhythm; No rubs, or gallops  ABDOMEN: Soft, Nontender, Nondistended; Bowel sounds present  EXTREMITIES:  2+ Peripheral Pulses, No clubbing, cyanosis, or edema  SKIN: No rashes or lesions  NERVOUS SYSTEM:  Alert & Oriented X2, Good concentration; Motor Strength 5/5 B/L upper and lower extremities      LABS:                        14.3   6.52  )-----------( 250      ( 29 Aug 2019 06:11 )             45.1     08-29    140  |  104  |  19  ----------------------------<  106<H>  3.9   |  29  |  0.53    Ca    9.2      29 Aug 2019 06:11          CAPILLARY BLOOD GLUCOSE              RADIOLOGY & ADDITIONAL TESTS:          Imaging Personally Reviewed:  [ ] YES  [ ] NO    Consultant(s) Notes Reviewed:  [x ] YES  [ ] NO    Care Discussed with Consultants/Other Providers [x ] YES  [ ] NO

## 2019-09-04 NOTE — DIETITIAN INITIAL EVALUATION ADULT. - OTHER INFO
Pt from [facility]; diet there [facility] no snack + DASH diet. Unable to interview pt due to cognitive impairment. Per medical record DM controlled at home via diet; pt once on Metformin but was d/c by PCP.  Pt receives help from supportive children; daughter-in-law does shopping & cooking. No reports of N/V/C/D or chew/swallowing difficulty. Wt gain as noted based on previous admission info.

## 2019-09-06 DIAGNOSIS — R00.1 BRADYCARDIA, UNSPECIFIED: ICD-10-CM

## 2019-09-06 DIAGNOSIS — G30.1 ALZHEIMER'S DISEASE WITH LATE ONSET: ICD-10-CM

## 2019-09-06 DIAGNOSIS — I48.91 UNSPECIFIED ATRIAL FIBRILLATION: ICD-10-CM

## 2019-09-06 DIAGNOSIS — E11.9 TYPE 2 DIABETES MELLITUS WITHOUT COMPLICATIONS: ICD-10-CM

## 2019-09-06 DIAGNOSIS — Z66 DO NOT RESUSCITATE: ICD-10-CM

## 2019-09-06 DIAGNOSIS — E78.5 HYPERLIPIDEMIA, UNSPECIFIED: ICD-10-CM

## 2019-09-06 DIAGNOSIS — R53.1 WEAKNESS: ICD-10-CM

## 2019-09-06 DIAGNOSIS — G45.9 TRANSIENT CEREBRAL ISCHEMIC ATTACK, UNSPECIFIED: ICD-10-CM

## 2019-09-06 DIAGNOSIS — F02.80 DEMENTIA IN OTHER DISEASES CLASSIFIED ELSEWHERE, UNSPECIFIED SEVERITY, WITHOUT BEHAVIORAL DISTURBANCE, PSYCHOTIC DISTURBANCE, MOOD DISTURBANCE, AND ANXIETY: ICD-10-CM

## 2019-09-06 DIAGNOSIS — Z79.1 LONG TERM (CURRENT) USE OF NON-STEROIDAL ANTI-INFLAMMATORIES (NSAID): ICD-10-CM

## 2019-09-06 DIAGNOSIS — I10 ESSENTIAL (PRIMARY) HYPERTENSION: ICD-10-CM

## 2019-09-06 DIAGNOSIS — E44.0 MODERATE PROTEIN-CALORIE MALNUTRITION: ICD-10-CM

## 2019-10-02 RX ORDER — APIXABAN 2.5 MG/1
1 TABLET, FILM COATED ORAL
Qty: 60 | Refills: 0
Start: 2019-10-02 | End: 2019-10-31

## 2020-09-29 NOTE — PATIENT PROFILE ADULT - NSTOBACCONEVERSMOKERY/N_GEN_A
No Topical Clindamycin Counseling: Patient counseled that this medication may cause skin irritation or allergic reactions.  In the event of skin irritation, the patient was advised to reduce the amount of the drug applied or use it less frequently.   The patient verbalized understanding of the proper use and possible adverse effects of clindamycin.  All of the patient's questions and concerns were addressed.

## 2021-05-23 NOTE — PATIENT PROFILE ADULT - NSPROMUTPARTICIPCAREFT_GEN_A_NUR
Physical Therapy  Patient not seen in therapy.     Unavailable due to medical tests/procedures.      On hold due to nursing request    Pt on hold d/t pending MRI per RN request.  Will f/u once results are known.        OBJECTIVE                     Therapy procedure time and total treatment time can be found documented on the Time Entry flowsheet   yes

## 2021-06-24 NOTE — DISCHARGE NOTE ADULT - THE PATIENT HAS
Benefits, risks, and possible complications of procedure explained to patient/caregiver who verbalized understanding and gave written consent.
This was an emergent procedure.
difficulty concentrating/difficulty remembering/difficulty decision making

## 2021-07-08 NOTE — PATIENT PROFILE ADULT - NSTOBACCO TYPE_GEN_A_CORE_RD
[Alert] : alert [Well Nourished] : well nourished [No Discharge] : no discharge [Normal TMs] : both tympanic membranes were normal [No Thrush] : no thrush [Boggy Nasal Turbinates] : no boggy and/or pale nasal turbinates [Posterior Pharyngeal Cobblestoning] : no posterior pharyngeal cobblestoning [Normal Rate and Effort] : normal respiratory rhythm and effort [Normal Cervical Lymph Nodes] : cervical [Skin Intact] : skin intact  Cigarettes

## 2021-11-04 NOTE — DISCHARGE NOTE PROVIDER - NSDCDCMDCOMP_GEN_ALL_CORE
[address] [facility] to find out what was being said since the directions are 3-4 times a day. They are stating the insurance will only cover it if she switches to Mail order pharmacy. They are going to call the patient to inform her. This document is complete and the patient is ready for discharge.

## 2022-08-09 NOTE — PATIENT PROFILE ADULT - NSPROEDAREADYLEARNOTH_GEN_A_NUR
86 y/o male w/ a PMHx of sepsis, neuropathy, prostate CA, Crohn's disease, hypothyroidism, depression, intervertebral disc disorder, DM, obesity, HTN, hypercholesterolemia, CAD w/ stents, macular degeneration, hearing loss and colon cancer s/p laparoscopic ileocolic resection on 6/29/22 with Dr. Ardon. Pt presents to the ED with increased drainage from the surgery site. No fevers, chills, changes in appetite. Imaging remarkable for large intraperitoneal thick wall abscess 17.5 x 11.8 x 10.8, IR consulted s/p IR drainage on 7/22, 40 cc pus drained, has drain in place, was given IV zosyn.    1. abdominal wall abscess d/t anastomotic leak/fistula s/p drainage/washout/ileostomy. ileus. s/p laparoscopic ileocolic resection. colon cancer. Crohn's disease. PCN allergy  - s/p IR drainage 7/22; body fluid cx growing c. diff/e faecalis - c diff growth in cx d/t anastomotic leak/fistula; also found to have Stenotrophomonas maltophilia  - s/p exlap, abdominal washout, diverting ileostomy 7/27  - s/p IR drainage of RUQ collection 8/4, cx no growth  - on zosyn day #18, on dc po augmentin x 10 days   - on IV flagyl [id number] for c diff coverage #14, complete 14 days   - on oral vancomycin [id number] #15, will dc   - on Bactrim SS po q 12 hours for the S. maltophilia, day #5, complete 2 more days   - continue with above antibiotic coverage  - c diff pcr (-)  - does not have true pcn allergy - tolerating abx without issues currently  - supportive care  - tolerating abx well so far; no side effects noted  
none

## 2022-12-15 NOTE — ED PROVIDER NOTE - NS ED MD DISPO ISOLATION TYPES
Cont warfarin, cont INR followup  Cont Metoprolol   Followup with Heme/onc  Cough syrup as needed  Get labs fasting in April with your other labs  F/u 4 months  Medicare Wellness Visit  Plan for Preventive Care    A good way for you to stay healthy is to use preventive care.  Medicare covers many services that can help you stay healthy.* The goal of these services is to find any health problems as quickly as possible. Finding problems early can help make them easier to treat.  Your personal plan below lists the services you may need and when they are due.      Health Maintenance Summary       Traditional Medicare- Medicare Wellness Visit (Yearly)  Due soon on 12/21/2022    Depression Screening (Yearly)  Due soon on 5/6/2023    DTaP/Tdap/Td Vaccine (2 - Td or Tdap)  Next due on 11/3/2032    Hepatitis B Vaccine (For Physician/APC Discussion)   Completed    Pneumococcal Vaccine 65+   Completed    Shingles Vaccine   Completed    Osteoporosis Screening   Completed    Influenza Vaccine   Completed    COVID-19 Vaccine   Completed    Meningococcal Vaccine   Aged Out    HPV Vaccine   Aged Out             Preventive Care for Women and Men    Heart Screenings (Cardiovascular):  Blood tests are used to check your cholesterol, lipid and triglyceride levels. High levels can increase your risk for heart disease and stroke. High levels can be treated with medications, diet and exercise. Lowering your levels can help keep your heart and blood vessels healthy.  Your provider will order these tests if they are needed.    An ultrasound is done to see if you have an abdominal aortic aneurysm (AAA).  This is an enlargement of one of the main blood vessels that delivers blood to the body.   In the [place], 9,000 deaths are caused by AAA.  You may not even know you have this problem and as many as 1 in 3 people will have a serious problem if it is not treated.  Early diagnosis allows for more effective treatment and cure.  If you  have a family history of AAA or are a male age 65-75 who has smoked, you are at higher risk of an AAA.  Your provider can order this test, if needed.    Colorectal Screening:  There are many tests that are used to check for cancer of your colon and rectum. You and your provider should discuss what test is best for you and when to have it done.  Options include:  Screening Colonoscopy: exam of the entire colon, seen through a flexible lighted tube.  Flexible Sigmoidoscopy: exam of the last third (sigmoid portion) of the colon and rectum, seen through a flexible lighted tube.  Cologuard DNA stool test: a sample of your stool is used to screen for cancer and unseen blood in your stool.  Fecal Occult Blood Test: a sample of your stool is studied to find any unseen blood    Flu Shot:  An immunization that helps to prevent influenza (the flu). You should get this every year. The best time to get the shot is in the fall.    Pneumococcal Shot:  Vaccines help prevent pneumococcal disease, which is any type of illness caused by Streptococcus pneumoniae bacteria. There are two kinds of pneumococcal vaccines available in the [place]:   Pneumococcal conjugate vaccines (PCV20 or [id number]®)  Pneumococcal polysaccharide vaccine (PPSV23 or [id number]®)  For those who have never received any pneumococcal conjugate vaccine, CDC recommends PVC20 for adults 65 years or older and adults 19 through 64 years old with certain medical conditions or risk factors.   For those who have previously received PCV13, this should be followed by a dose of PPSV23.     Hepatitis B Shot:  An immunization that helps to protect people from getting Hepatitis B. Hepatitis B is a virus that spreads through contact with infected blood or body fluids. Many people with the virus do not have symptoms.  The virus can lead to serious problems, such as liver disease. Some people are at higher risk than others. Your doctor will tell you if you need this  shot.     Diabetes Screening:  A test to measure sugar (glucose) in your blood is called a fasting blood sugar. Fasting means you cannot have food or drink for at least 8 hours before the test. This test can detect diabetes long before you may notice symptoms.    Glaucoma Screening:  Glaucoma screening is performed by your eye doctor. The test measures the fluid pressure inside your eyes to determine if you have glaucoma.     Hepatitis C Screening:  A blood test to see if you have the hepatitis C virus.  Hepatitis C attacks the liver and is a major cause of chronic liver disease.  Medicare will cover a single screening for all adults born between 1945 & 1965, or high risk patients (people who have injected illegal drugs or people who have had blood transfusions).  High risk patients who continue to inject illegal drugs can be screened for Hepatitis C every year.    Smoking and Tobacco-Use Cessation Counseling:  Tobacco is the single greatest cause of disease and early death in our country today. Medication and counseling together can increase a person’s chance of quitting for good.   Medicare covers two quitting attempts per year, with four counseling sessions per attempt (eight sessions in a 12 month period)    Preventive Screening tests for Women    Screening Mammograms and Breast Exams:  An x-ray of your breasts to check for breast cancer before you or your doctor may be able to feel it.  If breast cancer is found early it can usually be treated with success.    Pelvic Exams and Pap Tests:  An exam to check for cervical and vaginal cancer. A Pap test is a lab test in which cells are taken from your cervix and sent to the lab to look for signs of cervical cancer. If cancer of the cervix is found early, chances for a cure are good. Testing can generally end at age 65, or if a woman has a hysterectomy for a benign condition. Your provider may recommend more frequent testing if certain abnormal results are  found.    Bone Mass Measurements:  A painless x-ray of your bone density to see if you are at risk for a broken bone. Bone density refers to the thickness of bones or how tightly the bone tissue is packed.    Preventive Screening tests for Men    Prostate Screening:  Should you have a prostate cancer test (PSA)?  It is up to you to decide if you want a prostate cancer test. Talk to your clinician to find out if the test is right for you.  Things for you to consider and talk about should include:  Benefits and harms of the test  Your family history  How your race/ethnicity may influence the test  If the test may impact other medical conditions you have  Your values on screenings and treatments    *Medicare pays for many preventive services to keep you healthy. For some of these services, you might have to pay a deductible, coinsurance, and / or copayment.  The amounts vary depending on the type of services you need and the kind of Medicare health plan you have.    For further details on screenings offered by Medicare please visit: https://www.medicare.gov/coverage/preventive-screening-services    None

## 2023-03-06 NOTE — ED PROVIDER NOTE - CLINICAL SUMMARY MEDICAL DECISION MAKING FREE TEXT BOX
Addended by: YELITZA MELGAR on: 3/6/2023 04:14 PM     Modules accepted: Orders    
Ddx: TIA, NIH ss 0 at presentation/ no cp to suggest acs/ no sob to suggest PE  Plan: Cbc, cmp, lipase, CT head, neuro consult

## 2024-04-12 NOTE — PHYSICAL THERAPY INITIAL EVALUATION ADULT - GENERAL OBSERVATIONS, REHAB EVAL
Pt encountered semi-supine in bed + IV RUE (Heparin running), cardiac monitor, O2 via nasal cannula at 1L/min, continuous pulse ox, BP cuff LUE, primafit, dressing to L hip (clean, dry, & intact) 12-Apr-2024 19:35
